# Patient Record
Sex: FEMALE | Race: BLACK OR AFRICAN AMERICAN | NOT HISPANIC OR LATINO
[De-identification: names, ages, dates, MRNs, and addresses within clinical notes are randomized per-mention and may not be internally consistent; named-entity substitution may affect disease eponyms.]

---

## 2017-09-25 ENCOUNTER — OTHER (OUTPATIENT)
Age: 72
End: 2017-09-25

## 2017-10-06 ENCOUNTER — APPOINTMENT (OUTPATIENT)
Dept: ORTHOPEDIC SURGERY | Facility: CLINIC | Age: 72
End: 2017-10-06
Payer: MEDICARE

## 2017-10-06 VITALS — HEIGHT: 62 IN

## 2017-10-06 DIAGNOSIS — Z87.39 PERSONAL HISTORY OF OTHER DISEASES OF THE MUSCULOSKELETAL SYSTEM AND CONNECTIVE TISSUE: ICD-10-CM

## 2017-10-06 DIAGNOSIS — Z78.9 OTHER SPECIFIED HEALTH STATUS: ICD-10-CM

## 2017-10-06 DIAGNOSIS — M76.891 OTHER SPECIFIED ENTHESOPATHIES OF RIGHT LOWER LIMB, EXCLUDING FOOT: ICD-10-CM

## 2017-10-06 DIAGNOSIS — Z86.39 PERSONAL HISTORY OF OTHER ENDOCRINE, NUTRITIONAL AND METABOLIC DISEASE: ICD-10-CM

## 2017-10-06 DIAGNOSIS — Z82.61 FAMILY HISTORY OF ARTHRITIS: ICD-10-CM

## 2017-10-06 DIAGNOSIS — Z80.3 FAMILY HISTORY OF MALIGNANT NEOPLASM OF BREAST: ICD-10-CM

## 2017-10-06 PROCEDURE — 99213 OFFICE O/P EST LOW 20 MIN: CPT

## 2017-10-06 PROCEDURE — 73560 X-RAY EXAM OF KNEE 1 OR 2: CPT | Mod: LT

## 2017-10-06 PROCEDURE — 73562 X-RAY EXAM OF KNEE 3: CPT | Mod: RT

## 2017-10-09 ENCOUNTER — TRANSCRIPTION ENCOUNTER (OUTPATIENT)
Age: 72
End: 2017-10-09

## 2018-01-12 ENCOUNTER — APPOINTMENT (OUTPATIENT)
Dept: ORTHOPEDIC SURGERY | Facility: CLINIC | Age: 73
End: 2018-01-12
Payer: MEDICARE

## 2018-01-12 PROCEDURE — 99213 OFFICE O/P EST LOW 20 MIN: CPT

## 2018-01-12 PROCEDURE — 73562 X-RAY EXAM OF KNEE 3: CPT | Mod: 50

## 2018-01-31 ENCOUNTER — RESULT REVIEW (OUTPATIENT)
Age: 73
End: 2018-01-31

## 2018-07-27 ENCOUNTER — RECORD ABSTRACTING (OUTPATIENT)
Age: 73
End: 2018-07-27

## 2018-08-16 ENCOUNTER — APPOINTMENT (OUTPATIENT)
Dept: PULMONOLOGY | Facility: CLINIC | Age: 73
End: 2018-08-16
Payer: MEDICARE

## 2018-08-16 VITALS
BODY MASS INDEX: 50.61 KG/M2 | DIASTOLIC BLOOD PRESSURE: 73 MMHG | HEIGHT: 62 IN | SYSTOLIC BLOOD PRESSURE: 111 MMHG | RESPIRATION RATE: 16 BRPM | HEART RATE: 106 BPM | OXYGEN SATURATION: 92 % | WEIGHT: 275 LBS

## 2018-08-16 PROCEDURE — 99213 OFFICE O/P EST LOW 20 MIN: CPT

## 2018-08-16 RX ORDER — ATORVASTATIN CALCIUM 20 MG/1
20 TABLET, FILM COATED ORAL
Qty: 90 | Refills: 0 | Status: DISCONTINUED | COMMUNITY
Start: 2017-07-12 | End: 2018-08-16

## 2018-08-16 RX ORDER — NAPROXEN 500 MG/1
500 TABLET ORAL
Qty: 60 | Refills: 1 | Status: DISCONTINUED | COMMUNITY
Start: 2017-10-06 | End: 2018-08-16

## 2018-08-16 RX ORDER — METHYLPREDNISOLONE 4 MG/1
4 TABLET ORAL
Qty: 21 | Refills: 0 | Status: DISCONTINUED | COMMUNITY
Start: 2017-10-27 | End: 2018-08-16

## 2018-08-16 RX ORDER — PRAVASTATIN SODIUM 10 MG/1
10 TABLET ORAL
Qty: 30 | Refills: 0 | Status: DISCONTINUED | COMMUNITY
Start: 2017-12-19 | End: 2018-08-16

## 2018-08-16 RX ORDER — AZITHROMYCIN 250 MG/1
250 TABLET, FILM COATED ORAL
Qty: 6 | Refills: 0 | Status: DISCONTINUED | COMMUNITY
Start: 2017-10-27 | End: 2018-08-16

## 2018-08-16 RX ORDER — LEVOFLOXACIN 500 MG/1
500 TABLET, FILM COATED ORAL
Qty: 10 | Refills: 0 | Status: DISCONTINUED | COMMUNITY
Start: 2017-08-25 | End: 2018-08-16

## 2018-08-16 RX ORDER — DILTIAZEM HYDROCHLORIDE 180 MG/1
180 TABLET, EXTENDED RELEASE ORAL
Refills: 0 | Status: DISCONTINUED | COMMUNITY
End: 2018-08-16

## 2018-08-16 RX ORDER — ROSUVASTATIN CALCIUM 5 MG/1
5 TABLET, FILM COATED ORAL
Qty: 90 | Refills: 0 | Status: DISCONTINUED | COMMUNITY
Start: 2017-12-18 | End: 2018-08-16

## 2019-02-22 ENCOUNTER — APPOINTMENT (OUTPATIENT)
Dept: ORTHOPEDIC SURGERY | Facility: CLINIC | Age: 74
End: 2019-02-22
Payer: MEDICARE

## 2019-02-22 VITALS — WEIGHT: 270 LBS | BODY MASS INDEX: 50.98 KG/M2 | HEIGHT: 61 IN

## 2019-02-22 DIAGNOSIS — M21.062 VALGUS DEFORMITY, NOT ELSEWHERE CLASSIFIED, LEFT KNEE: ICD-10-CM

## 2019-02-22 PROCEDURE — 73562 X-RAY EXAM OF KNEE 3: CPT | Mod: RT

## 2019-02-22 PROCEDURE — 99214 OFFICE O/P EST MOD 30 MIN: CPT

## 2019-02-22 PROCEDURE — 73564 X-RAY EXAM KNEE 4 OR MORE: CPT | Mod: LT

## 2019-02-22 NOTE — DISCUSSION/SUMMARY
[de-identified] : Discussed at length the nature of the patients condition. Her right TKR is doing well at 5 years.  Their left knee symptoms appear secondary to severe degenerative arthritis with valgus deformity and loose body in suprapatellar pouch. We discussed at length the natural history of LEFT knee degenerative arthritis and reviewed non-operative and operative treatment. Due to the pain, failure of prior nonoperative treatment including injections, NSAIDs, and physiotherapy, and associated disability I recommend a LEFT total knee replacement. The risks, benefits, convalescence and alternatives were reviewed. Numerous questions were asked and answered. We did discuss correction of the valgus deformity and possible peroneal nerve palsy with numbness and foot and ankle weakness, which tends to be temporary, if develops may need ankle brace for walking. Models were used as an educational tool. Surgery will be scheduled at a convenient time. Preop medical and cardiac clearance. \par  \par

## 2019-02-22 NOTE — ADDENDUM
[FreeTextEntry1] : This note was written by Navya Jerry on 02/22/2019 acting as scribe for Dr. Joselo Ramírez M.D.\par \par I, Dr. Joselo Ramírez M.D., have read and attest that all the information, medical decision making and discharge instructions within are true and accurate.\par

## 2019-02-22 NOTE — PHYSICAL EXAM
[Antalgic] : antalgic [de-identified] : Left Knee\par Inspection: no effusion or erythema.\par Wounds: 15 degrees valgus alignment \par Alignment: normal.\par Palpation: medial and lateral tenderness on palpation.\par ROM: Active (in degrees) 0-100 with pain and crepitus \par Ligamentous laxity (neg): all ligaments appear stable, negative ant. drawer test, negative post. drawer test, stable to varus stress test, stable to valgus stress test, negative Lachman's test, negative pivot shift test,\par Meniscal Test: negative McMurrays, negative Reuben.\par Patellofemoral Alignment Test: Q angle-, normal.\par Muscle Test: good quad strength.\par Leg examination: calf is soft and non-tender.\par \par Right knee\par Inspection: no effusion or erythema.\par Wounds: none.\par Alignment: normal.\par Palpation: no specific tenderness on palpation.\par ROM: Active (in degrees) 0-130\par Ligamentous laxity (neg): all ligaments appear stable, negative ant. drawer test, negative post. drawer test, stable to varus stress test, stable to valgus stress test, negative Lachman's test, negative pivot shift test,\par Meniscal Test: negative McMurrays, negative Reuben.\par Patellofemoral Alignment Test: Q angle-, normal.\par Muscle Test: good quad strength.\par Leg examination: calf is soft and non-tender.\par   [de-identified] : Right  knee xrays, taken at the office today show:,  AP, lateral, merchant view demonstrates a total knee replacement in satisfactory position and alignment. No evidence of loosening. The patella sits in a central position \par \par Left  knee xrays, standing AP/Lateral, Merchant films, and 45 degree PA standing view taken at the office today show:, diffuse tricompartmental degenerative arthritis, diffuse joint space narrowing, especially lateral compartment, marginal osteophytes, sclerosis, patellofemoral joint space narrowing, peripheral osteophytes, bone on bone, Kellgren and Tay grade 4, valgus deformity, large loose body in suprapatellar pouch \par

## 2019-02-22 NOTE — HISTORY OF PRESENT ILLNESS
[de-identified] : 72 y/o female presents for follow up evaluation 7 years s/p right TKR and for her left arthritic knee. She states her right knee is doing well, but she finds her left knee to interfere with her quality of life and bother her on a daily basis. She has not received any injections in her left knee, and takes Excedrin and glucosamine chondroitin with some improvement. She tries to stay active, but is held back by her left knee pain. Today, she would like to discuss her treatment options with Dr. Ramírez.

## 2019-02-27 ENCOUNTER — RESULT REVIEW (OUTPATIENT)
Age: 74
End: 2019-02-27

## 2019-04-03 ENCOUNTER — APPOINTMENT (OUTPATIENT)
Dept: PULMONOLOGY | Facility: CLINIC | Age: 74
End: 2019-04-03
Payer: MEDICARE

## 2019-04-03 VITALS
TEMPERATURE: 98.2 F | HEART RATE: 98 BPM | DIASTOLIC BLOOD PRESSURE: 75 MMHG | SYSTOLIC BLOOD PRESSURE: 123 MMHG | RESPIRATION RATE: 16 BRPM | OXYGEN SATURATION: 98 %

## 2019-04-03 PROCEDURE — 99213 OFFICE O/P EST LOW 20 MIN: CPT

## 2019-04-03 NOTE — HISTORY OF PRESENT ILLNESS
[FreeTextEntry1] : pt with mor compliant on cpap here for pulm clearance prior to L TKR 4/18/19\par no respiratory complaints\par using cpap without issue\par ESS 5\par AHI 3.5 per compliance report today.

## 2019-04-03 NOTE — REASON FOR VISIT
[Follow-Up] : a follow-up visit [FreeTextEntry2] : pulmonary clearance prior to knee surgery L TKR 4/18/19

## 2019-04-03 NOTE — PHYSICAL EXAM
[Well Groomed] : well groomed [General Appearance - In No Acute Distress] : no acute distress [] : no respiratory distress [Auscultation Breath Sounds / Voice Sounds] : lungs were clear to auscultation bilaterally

## 2019-04-03 NOTE — ASSESSMENT
[FreeTextEntry1] : cont to use CPAP thearpy, compliant and benefitting\par no pulmonary contraindication to proposed L TKR\par Standard mor precautions should be taken, pt instructed to bring her CPAP to the hospital for perioperative use.

## 2019-04-05 ENCOUNTER — OUTPATIENT (OUTPATIENT)
Dept: OUTPATIENT SERVICES | Facility: HOSPITAL | Age: 74
LOS: 1 days | Discharge: HOME | End: 2019-04-05

## 2019-04-05 VITALS
HEART RATE: 91 BPM | DIASTOLIC BLOOD PRESSURE: 85 MMHG | SYSTOLIC BLOOD PRESSURE: 137 MMHG | HEIGHT: 63 IN | OXYGEN SATURATION: 99 % | WEIGHT: 266.32 LBS | RESPIRATION RATE: 16 BRPM | TEMPERATURE: 98 F

## 2019-04-05 DIAGNOSIS — Z90.721 ACQUIRED ABSENCE OF OVARIES, UNILATERAL: Chronic | ICD-10-CM

## 2019-04-05 DIAGNOSIS — M17.12 UNILATERAL PRIMARY OSTEOARTHRITIS, LEFT KNEE: ICD-10-CM

## 2019-04-05 DIAGNOSIS — Z98.891 HISTORY OF UTERINE SCAR FROM PREVIOUS SURGERY: Chronic | ICD-10-CM

## 2019-04-05 DIAGNOSIS — E78.5 HYPERLIPIDEMIA, UNSPECIFIED: ICD-10-CM

## 2019-04-05 DIAGNOSIS — Z98.890 OTHER SPECIFIED POSTPROCEDURAL STATES: Chronic | ICD-10-CM

## 2019-04-05 DIAGNOSIS — M19.90 UNSPECIFIED OSTEOARTHRITIS, UNSPECIFIED SITE: ICD-10-CM

## 2019-04-05 DIAGNOSIS — M21.062 VALGUS DEFORMITY, NOT ELSEWHERE CLASSIFIED, LEFT KNEE: ICD-10-CM

## 2019-04-05 DIAGNOSIS — E11.9 TYPE 2 DIABETES MELLITUS WITHOUT COMPLICATIONS: ICD-10-CM

## 2019-04-05 DIAGNOSIS — Z01.818 ENCOUNTER FOR OTHER PREPROCEDURAL EXAMINATION: ICD-10-CM

## 2019-04-05 DIAGNOSIS — Z96.659 PRESENCE OF UNSPECIFIED ARTIFICIAL KNEE JOINT: Chronic | ICD-10-CM

## 2019-04-05 LAB
APTT BLD: 33.2 SEC — SIGNIFICANT CHANGE UP (ref 27.5–36.3)
BASOPHILS # BLD AUTO: 0.05 K/UL — SIGNIFICANT CHANGE UP (ref 0–0.2)
BASOPHILS NFR BLD AUTO: 0.8 % — SIGNIFICANT CHANGE UP (ref 0–2)
EOSINOPHIL # BLD AUTO: 0.17 K/UL — SIGNIFICANT CHANGE UP (ref 0–0.5)
EOSINOPHIL NFR BLD AUTO: 2.6 % — SIGNIFICANT CHANGE UP (ref 0–6)
HCT VFR BLD CALC: 39.8 % — SIGNIFICANT CHANGE UP (ref 34.5–45)
HGB BLD-MCNC: 11.8 G/DL — SIGNIFICANT CHANGE UP (ref 11.5–15.5)
IMM GRANULOCYTES NFR BLD AUTO: 1.1 % — SIGNIFICANT CHANGE UP (ref 0–1.5)
INR BLD: 1.02 RATIO — SIGNIFICANT CHANGE UP (ref 0.88–1.16)
LYMPHOCYTES # BLD AUTO: 1.68 K/UL — SIGNIFICANT CHANGE UP (ref 1–3.3)
LYMPHOCYTES # BLD AUTO: 25.5 % — SIGNIFICANT CHANGE UP (ref 13–44)
MCHC RBC-ENTMCNC: 24.2 PG — LOW (ref 27–34)
MCHC RBC-ENTMCNC: 29.6 GM/DL — LOW (ref 32–36)
MCV RBC AUTO: 81.7 FL — SIGNIFICANT CHANGE UP (ref 80–100)
MONOCYTES # BLD AUTO: 0.53 K/UL — SIGNIFICANT CHANGE UP (ref 0–0.9)
MONOCYTES NFR BLD AUTO: 8 % — SIGNIFICANT CHANGE UP (ref 2–14)
NEUTROPHILS # BLD AUTO: 4.1 K/UL — SIGNIFICANT CHANGE UP (ref 1.8–7.4)
NEUTROPHILS NFR BLD AUTO: 62 % — SIGNIFICANT CHANGE UP (ref 43–77)
NRBC # BLD: 0 /100 WBCS — SIGNIFICANT CHANGE UP (ref 0–0)
PLATELET # BLD AUTO: 305 K/UL — SIGNIFICANT CHANGE UP (ref 150–400)
PROTHROM AB SERPL-ACNC: 11.4 SEC — SIGNIFICANT CHANGE UP (ref 10–12.9)
RBC # BLD: 4.87 M/UL — SIGNIFICANT CHANGE UP (ref 3.8–5.2)
RBC # FLD: 18.6 % — HIGH (ref 10.3–14.5)
WBC # BLD: 6.6 K/UL — SIGNIFICANT CHANGE UP (ref 3.8–10.5)
WBC # FLD AUTO: 6.6 K/UL — SIGNIFICANT CHANGE UP (ref 3.8–10.5)

## 2019-04-05 NOTE — H&P PST ADULT - HISTORY OF PRESENT ILLNESS
74 yo female c/o left knee pain secondary to osteoarthritis - scheduled for left knee arthroplasty 72 yo female c/o left knee pain secondary to osteoarthritis - scheduled for left knee arthroplasty. PMH- htn, dm, hld,gerd

## 2019-04-05 NOTE — PHYSICAL THERAPY INITIAL EVALUATION ADULT - RANGE OF MOTION EXAMINATION, REHAB EVAL
except L knee limited due to pain./bilateral lower extremity was ROM was WNL (within normal limits)/deficits as listed below/bilateral upper extremity ROM was WNL (within normal limits)

## 2019-04-05 NOTE — OCCUPATIONAL THERAPY INITIAL EVALUATION ADULT - RANGE OF MOTION EXAMINATION, LOWER EXTREMITY
bilateral LE Active ROM was WFL  (within functional limits)/bilateral LE Passive ROM was WFL  (within functional limits)/ROM in flexion/extension is limited in both knees

## 2019-04-05 NOTE — H&P PST ADULT - NSICDXPASTSURGICALHX_GEN_ALL_CORE_FT
PAST SURGICAL HISTORY:  History of endometrial ablation 2002    History of unilateral oophorectomy     S/P  section     S/P knee replacement Right (  )

## 2019-04-05 NOTE — PHYSICAL THERAPY INITIAL EVALUATION ADULT - ACTIVE RANGE OF MOTION EXAMINATION, REHAB EVAL
except L knee limited due to pain./allie. upper extremity Active ROM was WNL (within normal limits)/bilateral lower extremity Active ROM was WNL (within normal limits)/deficits as listed below

## 2019-04-05 NOTE — OCCUPATIONAL THERAPY INITIAL EVALUATION ADULT - COORDINATION ASSESSED, REHAB EVAL
intact in BUE; diminished in BLE/finger to nose/heel to shin intact in BUE; diminished in BLE/heel to shin/finger to nose

## 2019-04-05 NOTE — PHYSICAL THERAPY INITIAL EVALUATION ADULT - PERTINENT HX OF CURRENT PROBLEM, REHAB EVAL
Patient attends pre-op testing today following consult c Dr. Ramírez due to chronic pain to L knee. Elective L TKA is now scheduled in this facility for 4/18/2019.

## 2019-04-05 NOTE — PHYSICAL THERAPY INITIAL EVALUATION ADULT - GAIT DEVIATIONS NOTED, PT EVAL
increased time in double stance/decreased weight-shifting ability/decreased chery/decreased step length/decreased stride length/increased stride width

## 2019-04-05 NOTE — OCCUPATIONAL THERAPY INITIAL EVALUATION ADULT - LIVES WITH, PROFILE
spouse/in a private house with 4 entry , equipped with wide hand rails. Once inside, pt has to negotiate a flight of stairs to access the bedroom and bathroom. The bathroom has a stall shower, grab bars , fixed shower head and standard toilet grab ars

## 2019-04-05 NOTE — OCCUPATIONAL THERAPY INITIAL EVALUATION ADULT - ADDITIONAL COMMENTS
Pt is functioning in her roles, self sufficient, driving & ambulating independently in the community without  a single axis cane. Pt owns no other DME.  Pt is right hand dominant and wears glasses for reading. Pt c/o  daily  pain in her left knee with intensity 9/10 . This impacts ADL management, standing tolerance and ambulation ;pt takes Tylenol and Excedrin PRN for pain relief. Pt scores 80% of patient specific scale.

## 2019-04-05 NOTE — OCCUPATIONAL THERAPY INITIAL EVALUATION ADULT - RANGE OF MOTION EXAMINATION, UPPER EXTREMITY
Right UE Active ROM was WFL (within functional limits)/ROM in left shoulder 0-85 degrees/Right UE Passive ROM was WFL  (within functional limits)

## 2019-04-05 NOTE — PHYSICAL THERAPY INITIAL EVALUATION ADULT - ADDITIONAL COMMENTS
Pt lives with her  (family can come over and provide assist upon D/C home) in a private home, 4 entry steps (B/L rails, far apart to reach both at the same time), 1 flight of stairs c B/L rails (reachable). Pt states she is currently independent with all functional mobility including community ambulation with straight cane. Pt states she is independent with ADL's as well. Pt is right hand dominant, wears eye glasses, drives, & is retired. Pt has a  walk-in shower stall with a fixed shower head, comfort toilet seat height c removable frame rails, & no grab bar. Pt reports daily 8/10 pain & states it is worse with any activity. Pt endorses taking narcotics for pain management. Goal of therapy: manage pain & improve functional mobility.

## 2019-04-05 NOTE — H&P PST ADULT - NSICDXPASTMEDICALHX_GEN_ALL_CORE_FT
PAST MEDICAL HISTORY:  Chronic GERD     Diabetes mellitus     HLD (hyperlipidemia)     HTN (hypertension)     Sleep apnea

## 2019-04-05 NOTE — H&P PST ADULT - NSICDXPROBLEM_GEN_ALL_CORE_FT
PROBLEM DIAGNOSES  Problem: Chronic osteoarthritis  Assessment and Plan: scheduled for left knee arthroplasty    Problem: Diabetes mellitus  Assessment and Plan: continue meds      R/O PROBLEM DIAGNOSES  Problem: HTN (hypertension)  Assessment and Plan: continue meds

## 2019-04-05 NOTE — OCCUPATIONAL THERAPY INITIAL EVALUATION ADULT - PERTINENT HX OF CURRENT PROBLEM, REHAB EVAL
Pt is slated for elective surgery for left TKR at a later date with MD Ramírez due to OA, chronic pain and DJD. P t reported no fall in the past 6 months

## 2019-04-05 NOTE — H&P PST ADULT - ASSESSMENT
osteoarthritis left knee  CAPRINI SCORE    AGE RELATED RISK FACTORS                                                       MOBILITY RELATED FACTORS  [ ] Age 41-60 years                                            (1 Point)                  [ ] Bed rest                                                        (1 Point)  [ ] Age: 61-74 years                                           (2 Points)                [ ] Plaster cast                                                   (2 Points)  [ x] Age= 75 years                                              (3 Points)                 [ ] Bed bound for more than 72 hours                   (2 Points)    DISEASE RELATED RISK FACTORS                                               GENDER SPECIFIC FACTORS  [x ] Edema in the lower extremities                       (1 Point)                  [ ] Pregnancy                                                     (1 Point)  [ ] Varicose veins                                               (1 Point)                  [ ] Post-partum < 6 weeks                                   (1 Point)             [ x] BMI > 25 Kg/m2                                            (1 Point)                  [ ] Hormonal therapy  or oral contraception            (1 Point)                 [ ] Sepsis (in the previous month)                        (1 Point)                  [ ] History of pregnancy complications  [ ] Pneumonia or serious lung disease                                               [ ] Unexplained or recurrent                       (1 Point)           (in the previous month)                               (1 Point)  [ ] Abnormal pulmonary function test                     (1 Point)                 SURGERY RELATED RISK FACTORS  [ ] Acute myocardial infarction                              (1 Point)                 [ ]  Section                                            (1 Point)  [ ] Congestive heart failure (in the previous month)  (1 Point)                 [ ] Minor surgery                                                 (1 Point)   [ ] Inflammatory bowel disease                             (1 Point)                 [ ] Arthroscopic surgery                                        (2 Points)  [ ] Central venous access                                    (2 Points)                [ ] General surgery lasting more than 45 minutes   (2 Points)       [ ] Stroke (in the previous month)                          (5 Points)               [x ] Elective arthroplasty                                        (5 Points)                                                                                                                                               HEMATOLOGY RELATED FACTORS                                                 TRAUMA RELATED RISK FACTORS  [ ] Prior episodes of VTE                                     (3 Points)                 [ ] Fracture of the hip, pelvis, or leg                       (5 Points)  [ ] Positive family history for VTE                         (3 Points)                 [ ] Acute spinal cord injury (in the previous month)  (5 Points)  [ ] Prothrombin 41964 A                                      (3 Points)                 [ ] Paralysis  (less than 1 month)                          (5 Points)  [ ] Factor V Leiden                                             (3 Points)                 [ ] Multiple Trauma within 1 month                         (5 Points)  [ ] Lupus anticoagulants                                     (3 Points)                                                           [ ] Anticardiolipin antibodies                                (3 Points)                                                       [ ] High homocysteine in the blood                      (3 Points)                                             [ ] Other congenital or acquired thrombophilia       (3 Points)                                                [ ] Heparin induced thrombocytopenia                  (3 Points)                                          Total Score [      10    ]

## 2019-04-06 LAB
MRSA PCR RESULT.: SIGNIFICANT CHANGE UP
S AUREUS DNA NOSE QL NAA+PROBE: SIGNIFICANT CHANGE UP

## 2019-04-18 ENCOUNTER — INPATIENT (INPATIENT)
Facility: HOSPITAL | Age: 74
LOS: 1 days | Discharge: HOME | End: 2019-04-20
Attending: ORTHOPAEDIC SURGERY | Admitting: ORTHOPAEDIC SURGERY
Payer: MEDICARE

## 2019-04-18 ENCOUNTER — RESULT REVIEW (OUTPATIENT)
Age: 74
End: 2019-04-18

## 2019-04-18 ENCOUNTER — APPOINTMENT (OUTPATIENT)
Dept: ORTHOPEDIC SURGERY | Facility: HOSPITAL | Age: 74
End: 2019-04-18

## 2019-04-18 ENCOUNTER — CHART COPY (OUTPATIENT)
Age: 74
End: 2019-04-18

## 2019-04-18 ENCOUNTER — TRANSCRIPTION ENCOUNTER (OUTPATIENT)
Age: 74
End: 2019-04-18

## 2019-04-18 VITALS
TEMPERATURE: 97 F | DIASTOLIC BLOOD PRESSURE: 78 MMHG | SYSTOLIC BLOOD PRESSURE: 142 MMHG | HEIGHT: 60.98 IN | HEART RATE: 76 BPM | OXYGEN SATURATION: 98 % | RESPIRATION RATE: 16 BRPM

## 2019-04-18 DIAGNOSIS — M17.12 UNILATERAL PRIMARY OSTEOARTHRITIS, LEFT KNEE: ICD-10-CM

## 2019-04-18 DIAGNOSIS — Z98.891 HISTORY OF UTERINE SCAR FROM PREVIOUS SURGERY: Chronic | ICD-10-CM

## 2019-04-18 DIAGNOSIS — Z96.659 PRESENCE OF UNSPECIFIED ARTIFICIAL KNEE JOINT: Chronic | ICD-10-CM

## 2019-04-18 DIAGNOSIS — Z90.721 ACQUIRED ABSENCE OF OVARIES, UNILATERAL: Chronic | ICD-10-CM

## 2019-04-18 DIAGNOSIS — Z98.890 OTHER SPECIFIED POSTPROCEDURAL STATES: Chronic | ICD-10-CM

## 2019-04-18 LAB
ANION GAP SERPL CALC-SCNC: 8 MMOL/L — SIGNIFICANT CHANGE UP (ref 5–17)
BUN SERPL-MCNC: 37 MG/DL — HIGH (ref 7–23)
CALCIUM SERPL-MCNC: 9.5 MG/DL — SIGNIFICANT CHANGE UP (ref 8.5–10.1)
CHLORIDE SERPL-SCNC: 110 MMOL/L — HIGH (ref 96–108)
CO2 SERPL-SCNC: 21 MMOL/L — LOW (ref 22–31)
CREAT SERPL-MCNC: 1.56 MG/DL — HIGH (ref 0.5–1.3)
GLUCOSE BLDC GLUCOMTR-MCNC: 108 MG/DL — HIGH (ref 70–99)
GLUCOSE BLDC GLUCOMTR-MCNC: 129 MG/DL — HIGH (ref 70–99)
GLUCOSE BLDC GLUCOMTR-MCNC: 130 MG/DL — HIGH (ref 70–99)
GLUCOSE SERPL-MCNC: 143 MG/DL — HIGH (ref 70–99)
HCT VFR BLD CALC: 38.8 % — SIGNIFICANT CHANGE UP (ref 34.5–45)
HGB BLD-MCNC: 11.5 G/DL — SIGNIFICANT CHANGE UP (ref 11.5–15.5)
INR BLD: 1.11 RATIO — SIGNIFICANT CHANGE UP (ref 0.88–1.16)
MCHC RBC-ENTMCNC: 25.4 PG — LOW (ref 27–34)
MCHC RBC-ENTMCNC: 29.6 GM/DL — LOW (ref 32–36)
MCV RBC AUTO: 85.8 FL — SIGNIFICANT CHANGE UP (ref 80–100)
NRBC # BLD: 0 /100 WBCS — SIGNIFICANT CHANGE UP (ref 0–0)
PLATELET # BLD AUTO: 303 K/UL — SIGNIFICANT CHANGE UP (ref 150–400)
POTASSIUM SERPL-MCNC: 4.1 MMOL/L — SIGNIFICANT CHANGE UP (ref 3.5–5.3)
POTASSIUM SERPL-SCNC: 4.1 MMOL/L — SIGNIFICANT CHANGE UP (ref 3.5–5.3)
PROTHROM AB SERPL-ACNC: 12.5 SEC — SIGNIFICANT CHANGE UP (ref 10–12.9)
RBC # BLD: 4.52 M/UL — SIGNIFICANT CHANGE UP (ref 3.8–5.2)
RBC # FLD: 21.1 % — HIGH (ref 10.3–14.5)
SODIUM SERPL-SCNC: 139 MMOL/L — SIGNIFICANT CHANGE UP (ref 135–145)
WBC # BLD: 6.4 K/UL — SIGNIFICANT CHANGE UP (ref 3.8–10.5)
WBC # FLD AUTO: 6.4 K/UL — SIGNIFICANT CHANGE UP (ref 3.8–10.5)

## 2019-04-18 PROCEDURE — 88311 DECALCIFY TISSUE: CPT | Mod: 26

## 2019-04-18 PROCEDURE — 88305 TISSUE EXAM BY PATHOLOGIST: CPT | Mod: 26

## 2019-04-18 PROCEDURE — 27447 TOTAL KNEE ARTHROPLASTY: CPT | Mod: LT

## 2019-04-18 RX ORDER — SODIUM CHLORIDE 9 MG/ML
1000 INJECTION, SOLUTION INTRAVENOUS
Qty: 0 | Refills: 0 | Status: DISCONTINUED | OUTPATIENT
Start: 2019-04-18 | End: 2019-04-18

## 2019-04-18 RX ORDER — METOCLOPRAMIDE HCL 10 MG
10 TABLET ORAL ONCE
Qty: 0 | Refills: 0 | Status: DISCONTINUED | OUTPATIENT
Start: 2019-04-18 | End: 2019-04-18

## 2019-04-18 RX ORDER — ONDANSETRON 8 MG/1
4 TABLET, FILM COATED ORAL EVERY 6 HOURS
Qty: 0 | Refills: 0 | Status: DISCONTINUED | OUTPATIENT
Start: 2019-04-18 | End: 2019-04-20

## 2019-04-18 RX ORDER — MAGNESIUM HYDROXIDE 400 MG/1
30 TABLET, CHEWABLE ORAL DAILY
Qty: 0 | Refills: 0 | Status: DISCONTINUED | OUTPATIENT
Start: 2019-04-18 | End: 2019-04-20

## 2019-04-18 RX ORDER — CELECOXIB 200 MG/1
200 CAPSULE ORAL ONCE
Qty: 0 | Refills: 0 | Status: DISCONTINUED | OUTPATIENT
Start: 2019-04-18 | End: 2019-04-18

## 2019-04-18 RX ORDER — VANCOMYCIN HCL 1 G
1500 VIAL (EA) INTRAVENOUS EVERY 12 HOURS
Qty: 0 | Refills: 0 | Status: COMPLETED | OUTPATIENT
Start: 2019-04-18 | End: 2019-04-18

## 2019-04-18 RX ORDER — ACETAMINOPHEN 500 MG
975 TABLET ORAL EVERY 8 HOURS
Qty: 0 | Refills: 0 | Status: COMPLETED | OUTPATIENT
Start: 2019-04-18 | End: 2019-04-20

## 2019-04-18 RX ORDER — DEXTROSE 50 % IN WATER 50 %
25 SYRINGE (ML) INTRAVENOUS ONCE
Qty: 0 | Refills: 0 | Status: DISCONTINUED | OUTPATIENT
Start: 2019-04-18 | End: 2019-04-20

## 2019-04-18 RX ORDER — HYDROMORPHONE HYDROCHLORIDE 2 MG/ML
1 INJECTION INTRAMUSCULAR; INTRAVENOUS; SUBCUTANEOUS
Qty: 0 | Refills: 0 | Status: DISCONTINUED | OUTPATIENT
Start: 2019-04-18 | End: 2019-04-18

## 2019-04-18 RX ORDER — DOCUSATE SODIUM 100 MG
100 CAPSULE ORAL THREE TIMES A DAY
Qty: 0 | Refills: 0 | Status: DISCONTINUED | OUTPATIENT
Start: 2019-04-18 | End: 2019-04-20

## 2019-04-18 RX ORDER — SODIUM CHLORIDE 9 MG/ML
1000 INJECTION, SOLUTION INTRAVENOUS
Qty: 0 | Refills: 0 | Status: DISCONTINUED | OUTPATIENT
Start: 2019-04-18 | End: 2019-04-20

## 2019-04-18 RX ORDER — LOSARTAN POTASSIUM 100 MG/1
100 TABLET, FILM COATED ORAL DAILY
Qty: 0 | Refills: 0 | Status: DISCONTINUED | OUTPATIENT
Start: 2019-04-18 | End: 2019-04-20

## 2019-04-18 RX ORDER — ENOXAPARIN SODIUM 100 MG/ML
40 INJECTION SUBCUTANEOUS EVERY 24 HOURS
Qty: 0 | Refills: 0 | Status: DISCONTINUED | OUTPATIENT
Start: 2019-04-19 | End: 2019-04-20

## 2019-04-18 RX ORDER — DEXTROSE 50 % IN WATER 50 %
12.5 SYRINGE (ML) INTRAVENOUS ONCE
Qty: 0 | Refills: 0 | Status: DISCONTINUED | OUTPATIENT
Start: 2019-04-18 | End: 2019-04-20

## 2019-04-18 RX ORDER — KETOROLAC TROMETHAMINE 30 MG/ML
30 SYRINGE (ML) INJECTION ONCE
Qty: 0 | Refills: 0 | Status: DISCONTINUED | OUTPATIENT
Start: 2019-04-19 | End: 2019-04-19

## 2019-04-18 RX ORDER — BENZOCAINE AND MENTHOL 5; 1 G/100ML; G/100ML
1 LIQUID ORAL EVERY 6 HOURS
Qty: 0 | Refills: 0 | Status: DISCONTINUED | OUTPATIENT
Start: 2019-04-18 | End: 2019-04-20

## 2019-04-18 RX ORDER — ACETAMINOPHEN 500 MG
650 TABLET ORAL ONCE
Qty: 0 | Refills: 0 | Status: DISCONTINUED | OUTPATIENT
Start: 2019-04-18 | End: 2019-04-18

## 2019-04-18 RX ORDER — HYDROMORPHONE HYDROCHLORIDE 2 MG/ML
0.5 INJECTION INTRAMUSCULAR; INTRAVENOUS; SUBCUTANEOUS
Qty: 0 | Refills: 0 | Status: DISCONTINUED | OUTPATIENT
Start: 2019-04-18 | End: 2019-04-18

## 2019-04-18 RX ORDER — TRAMADOL HYDROCHLORIDE 50 MG/1
100 TABLET ORAL EVERY 4 HOURS
Qty: 0 | Refills: 0 | Status: DISCONTINUED | OUTPATIENT
Start: 2019-04-18 | End: 2019-04-20

## 2019-04-18 RX ORDER — ENOXAPARIN SODIUM 100 MG/ML
40 INJECTION SUBCUTANEOUS
Qty: 5.2 | Refills: 0
Start: 2019-04-18 | End: 2019-04-30

## 2019-04-18 RX ORDER — DILTIAZEM HCL 120 MG
180 CAPSULE, EXT RELEASE 24 HR ORAL DAILY
Qty: 0 | Refills: 0 | Status: DISCONTINUED | OUTPATIENT
Start: 2019-04-18 | End: 2019-04-20

## 2019-04-18 RX ORDER — ACETAMINOPHEN 500 MG
650 TABLET ORAL EVERY 6 HOURS
Qty: 0 | Refills: 0 | Status: DISCONTINUED | OUTPATIENT
Start: 2019-04-18 | End: 2019-04-20

## 2019-04-18 RX ORDER — INSULIN LISPRO 100/ML
VIAL (ML) SUBCUTANEOUS
Qty: 0 | Refills: 0 | Status: DISCONTINUED | OUTPATIENT
Start: 2019-04-18 | End: 2019-04-20

## 2019-04-18 RX ORDER — TRAMADOL HYDROCHLORIDE 50 MG/1
50 TABLET ORAL EVERY 4 HOURS
Qty: 0 | Refills: 0 | Status: DISCONTINUED | OUTPATIENT
Start: 2019-04-18 | End: 2019-04-20

## 2019-04-18 RX ORDER — COLCHICINE 0.6 MG
1 TABLET ORAL
Qty: 0 | Refills: 0 | COMMUNITY

## 2019-04-18 RX ORDER — DEXTROSE 50 % IN WATER 50 %
15 SYRINGE (ML) INTRAVENOUS ONCE
Qty: 0 | Refills: 0 | Status: DISCONTINUED | OUTPATIENT
Start: 2019-04-18 | End: 2019-04-20

## 2019-04-18 RX ORDER — POTASSIUM CHLORIDE 20 MEQ
20 PACKET (EA) ORAL DAILY
Qty: 0 | Refills: 0 | Status: DISCONTINUED | OUTPATIENT
Start: 2019-04-18 | End: 2019-04-20

## 2019-04-18 RX ORDER — SPIRONOLACTONE 25 MG/1
25 TABLET, FILM COATED ORAL DAILY
Qty: 0 | Refills: 0 | Status: DISCONTINUED | OUTPATIENT
Start: 2019-04-18 | End: 2019-04-20

## 2019-04-18 RX ORDER — DIPHENHYDRAMINE HCL 50 MG
25 CAPSULE ORAL EVERY 4 HOURS
Qty: 0 | Refills: 0 | Status: DISCONTINUED | OUTPATIENT
Start: 2019-04-18 | End: 2019-04-20

## 2019-04-18 RX ORDER — OXYCODONE HYDROCHLORIDE 5 MG/1
1 TABLET ORAL
Qty: 28 | Refills: 0 | OUTPATIENT
Start: 2019-04-18 | End: 2019-04-24

## 2019-04-18 RX ORDER — POLYETHYLENE GLYCOL 3350 17 G/17G
17 POWDER, FOR SOLUTION ORAL DAILY
Qty: 0 | Refills: 0 | Status: DISCONTINUED | OUTPATIENT
Start: 2019-04-18 | End: 2019-04-20

## 2019-04-18 RX ORDER — ACETAMINOPHEN 500 MG
1000 TABLET ORAL ONCE
Qty: 0 | Refills: 0 | Status: COMPLETED | OUTPATIENT
Start: 2019-04-18 | End: 2019-04-18

## 2019-04-18 RX ORDER — GLUCAGON INJECTION, SOLUTION 0.5 MG/.1ML
1 INJECTION, SOLUTION SUBCUTANEOUS ONCE
Qty: 0 | Refills: 0 | Status: DISCONTINUED | OUTPATIENT
Start: 2019-04-18 | End: 2019-04-20

## 2019-04-18 RX ORDER — ASPIRIN/CALCIUM CARB/MAGNESIUM 324 MG
1 TABLET ORAL
Qty: 60 | Refills: 0
Start: 2019-04-18 | End: 2019-05-17

## 2019-04-18 RX ORDER — SENNA PLUS 8.6 MG/1
2 TABLET ORAL AT BEDTIME
Qty: 0 | Refills: 0 | Status: DISCONTINUED | OUTPATIENT
Start: 2019-04-18 | End: 2019-04-20

## 2019-04-18 RX ORDER — PANTOPRAZOLE SODIUM 20 MG/1
40 TABLET, DELAYED RELEASE ORAL
Qty: 0 | Refills: 0 | Status: DISCONTINUED | OUTPATIENT
Start: 2019-04-18 | End: 2019-04-20

## 2019-04-18 RX ORDER — METOCLOPRAMIDE HCL 10 MG
10 TABLET ORAL EVERY 6 HOURS
Qty: 0 | Refills: 0 | Status: DISCONTINUED | OUTPATIENT
Start: 2019-04-18 | End: 2019-04-20

## 2019-04-18 RX ORDER — FUROSEMIDE 40 MG
40 TABLET ORAL DAILY
Qty: 0 | Refills: 0 | Status: DISCONTINUED | OUTPATIENT
Start: 2019-04-18 | End: 2019-04-20

## 2019-04-18 RX ORDER — ACETAMINOPHEN 500 MG
1000 TABLET ORAL ONCE
Qty: 0 | Refills: 0 | Status: DISCONTINUED | OUTPATIENT
Start: 2019-04-18 | End: 2019-04-18

## 2019-04-18 RX ADMIN — HYDROMORPHONE HYDROCHLORIDE 1 MILLIGRAM(S): 2 INJECTION INTRAMUSCULAR; INTRAVENOUS; SUBCUTANEOUS at 20:53

## 2019-04-18 RX ADMIN — Medication 25 MILLIGRAM(S): at 22:12

## 2019-04-18 RX ADMIN — Medication 300 MILLIGRAM(S): at 23:39

## 2019-04-18 RX ADMIN — Medication 400 MILLIGRAM(S): at 23:39

## 2019-04-18 RX ADMIN — TRAMADOL HYDROCHLORIDE 100 MILLIGRAM(S): 50 TABLET ORAL at 17:05

## 2019-04-18 RX ADMIN — TRAMADOL HYDROCHLORIDE 100 MILLIGRAM(S): 50 TABLET ORAL at 17:40

## 2019-04-18 RX ADMIN — SODIUM CHLORIDE 75 MILLILITER(S): 9 INJECTION, SOLUTION INTRAVENOUS at 14:46

## 2019-04-18 RX ADMIN — SODIUM CHLORIDE 125 MILLILITER(S): 9 INJECTION, SOLUTION INTRAVENOUS at 17:04

## 2019-04-18 RX ADMIN — ONDANSETRON 4 MILLIGRAM(S): 8 TABLET, FILM COATED ORAL at 23:18

## 2019-04-18 RX ADMIN — Medication 100 MILLIGRAM(S): at 22:13

## 2019-04-18 RX ADMIN — Medication 1000 MILLIGRAM(S): at 23:55

## 2019-04-18 RX ADMIN — HYDROMORPHONE HYDROCHLORIDE 1 MILLIGRAM(S): 2 INJECTION INTRAMUSCULAR; INTRAVENOUS; SUBCUTANEOUS at 20:38

## 2019-04-18 NOTE — PROGRESS NOTE ADULT - SUBJECTIVE AND OBJECTIVE BOX
Patient is seen and examined at bedside. Denies CP/SOB/Dizziness/N/V/D/HA. Pain is controlled.     Vital Signs Last 24 Hrs  T(C): 36.4 (18 Apr 2019 18:34), Max: 36.4 (18 Apr 2019 18:34)  T(F): 97.5 (18 Apr 2019 18:34), Max: 97.5 (18 Apr 2019 18:34)  HR: 75 (18 Apr 2019 18:34) (65 - 81)  BP: 128/89 (18 Apr 2019 18:34) (92/49 - 142/78)  BP(mean): --  RR: 17 (18 Apr 2019 18:34) (14 - 20)  SpO2: 97% (18 Apr 2019 18:34) (96% - 99%)    GEN: NAD  LLE: Dressing C/D/I.   Motor intact + EHL/FHL/TA/GS in the BL LE. Sensation is grossly intact distal . Extremity warm. Compartments are soft. DP 1+    Labs:                          11.5   6.40  )-----------( 303      ( 18 Apr 2019 14:36 )             38.8       04-18    139  |  110<H>  |  37<H>  ----------------------------<  143<H>  4.1   |  21<L>  |  1.56<H>    Ca    9.5      18 Apr 2019 14:36        A/P: Patient is a 73y y/o Female s/p L TKA, POD # 0    -Pain control/analgesia  -Inc spirometry  -Venodynes/foot pumps  -F/U AM Labs  -PT/OT/WBAT  -Antibiotic  -Anticoagulation

## 2019-04-18 NOTE — DISCHARGE NOTE PROVIDER - CARE PROVIDER_API CALL
Joselo Ramírez)  Orthopaedic Surgery  [address], [address]  [place] [address]  Phone: [phone]  Fax: [phone]  Follow Up Time:

## 2019-04-18 NOTE — DISCHARGE NOTE PROVIDER - NSDCHOSPICE_GEN_A_CORE
Ortho Telephone Triage Follow Up Call [phone]  Notified daughter, Lu, that  NATALY Doe RN/Devan Fontenot reports that pt is playing Bingo and assessed pt's RUE and some swelling is noted to fingers. Advised  that staff will promote pt elevating RUE on pillows to reduce swelling. Devan Fontenot staff will monitor pt  and will send pt to [facility] ED for any concerns over weekend. Daughter states understanding and has no further questions/concerns at this time.     
Ortho Telephone Triage Follow Up Call [phone]  Spoke with Ayah Doe RN/Devan Fontenot who reports that pt RUE is, presently, in sling and that pt has c/o cast to RUE being heavy. States pt is playing Bingo and is in NAD at this time.  Noted fingers are swollen. Pt is able to move fingers with no discoloration of nailbeds.  NATALY Doe RN will promote pt elevating/supporting RUE on pillows for reduction of swelling. Instructed per TONA Monk to monitor for continued/worsening  pain/swelling and/or question of compartmental syndrome and that pt will need to be sent to ED for cast change. States understanding. [facility] ED contact number provided. TONA Monk notified.     
No

## 2019-04-18 NOTE — DISCHARGE NOTE PROVIDER - HOSPITAL COURSE
The patient is a 73 year old F status post elective total knee Arthroplasty to the L knee after failing outpatient nonoperative conservative management. Patient presented to [facility] after being medically cleared for an elective surgical procedure. The patient was taken to the operating room on date mentioned above. Prophylactic antibiotics were started before the procedure and continued for 24 hours. There were no complications during the procedure and patient tolerated the procedure well. The patient was transferred to the recovery room in stable condition and subsequently to the surgical floor. The patient was placed on Lovenox for anticoagulation while in house to continue until POD 14, then starting POD 15 patient instructed to start ASA 325mg PO BID for a total of 30 days. All home medications were continued. The patient received physical therapy daily and daily labs were followed. The hemovac drain was DC'd on POD #1. The dressing was kept clean, dry, intact. The rest of the hospital stay was unremarkable.

## 2019-04-18 NOTE — PHYSICAL THERAPY INITIAL EVALUATION ADULT - DIAGNOSIS, PT EVAL
Decreased strength LLE, standing and ambulation balance, decreased general endurance, active Left knee flexion and extension.

## 2019-04-18 NOTE — PHYSICAL THERAPY INITIAL EVALUATION ADULT - LIVES WITH, PROFILE
lives with , pvt house, 3 steps to enter with 1 rail and 14 steps inside the house with rails on both sides., with

## 2019-04-18 NOTE — PHYSICAL THERAPY INITIAL EVALUATION ADULT - IMPAIRMENTS FOUND, PT EVAL
ergonomics and body mechanics/gait, locomotion, and balance/muscle strength/aerobic capacity/endurance/ROM/sensory integrity

## 2019-04-18 NOTE — DISCHARGE NOTE PROVIDER - NSDCCPCAREPLAN_GEN_ALL_CORE_FT
PRINCIPAL DISCHARGE DIAGNOSIS  Diagnosis: Status post total left knee replacement  Assessment and Plan of Treatment: 1.	Pain Control  2.	Walking with full weight bearing as tolerated, with assistive devices (walker/Cane as Needed)  3.	DVT Prophylaxis- lovenox x 2 weeks followed by aspirin x 4 weeks  4.	Physical Therapy  5.	Follow up with Dr. Ramírez as Outpatient in 10-14 Days after Discharge from the Hospital or Rehab. Call Office For Appointment.   6.	Physician will remove Staples Post-Op Day 21, and Remove Dressing Post-Op Day 10. Dry dressing changes daily if wound has persistent drainage.  7.	Ice affected area as Needed  8.	Keep Dressing Clean and dry.

## 2019-04-19 ENCOUNTER — TRANSCRIPTION ENCOUNTER (OUTPATIENT)
Age: 74
End: 2019-04-19

## 2019-04-19 ENCOUNTER — CHART COPY (OUTPATIENT)
Age: 74
End: 2019-04-19

## 2019-04-19 LAB
ANION GAP SERPL CALC-SCNC: 8 MMOL/L — SIGNIFICANT CHANGE UP (ref 5–17)
BUN SERPL-MCNC: 30 MG/DL — HIGH (ref 7–23)
CALCIUM SERPL-MCNC: 10.2 MG/DL — HIGH (ref 8.5–10.1)
CHLORIDE SERPL-SCNC: 107 MMOL/L — SIGNIFICANT CHANGE UP (ref 96–108)
CO2 SERPL-SCNC: 22 MMOL/L — SIGNIFICANT CHANGE UP (ref 22–31)
CREAT SERPL-MCNC: 1.29 MG/DL — SIGNIFICANT CHANGE UP (ref 0.5–1.3)
GLUCOSE BLDC GLUCOMTR-MCNC: 127 MG/DL — HIGH (ref 70–99)
GLUCOSE BLDC GLUCOMTR-MCNC: 136 MG/DL — HIGH (ref 70–99)
GLUCOSE BLDC GLUCOMTR-MCNC: 139 MG/DL — HIGH (ref 70–99)
GLUCOSE BLDC GLUCOMTR-MCNC: 152 MG/DL — HIGH (ref 70–99)
GLUCOSE SERPL-MCNC: 112 MG/DL — HIGH (ref 70–99)
HBA1C BLD-MCNC: 5.6 % — SIGNIFICANT CHANGE UP (ref 4–5.6)
HCT VFR BLD CALC: 38.8 % — SIGNIFICANT CHANGE UP (ref 34.5–45)
HGB BLD-MCNC: 11.4 G/DL — LOW (ref 11.5–15.5)
MCHC RBC-ENTMCNC: 25.1 PG — LOW (ref 27–34)
MCHC RBC-ENTMCNC: 29.4 GM/DL — LOW (ref 32–36)
MCV RBC AUTO: 85.3 FL — SIGNIFICANT CHANGE UP (ref 80–100)
NRBC # BLD: 0 /100 WBCS — SIGNIFICANT CHANGE UP (ref 0–0)
PLATELET # BLD AUTO: 281 K/UL — SIGNIFICANT CHANGE UP (ref 150–400)
POTASSIUM SERPL-MCNC: 4.6 MMOL/L — SIGNIFICANT CHANGE UP (ref 3.5–5.3)
POTASSIUM SERPL-SCNC: 4.6 MMOL/L — SIGNIFICANT CHANGE UP (ref 3.5–5.3)
RBC # BLD: 4.55 M/UL — SIGNIFICANT CHANGE UP (ref 3.8–5.2)
RBC # FLD: 20.5 % — HIGH (ref 10.3–14.5)
SODIUM SERPL-SCNC: 137 MMOL/L — SIGNIFICANT CHANGE UP (ref 135–145)
WBC # BLD: 13.11 K/UL — HIGH (ref 3.8–10.5)
WBC # FLD AUTO: 13.11 K/UL — HIGH (ref 3.8–10.5)

## 2019-04-19 PROCEDURE — 73560 X-RAY EXAM OF KNEE 1 OR 2: CPT | Mod: 26,LT

## 2019-04-19 RX ORDER — ACETAMINOPHEN 500 MG
1000 TABLET ORAL ONCE
Qty: 0 | Refills: 0 | Status: COMPLETED | OUTPATIENT
Start: 2019-04-19 | End: 2019-04-19

## 2019-04-19 RX ORDER — HYDROMORPHONE HYDROCHLORIDE 2 MG/ML
0.5 INJECTION INTRAMUSCULAR; INTRAVENOUS; SUBCUTANEOUS EVERY 4 HOURS
Qty: 0 | Refills: 0 | Status: DISCONTINUED | OUTPATIENT
Start: 2019-04-19 | End: 2019-04-20

## 2019-04-19 RX ADMIN — Medication 1 TABLET(S): at 11:43

## 2019-04-19 RX ADMIN — Medication 975 MILLIGRAM(S): at 22:45

## 2019-04-19 RX ADMIN — Medication 1000 MILLIGRAM(S): at 16:00

## 2019-04-19 RX ADMIN — HYDROMORPHONE HYDROCHLORIDE 0.5 MILLIGRAM(S): 2 INJECTION INTRAMUSCULAR; INTRAVENOUS; SUBCUTANEOUS at 20:50

## 2019-04-19 RX ADMIN — Medication 1000 MILLIGRAM(S): at 07:15

## 2019-04-19 RX ADMIN — Medication 100 MILLIGRAM(S): at 15:24

## 2019-04-19 RX ADMIN — Medication 100 MILLIGRAM(S): at 21:46

## 2019-04-19 RX ADMIN — Medication 400 MILLIGRAM(S): at 15:23

## 2019-04-19 RX ADMIN — TRAMADOL HYDROCHLORIDE 100 MILLIGRAM(S): 50 TABLET ORAL at 12:44

## 2019-04-19 RX ADMIN — SPIRONOLACTONE 25 MILLIGRAM(S): 25 TABLET, FILM COATED ORAL at 05:50

## 2019-04-19 RX ADMIN — Medication 25 MILLIGRAM(S): at 20:51

## 2019-04-19 RX ADMIN — ENOXAPARIN SODIUM 40 MILLIGRAM(S): 100 INJECTION SUBCUTANEOUS at 08:01

## 2019-04-19 RX ADMIN — Medication 1 TABLET(S): at 21:46

## 2019-04-19 RX ADMIN — HYDROMORPHONE HYDROCHLORIDE 0.5 MILLIGRAM(S): 2 INJECTION INTRAMUSCULAR; INTRAVENOUS; SUBCUTANEOUS at 21:05

## 2019-04-19 RX ADMIN — Medication 20 MILLIEQUIVALENT(S): at 11:43

## 2019-04-19 RX ADMIN — Medication 40 MILLIGRAM(S): at 05:50

## 2019-04-19 RX ADMIN — Medication 30 MILLIGRAM(S): at 05:50

## 2019-04-19 RX ADMIN — Medication 975 MILLIGRAM(S): at 21:45

## 2019-04-19 RX ADMIN — Medication 30 MILLIGRAM(S): at 06:05

## 2019-04-19 RX ADMIN — Medication 100 MILLIGRAM(S): at 05:50

## 2019-04-19 RX ADMIN — Medication 1 TABLET(S): at 05:50

## 2019-04-19 RX ADMIN — PANTOPRAZOLE SODIUM 40 MILLIGRAM(S): 20 TABLET, DELAYED RELEASE ORAL at 08:02

## 2019-04-19 RX ADMIN — POLYETHYLENE GLYCOL 3350 17 GRAM(S): 17 POWDER, FOR SOLUTION ORAL at 11:43

## 2019-04-19 RX ADMIN — Medication 400 MILLIGRAM(S): at 07:00

## 2019-04-19 RX ADMIN — Medication 1 TABLET(S): at 15:23

## 2019-04-19 RX ADMIN — TRAMADOL HYDROCHLORIDE 100 MILLIGRAM(S): 50 TABLET ORAL at 11:44

## 2019-04-19 NOTE — DISCHARGE NOTE NURSING/CASE MANAGEMENT/SOCIAL WORK - NSDCDPATPORTLINK_GEN_ALL_CORE
You can access the [facility] Portal, offered by [facility], by registering with the following website: [email or web address]

## 2019-04-19 NOTE — OCCUPATIONAL THERAPY INITIAL EVALUATION ADULT - TRANSFER SAFETY CONCERNS NOTED: BED/CHAIR, REHAB EVAL
decreased step length/stand pivot/squat pivot/stepping too close to front of assistive device/decreased weight-shifting ability

## 2019-04-19 NOTE — DISCHARGE NOTE NURSING/CASE MANAGEMENT/SOCIAL WORK - CASE MANAGER'S NAME
Skilled Nursing, Physical Therapy, and Occupational Therapy evaluations will be provided. Rochelle Colon [facility]--#[phone]

## 2019-04-19 NOTE — OCCUPATIONAL THERAPY INITIAL EVALUATION ADULT - ADDITIONAL COMMENTS
Prior to admission, pt was functioning in her roles, self sufficient & ambulating independently w single axis cane. Presently, pt needs assistance with functional mobility and to complete self-care  tasks due to pain,  weakness stiffness and decreased ROM from left TKR. The scale below depicts a picture of the pt's current level of functioning. Barthel Index: Feeding Score__10___, Bathing Score__0___, Grooming Score__5___, Dressing Score___5__, Bowel Score__0___, Bladder Score__0___, Toilet Score___0__, Transfer Score___5___, Mobility Score__10___, Stairs Score__5___, Total Score__35/100___. Prior to admission, pt was functioning in her roles, self sufficient & ambulating independently with a single axis cane. Presently, pt needs assistance with functional mobility and to complete self-care  tasks due to pain,  weakness stiffness and decreased ROM from left TKR. The scale below depicts a picture of the pt's current level of functioning. Barthel Index: Feeding Score__10___, Bathing Score__0___, Grooming Score__5___, Dressing Score___5__, Bowel Score__0___, Bladder Score__0___, Toilet Score___0__, Transfer Score___5___, Mobility Score__10___, Stairs Score__5___, Total Score__35/100___.

## 2019-04-19 NOTE — OCCUPATIONAL THERAPY INITIAL EVALUATION ADULT - GENERAL OBSERVATIONS, REHAB EVAL
Pt was seen for initial OT consult, encountered in bed on cardiac monitoring in NAD ; family is at bedside. Pt was AA&Ox4, cooperative & followed commands. Pt c/o left knee pain due to s/p TKR; this limits pt's activity tolerance ,balance, ADL management and functional mobility.

## 2019-04-19 NOTE — OCCUPATIONAL THERAPY INITIAL EVALUATION ADULT - LIVES WITH, PROFILE
a private house with 4 entry , equipped with wide hand rails. Once inside, pt has to negotiate a flight of stairs to access the bedroom and bathroom. The bathroom has a stall shower, grab bars , fixed shower head and standard toilet grab bars

## 2019-04-19 NOTE — OCCUPATIONAL THERAPY INITIAL EVALUATION ADULT - RANGE OF MOTION EXAMINATION, LOWER EXTREMITY
Right LE Active ROM was WFL   (within functional limits)/Right LE Passive ROM was WFL  (within functional limits)/AAROM in left knee is 0-30 degrees with pain.

## 2019-04-19 NOTE — PROGRESS NOTE ADULT - SUBJECTIVE AND OBJECTIVE BOX
Patient is seen and examined at bedside. Denies CP/SOB/Dizziness/N/V/D/HA. Pain is controlled.     Vital Signs Last 24 Hrs  T(C): 36.4 (18 Apr 2019 18:34), Max: 36.4 (18 Apr 2019 18:34)  T(F): 97.5 (18 Apr 2019 18:34), Max: 97.5 (18 Apr 2019 18:34)  HR: 75 (18 Apr 2019 18:34) (65 - 81)  BP: 128/89 (18 Apr 2019 18:34) (92/49 - 142/78)  BP(mean): --  RR: 17 (18 Apr 2019 18:34) (14 - 20)  SpO2: 97% (18 Apr 2019 18:34) (96% - 99%)    GEN: NAD  LLE: Dressing C/D/I.   Motor intact + EHL/FHL/TA/GS in the BL LE. Sensation is grossly intact distal . Extremity warm. Compartments are soft. DP 1+    Labs:                          11.5   6.40  )-----------( 303      ( 18 Apr 2019 14:36 )             38.8       04-18    139  |  110<H>  |  37<H>  ----------------------------<  143<H>  4.1   |  21<L>  |  1.56<H>    Ca    9.5      18 Apr 2019 14:36        A/P: Patient is a 73y y/o Female s/p L TKA, POD # 1    -FU HMV outputs likely pull   -Pain control/analgesia  -Inc spirometry  -Venodynes/foot pumps  -F/U AM Labs  -PT/OT/WBAT  -Antibiotic  -Anticoagulation  -Dispo planning, wants home.

## 2019-04-19 NOTE — OCCUPATIONAL THERAPY INITIAL EVALUATION ADULT - COORDINATION ASSESSED, REHAB EVAL
intact in BUE; diminished in RLE/finger to nose/heel to shin heel to shin/intact in BUE; diminished in RLE/finger to nose

## 2019-04-20 VITALS — HEART RATE: 92 BPM | TEMPERATURE: 98 F

## 2019-04-20 LAB
ANION GAP SERPL CALC-SCNC: 6 MMOL/L — SIGNIFICANT CHANGE UP (ref 5–17)
BUN SERPL-MCNC: 26 MG/DL — HIGH (ref 7–23)
CALCIUM SERPL-MCNC: 10.3 MG/DL — HIGH (ref 8.5–10.1)
CHLORIDE SERPL-SCNC: 106 MMOL/L — SIGNIFICANT CHANGE UP (ref 96–108)
CO2 SERPL-SCNC: 25 MMOL/L — SIGNIFICANT CHANGE UP (ref 22–31)
CREAT SERPL-MCNC: 1.02 MG/DL — SIGNIFICANT CHANGE UP (ref 0.5–1.3)
GLUCOSE BLDC GLUCOMTR-MCNC: 127 MG/DL — HIGH (ref 70–99)
GLUCOSE BLDC GLUCOMTR-MCNC: 128 MG/DL — HIGH (ref 70–99)
GLUCOSE SERPL-MCNC: 119 MG/DL — HIGH (ref 70–99)
HCT VFR BLD CALC: 37.8 % — SIGNIFICANT CHANGE UP (ref 34.5–45)
HGB BLD-MCNC: 11.3 G/DL — LOW (ref 11.5–15.5)
MCHC RBC-ENTMCNC: 25.4 PG — LOW (ref 27–34)
MCHC RBC-ENTMCNC: 29.9 GM/DL — LOW (ref 32–36)
MCV RBC AUTO: 84.9 FL — SIGNIFICANT CHANGE UP (ref 80–100)
NRBC # BLD: 0 /100 WBCS — SIGNIFICANT CHANGE UP (ref 0–0)
PLATELET # BLD AUTO: 286 K/UL — SIGNIFICANT CHANGE UP (ref 150–400)
POTASSIUM SERPL-MCNC: 4.5 MMOL/L — SIGNIFICANT CHANGE UP (ref 3.5–5.3)
POTASSIUM SERPL-SCNC: 4.5 MMOL/L — SIGNIFICANT CHANGE UP (ref 3.5–5.3)
RBC # BLD: 4.45 M/UL — SIGNIFICANT CHANGE UP (ref 3.8–5.2)
RBC # FLD: 20.7 % — HIGH (ref 10.3–14.5)
SODIUM SERPL-SCNC: 137 MMOL/L — SIGNIFICANT CHANGE UP (ref 135–145)
WBC # BLD: 9.73 K/UL — SIGNIFICANT CHANGE UP (ref 3.8–10.5)
WBC # FLD AUTO: 9.73 K/UL — SIGNIFICANT CHANGE UP (ref 3.8–10.5)

## 2019-04-20 RX ORDER — TRAMADOL HYDROCHLORIDE 50 MG/1
1 TABLET ORAL
Qty: 0 | Refills: 0 | DISCHARGE
Start: 2019-04-20

## 2019-04-20 RX ORDER — TRAMADOL HYDROCHLORIDE 50 MG/1
2 TABLET ORAL
Qty: 0 | Refills: 0 | DISCHARGE
Start: 2019-04-20

## 2019-04-20 RX ADMIN — Medication 1 TABLET(S): at 12:52

## 2019-04-20 RX ADMIN — TRAMADOL HYDROCHLORIDE 100 MILLIGRAM(S): 50 TABLET ORAL at 15:00

## 2019-04-20 RX ADMIN — Medication 40 MILLIGRAM(S): at 06:07

## 2019-04-20 RX ADMIN — TRAMADOL HYDROCHLORIDE 100 MILLIGRAM(S): 50 TABLET ORAL at 14:00

## 2019-04-20 RX ADMIN — Medication 975 MILLIGRAM(S): at 15:00

## 2019-04-20 RX ADMIN — Medication 1 TABLET(S): at 13:57

## 2019-04-20 RX ADMIN — Medication 975 MILLIGRAM(S): at 14:00

## 2019-04-20 RX ADMIN — Medication 100 MILLIGRAM(S): at 13:57

## 2019-04-20 RX ADMIN — Medication 100 MILLIGRAM(S): at 06:07

## 2019-04-20 RX ADMIN — Medication 25 MILLIGRAM(S): at 06:07

## 2019-04-20 RX ADMIN — Medication 975 MILLIGRAM(S): at 07:10

## 2019-04-20 RX ADMIN — Medication 180 MILLIGRAM(S): at 06:07

## 2019-04-20 RX ADMIN — TRAMADOL HYDROCHLORIDE 100 MILLIGRAM(S): 50 TABLET ORAL at 09:53

## 2019-04-20 RX ADMIN — Medication 20 MILLIEQUIVALENT(S): at 12:52

## 2019-04-20 RX ADMIN — TRAMADOL HYDROCHLORIDE 100 MILLIGRAM(S): 50 TABLET ORAL at 10:53

## 2019-04-20 RX ADMIN — HYDROMORPHONE HYDROCHLORIDE 0.5 MILLIGRAM(S): 2 INJECTION INTRAMUSCULAR; INTRAVENOUS; SUBCUTANEOUS at 01:58

## 2019-04-20 RX ADMIN — LOSARTAN POTASSIUM 100 MILLIGRAM(S): 100 TABLET, FILM COATED ORAL at 06:07

## 2019-04-20 RX ADMIN — Medication 975 MILLIGRAM(S): at 06:10

## 2019-04-20 RX ADMIN — Medication 25 MILLIGRAM(S): at 01:46

## 2019-04-20 RX ADMIN — HYDROMORPHONE HYDROCHLORIDE 0.5 MILLIGRAM(S): 2 INJECTION INTRAMUSCULAR; INTRAVENOUS; SUBCUTANEOUS at 06:07

## 2019-04-20 RX ADMIN — SPIRONOLACTONE 25 MILLIGRAM(S): 25 TABLET, FILM COATED ORAL at 06:07

## 2019-04-20 RX ADMIN — Medication 1 TABLET(S): at 06:07

## 2019-04-20 RX ADMIN — HYDROMORPHONE HYDROCHLORIDE 0.5 MILLIGRAM(S): 2 INJECTION INTRAMUSCULAR; INTRAVENOUS; SUBCUTANEOUS at 01:43

## 2019-04-20 RX ADMIN — ENOXAPARIN SODIUM 40 MILLIGRAM(S): 100 INJECTION SUBCUTANEOUS at 09:10

## 2019-04-20 RX ADMIN — PANTOPRAZOLE SODIUM 40 MILLIGRAM(S): 20 TABLET, DELAYED RELEASE ORAL at 06:07

## 2019-04-20 RX ADMIN — HYDROMORPHONE HYDROCHLORIDE 0.5 MILLIGRAM(S): 2 INJECTION INTRAMUSCULAR; INTRAVENOUS; SUBCUTANEOUS at 06:25

## 2019-04-20 NOTE — PROGRESS NOTE ADULT - SUBJECTIVE AND OBJECTIVE BOX
Patient is seen and examined at bedside. Denies CP/SOB/Dizziness/N/V/D/HA. Pain meds adjusted yesterday, better controlled since.  Pt likely going to [facility] today.     Vital Signs Last 24 Hrs  T(C): 37.3 (20 Apr 2019 05:01), Max: 37.4 (19 Apr 2019 09:16)  T(F): 99.1 (20 Apr 2019 05:01), Max: 99.4 (19 Apr 2019 09:16)  HR: 96 (20 Apr 2019 05:01) (96 - 109)  BP: 150/77 (20 Apr 2019 05:01) (102/- - 150/77)  RR: 17 (20 Apr 2019 05:01) (17 - 18)  SpO2: 96% (20 Apr 2019 05:01) (95% - 98%)    GEN: NAD  LLE: Dressing C/D/I.   Motor intact + EHL/FHL/TA/GS in the BL LE. Sensation is grossly intact distal . Extremity warm. Compartments are soft. DP 2+ with brisk cap refill, no calf TTP.        A/P: Patient is a 73y y/o Female s/p L TKA, POD # 2     -Pain control/analgesia  -Inc spirometry  -Venodynes/foot pumps  - AM Labs stable  -PT/OT/WBAT  - Lovenox 40mg SQ daily   -Dispo planning- [facility] today vs tomorrow pending bed

## 2019-04-22 LAB — SURGICAL PATHOLOGY STUDY: SIGNIFICANT CHANGE UP

## 2019-04-23 ENCOUNTER — INBOUND DOCUMENT (OUTPATIENT)
Age: 74
End: 2019-04-23

## 2019-04-26 DIAGNOSIS — Z98.890 OTHER SPECIFIED POSTPROCEDURAL STATES: ICD-10-CM

## 2019-04-26 DIAGNOSIS — Z96.651 PRESENCE OF RIGHT ARTIFICIAL KNEE JOINT: ICD-10-CM

## 2019-04-26 DIAGNOSIS — Z88.0 ALLERGY STATUS TO PENICILLIN: ICD-10-CM

## 2019-04-26 DIAGNOSIS — G47.33 OBSTRUCTIVE SLEEP APNEA (ADULT) (PEDIATRIC): ICD-10-CM

## 2019-04-26 DIAGNOSIS — E11.9 TYPE 2 DIABETES MELLITUS WITHOUT COMPLICATIONS: ICD-10-CM

## 2019-04-26 DIAGNOSIS — K21.9 GASTRO-ESOPHAGEAL REFLUX DISEASE WITHOUT ESOPHAGITIS: ICD-10-CM

## 2019-04-26 DIAGNOSIS — M17.12 UNILATERAL PRIMARY OSTEOARTHRITIS, LEFT KNEE: ICD-10-CM

## 2019-04-26 DIAGNOSIS — Z88.5 ALLERGY STATUS TO NARCOTIC AGENT: ICD-10-CM

## 2019-04-26 DIAGNOSIS — I10 ESSENTIAL (PRIMARY) HYPERTENSION: ICD-10-CM

## 2019-04-26 DIAGNOSIS — M21.062 VALGUS DEFORMITY, NOT ELSEWHERE CLASSIFIED, LEFT KNEE: ICD-10-CM

## 2019-04-26 DIAGNOSIS — E78.5 HYPERLIPIDEMIA, UNSPECIFIED: ICD-10-CM

## 2019-05-10 ENCOUNTER — APPOINTMENT (OUTPATIENT)
Dept: ORTHOPEDIC SURGERY | Facility: CLINIC | Age: 74
End: 2019-05-10
Payer: MEDICARE

## 2019-05-10 PROCEDURE — 99024 POSTOP FOLLOW-UP VISIT: CPT

## 2019-05-10 NOTE — HISTORY OF PRESENT ILLNESS
[de-identified] : Post-op visit [de-identified] : Patient presents today for the F/U S/P Left TKR done 3 weeks ago. Patient is doing well and undergoing P.T> 3 times a week. Aspirin  mg PO BID for DVT prophylaxis. I went over post-op care and answered all her questions. I also gave her the surgical report for her records. [de-identified] : ROM 0-90 degrees [de-identified] : Doing well S/P TKR [de-identified] : Continue P.T., pain management and DVT prophylaxis. F/U in 1 month with the x-rays

## 2019-05-10 NOTE — PROCEDURE
[de-identified] : Observation on incision dry, clean, intact, well healed. Method staple removing kit. Suture site Cleaned with iodine swab after sutures are completely removed. Instructions Keep incision dry and clean, allowed to shower and pat site dry, do not rub dry, contact office is site becomes red, swollen, infected, or you develop a fever. \par \par

## 2019-05-12 PROBLEM — G47.30 SLEEP APNEA, UNSPECIFIED: Chronic | Status: ACTIVE | Noted: 2019-04-05

## 2019-05-12 PROBLEM — E11.9 TYPE 2 DIABETES MELLITUS WITHOUT COMPLICATIONS: Chronic | Status: ACTIVE | Noted: 2019-04-05

## 2019-05-12 PROBLEM — K21.9 GASTRO-ESOPHAGEAL REFLUX DISEASE WITHOUT ESOPHAGITIS: Chronic | Status: ACTIVE | Noted: 2019-04-05

## 2019-05-12 PROBLEM — E78.5 HYPERLIPIDEMIA, UNSPECIFIED: Chronic | Status: ACTIVE | Noted: 2019-04-05

## 2019-05-16 ENCOUNTER — RX RENEWAL (OUTPATIENT)
Age: 74
End: 2019-05-16

## 2019-06-07 ENCOUNTER — APPOINTMENT (OUTPATIENT)
Age: 74
End: 2019-06-07
Payer: MEDICARE

## 2019-06-07 VITALS
SYSTOLIC BLOOD PRESSURE: 124 MMHG | RESPIRATION RATE: 16 BRPM | TEMPERATURE: 98.1 F | DIASTOLIC BLOOD PRESSURE: 76 MMHG | HEART RATE: 86 BPM | OXYGEN SATURATION: 96 %

## 2019-06-07 DIAGNOSIS — M25.561 PAIN IN RIGHT KNEE: ICD-10-CM

## 2019-06-07 PROCEDURE — 99024 POSTOP FOLLOW-UP VISIT: CPT

## 2019-06-07 NOTE — HISTORY OF PRESENT ILLNESS
[de-identified] : Janina Gayle -  73y y/o Female s/p L TKA done by Dr. Ramírez 4/18/2019. Patient pain controlled with Hydrocodone/Acet as needed. Patient with 2 medication left and require refill. Patient continue PT in an outpatient setting  (Perfection therapy). Patient c/o left ankle swelling and discoloration in ankle area (eczema like rash) as per patient. Left knee incision healed and area clean - left ankle area slightly swollen, with some darken area, no bruise or rash noted. Denies fever/chills, chest pain, SOB, calf pain/tenderness. Patient continue  mg twice daily with only a few left to complete therapy. She denies abdominal pain, constipation or diarrhea. Had f/u appt with Dr. ramírez and next appt 6/12/2019. Patient ambulating with cane.  [FreeTextEntry1] : swelling to left ankle, discoloration to LLE and refil for pain medication

## 2019-06-07 NOTE — ASSESSMENT
[FreeTextEntry1] : S/P LTKA\par Continue current pain medication regimen\par Continue outpatient PT\par Continue current bowel regimen\par Continue DVT prophylaxis with ASA until complete\par elevated LLE as instructed\par F/u with Dr. Ramírez as scheduled\par

## 2019-06-07 NOTE — PHYSICAL EXAM
[No Acute Distress] : no acute distress [Well Nourished] : well nourished [Well-Appearing] : well-appearing [Well Developed] : well developed [PERRL] : pupils equal round and reactive to light [Normal Sclera/Conjunctiva] : normal sclera/conjunctiva [EOMI] : extraocular movements intact [Normal Outer Ear/Nose] : the outer ears and nose were normal in appearance [Normal Oropharynx] : the oropharynx was normal [Supple] : supple [No JVD] : no jugular venous distention [No Respiratory Distress] : no respiratory distress  [No Lymphadenopathy] : no lymphadenopathy [Thyroid Normal, No Nodules] : the thyroid was normal and there were no nodules present [Clear to Auscultation] : lungs were clear to auscultation bilaterally [No Accessory Muscle Use] : no accessory muscle use [Regular Rhythm] : with a regular rhythm [Normal Rate] : normal rate  [Normal S1, S2] : normal S1 and S2 [No Carotid Bruits] : no carotid bruits [No Murmur] : no murmur heard [No Abdominal Bruit] : a ~M bruit was not heard ~T in the abdomen [Pedal Pulses Present] : the pedal pulses are present [No Varicosities] : no varicosities [No Edema] : there was no peripheral edema [No Extremity Clubbing/Cyanosis] : no extremity clubbing/cyanosis [No Palpable Aorta] : no palpable aorta [Soft] : abdomen soft [Non-distended] : non-distended [Non Tender] : non-tender [No Masses] : no abdominal mass palpated [No HSM] : no HSM [Normal Bowel Sounds] : normal bowel sounds [Normal Posterior Cervical Nodes] : no posterior cervical lymphadenopathy [No CVA Tenderness] : no CVA  tenderness [Normal Anterior Cervical Nodes] : no anterior cervical lymphadenopathy [Grossly Normal Strength/Tone] : grossly normal strength/tone [No Joint Swelling] : no joint swelling [No Spinal Tenderness] : no spinal tenderness [No Rash] : no rash [Coordination Grossly Intact] : coordination grossly intact [No Focal Deficits] : no focal deficits [Normal Gait] : normal gait [Deep Tendon Reflexes (DTR)] : deep tendon reflexes were 2+ and symmetric [Normal Affect] : the affect was normal [Normal Insight/Judgement] : insight and judgment were intact

## 2019-06-07 NOTE — PLAN
[FreeTextEntry1] : Patient to call CN with any question/concerns. Yellow card with CN contact information provided.Will continue to follow-up

## 2019-06-12 ENCOUNTER — APPOINTMENT (OUTPATIENT)
Dept: ORTHOPEDIC SURGERY | Facility: CLINIC | Age: 74
End: 2019-06-12
Payer: MEDICARE

## 2019-06-12 DIAGNOSIS — Z47.1 AFTERCARE FOLLOWING JOINT REPLACEMENT SURGERY: ICD-10-CM

## 2019-06-12 DIAGNOSIS — Z96.652 AFTERCARE FOLLOWING JOINT REPLACEMENT SURGERY: ICD-10-CM

## 2019-06-12 PROCEDURE — 73562 X-RAY EXAM OF KNEE 3: CPT | Mod: LT

## 2019-06-12 PROCEDURE — 73560 X-RAY EXAM OF KNEE 1 OR 2: CPT | Mod: RT

## 2019-06-12 PROCEDURE — 99024 POSTOP FOLLOW-UP VISIT: CPT

## 2019-06-12 NOTE — HISTORY OF PRESENT ILLNESS
[de-identified] : 74 year old female presents for follow up evaluation s/p left TKR at 6 weeks. Patient is doing well and making great progress with PT. She is undergoing PT and gradually tapering her use of pain medication. Overall she is very happy with her outcome.

## 2019-06-12 NOTE — ADDENDUM
[FreeTextEntry1] : This note was written by Navya Jerry on 06/12/2019 acting as scribe for Dr. Joselo Ramírez M.D.\par \par I, Dr. Joselo Ramírez M.D., have read and attest that all the information, medical decision making and discharge instructions within are true and accurate.\par

## 2019-06-12 NOTE — DISCUSSION/SUMMARY
[de-identified] : Patient is doing well following their left TKR at 6 weeks. They will continue PT and activities as tolerated. Patient will follow up with x-rays in 6-8 weeks. This was explained in the presence of their daughter.

## 2019-06-12 NOTE — PHYSICAL EXAM
[de-identified] : Left Knee\par Inspection: no effusion\par Wounds: healed midline incision\par Alignment: normal.\par Palpation: no specific tenderness on palpation.\par ROM: Active (in degrees) 0-110\par Ligamentous laxity (neg): negative ant. drawer test, negative post. drawer test, stable to varus stress test, stable to valgus stress test,\par Patellofemoral Alignment Test: Q angle-, normal.\par Muscle Test: good quad strength.\par Leg examination: calf is soft and non-tender. [de-identified] : Left knee xrays, AP, lateral, merchant view taken at the office today demonstrates a total knee replacement in satisfactory position and alignment. No evidence of loosening. The patella sits in a central position\par \par Right knee xray merchant view demonstrates a total knee replacement in satisfactory position and alignment with the patella in a central position

## 2019-07-17 ENCOUNTER — FORM ENCOUNTER (OUTPATIENT)
Age: 74
End: 2019-07-17

## 2019-07-17 ENCOUNTER — APPOINTMENT (OUTPATIENT)
Dept: PULMONOLOGY | Facility: CLINIC | Age: 74
End: 2019-07-17

## 2019-09-20 ENCOUNTER — APPOINTMENT (OUTPATIENT)
Dept: ORTHOPEDIC SURGERY | Facility: CLINIC | Age: 74
End: 2019-09-20
Payer: MEDICARE

## 2019-09-20 PROCEDURE — 73562 X-RAY EXAM OF KNEE 3: CPT | Mod: LT

## 2019-09-20 PROCEDURE — 73560 X-RAY EXAM OF KNEE 1 OR 2: CPT | Mod: RT

## 2019-09-20 PROCEDURE — 99213 OFFICE O/P EST LOW 20 MIN: CPT

## 2019-09-20 NOTE — DISCUSSION/SUMMARY
[de-identified] : Patient is doing well following their left TKR at 6 months. They can continue activities as tolerated. Patient may follow up with x-rays in 6 months.

## 2019-09-20 NOTE — HISTORY OF PRESENT ILLNESS
[de-identified] : 74 year old female presents for follow up evaluation s/p left TKR at 6 months. Patient is doing well. She reports that her knee is functioning well aside from some residual ankle swelling. Patient states she has no pain and is happy with her ROM. Patient denies taking any medications.

## 2019-09-20 NOTE — ADDENDUM
[FreeTextEntry1] : This note was written by Navya Jerry on 09/20/2019 acting as scribe for Dr. Joselo Ramírez M.D.\par \par I, Dr. Joselo Ramírez M.D., have read and attest that all the information, medical decision making and discharge instructions within are true and accurate.\par

## 2019-09-20 NOTE — PHYSICAL EXAM
[de-identified] : Left Knee\par Inspection: no effusion\par Wounds: healed midline incision\par Alignment: normal.\par Palpation: no specific tenderness on palpation.\par ROM: Active (in degrees) 0-125\par Ligamentous laxity (neg): negative ant. drawer test, negative post. drawer test, stable to varus stress test, stable to valgus stress test,\par Patellofemoral Alignment Test: Q angle-, normal.\par Muscle Test: good quad strength.\par Leg examination: calf is soft and non-tender. [de-identified] : Left knee xrays, AP, lateral, merchant view taken at the office today demonstrates a total knee replacement in satisfactory position and alignment. No evidence of loosening. The patella sits in a central position\par \par Right knee xray merchant view demonstrates a total knee replacement in satisfactory position and alignment with the patella in a central position

## 2020-03-20 ENCOUNTER — APPOINTMENT (OUTPATIENT)
Dept: ORTHOPEDIC SURGERY | Facility: CLINIC | Age: 75
End: 2020-03-20

## 2020-06-24 ENCOUNTER — RESULT REVIEW (OUTPATIENT)
Age: 75
End: 2020-06-24

## 2020-06-26 ENCOUNTER — APPOINTMENT (OUTPATIENT)
Dept: ORTHOPEDIC SURGERY | Facility: CLINIC | Age: 75
End: 2020-06-26
Payer: MEDICARE

## 2020-06-26 DIAGNOSIS — S90.32XA CONTUSION OF LEFT FOOT, INITIAL ENCOUNTER: ICD-10-CM

## 2020-06-26 DIAGNOSIS — Z96.651 PRESENCE OF RIGHT ARTIFICIAL KNEE JOINT: ICD-10-CM

## 2020-06-26 DIAGNOSIS — Z96.652 PRESENCE OF LEFT ARTIFICIAL KNEE JOINT: ICD-10-CM

## 2020-06-26 PROCEDURE — 73562 X-RAY EXAM OF KNEE 3: CPT | Mod: LT

## 2020-06-26 PROCEDURE — 73560 X-RAY EXAM OF KNEE 1 OR 2: CPT | Mod: RT

## 2020-06-26 PROCEDURE — 99213 OFFICE O/P EST LOW 20 MIN: CPT

## 2020-06-26 NOTE — DISCUSSION/SUMMARY
[de-identified] : Patient is overall doing well following their left TKR at 1 year. I reviewed x-ray films with them. She has a resolving contusion at this time. I am more concerned at this time about her bilateral lower leg edema, as she is on anti hypertensive medications as well as a diuretic. She will follow up with her PCP. Her right TKR is also doing well at this time.  They can continue activities as tolerated. Patient may follow up with x-rays of both knees in 1 year.

## 2020-06-26 NOTE — HISTORY OF PRESENT ILLNESS
[de-identified] : 75 year old female presents for follow up evaluation s/p left TKR at 1 year and 2 months. Patient was doing well with regard to her knee, but reports she has taken two falls onto her left knee in the last 3 months, one in March 2020 and more recently in June. She states she was able to ambulate but did have increased achiness about her left knee. She used tylenol, and iced and elevated her knee. She is here for an annual check.

## 2020-06-26 NOTE — PHYSICAL EXAM
[de-identified] : Left Knee\par Inspection: no effusion\par Wounds: healed midline incision\par Alignment: normal.\par Palpation: peripatellar tenderness on palpation both medially and laterally\par ROM: Active (in degrees) 0-115\par Ligamentous laxity (neg): negative ant. drawer test, negative post. drawer test, stable to varus stress test, stable to valgus stress test,\par Patellofemoral Alignment Test: Q angle-, normal.\par Muscle Test: good quad strength.\par Leg examination: calf is soft and non-tender.\par \par Right Knee\par Inspection: no effusion\par Wounds: healed midline incision\par Alignment: normal.\par Palpation: no specific tenderness on palpation.\par ROM: Active (in degrees) 0-120\par Ligamentous laxity (neg): negative ant. drawer test, negative post. drawer test, stable to varus stress test, stable to valgus stress test,\par Patellofemoral Alignment Test: Q angle-, normal.\par Muscle Test: good quad strength.\par Leg examination: calf is soft and non-tender.  [de-identified] : Left knee xrays, AP, lateral, merchant view taken at the office today demonstrates a total knee replacement in satisfactory position and alignment. No evidence of loosening. The patella sits in a central position\par \par Right knee xray merchant view demonstrates a total knee replacement in satisfactory position and alignment with the patella in a central position

## 2020-06-26 NOTE — ADDENDUM
[FreeTextEntry1] : This note was written by Navya Jerry on 06/26/2020 acting as scribe for Dr. Joselo Ramírez M.D.\par \par I, Dr. Joselo Ramírez M.D., have read and attest that all the information, medical decision making and discharge instructions within are true and accurate.

## 2020-08-03 ENCOUNTER — APPOINTMENT (OUTPATIENT)
Dept: PULMONOLOGY | Facility: CLINIC | Age: 75
End: 2020-08-03
Payer: MEDICARE

## 2020-08-03 VITALS
DIASTOLIC BLOOD PRESSURE: 79 MMHG | TEMPERATURE: 97.5 F | OXYGEN SATURATION: 94 % | SYSTOLIC BLOOD PRESSURE: 149 MMHG | HEART RATE: 90 BPM

## 2020-08-03 DIAGNOSIS — I28.8 OTHER DISEASES OF PULMONARY VESSELS: ICD-10-CM

## 2020-08-03 DIAGNOSIS — R60.0 LOCALIZED EDEMA: ICD-10-CM

## 2020-08-03 PROCEDURE — 71046 X-RAY EXAM CHEST 2 VIEWS: CPT

## 2020-08-03 PROCEDURE — 36415 COLL VENOUS BLD VENIPUNCTURE: CPT

## 2020-08-03 PROCEDURE — 99215 OFFICE O/P EST HI 40 MIN: CPT | Mod: 25

## 2020-08-03 RX ORDER — HYDROCODONE BITARTRATE AND ACETAMINOPHEN 10; 300 MG/1; MG/1
10-300 TABLET ORAL
Qty: 60 | Refills: 0 | Status: DISCONTINUED | COMMUNITY
Start: 2019-04-19 | End: 2020-08-03

## 2020-08-03 RX ORDER — EMPAGLIFLOZIN 25 MG/1
25 TABLET, FILM COATED ORAL
Refills: 0 | Status: DISCONTINUED | COMMUNITY
End: 2020-08-03

## 2020-08-03 RX ORDER — HYDROCODONE BITARTRATE AND ACETAMINOPHEN 10; 300 MG/1; MG/1
10-300 TABLET ORAL
Qty: 40 | Refills: 0 | Status: DISCONTINUED | COMMUNITY
Start: 2019-05-16 | End: 2020-08-03

## 2020-08-03 RX ORDER — CELECOXIB 200 MG/1
200 CAPSULE ORAL
Qty: 180 | Refills: 0 | Status: DISCONTINUED | COMMUNITY
Start: 2017-09-09 | End: 2020-08-03

## 2020-08-04 PROBLEM — R60.0 EDEMA, LEG: Status: ACTIVE | Noted: 2020-08-04

## 2020-08-04 LAB
ALBUMIN SERPL ELPH-MCNC: 4.8 G/DL
ALP BLD-CCNC: 65 U/L
ALT SERPL-CCNC: 16 U/L
ANION GAP SERPL CALC-SCNC: 12 MMOL/L
AST SERPL-CCNC: 15 U/L
BASOPHILS # BLD AUTO: 0.05 K/UL
BASOPHILS NFR BLD AUTO: 0.9 %
BILIRUB SERPL-MCNC: 0.3 MG/DL
BUN SERPL-MCNC: 24 MG/DL
CALCIUM SERPL-MCNC: 10.5 MG/DL
CHLORIDE SERPL-SCNC: 108 MMOL/L
CO2 SERPL-SCNC: 20 MMOL/L
CREAT SERPL-MCNC: 1.16 MG/DL
DEPRECATED D DIMER PPP IA-ACNC: 464 NG/ML DDU
EOSINOPHIL # BLD AUTO: 0.17 K/UL
EOSINOPHIL NFR BLD AUTO: 3.1 %
GLUCOSE SERPL-MCNC: 112 MG/DL
HCT VFR BLD CALC: 41.4 %
HGB BLD-MCNC: 12.6 G/DL
IMM GRANULOCYTES NFR BLD AUTO: 0.4 %
LYMPHOCYTES # BLD AUTO: 1.39 K/UL
LYMPHOCYTES NFR BLD AUTO: 25.5 %
MAN DIFF?: NORMAL
MCHC RBC-ENTMCNC: 27.5 PG
MCHC RBC-ENTMCNC: 30.4 GM/DL
MCV RBC AUTO: 90.2 FL
MONOCYTES # BLD AUTO: 0.71 K/UL
MONOCYTES NFR BLD AUTO: 13 %
NEUTROPHILS # BLD AUTO: 3.11 K/UL
NEUTROPHILS NFR BLD AUTO: 57.1 %
NT-PROBNP SERPL-MCNC: 51 PG/ML
PLATELET # BLD AUTO: 246 K/UL
POTASSIUM SERPL-SCNC: 4.6 MMOL/L
PROT SERPL-MCNC: 7.5 G/DL
RBC # BLD: 4.59 M/UL
RBC # FLD: 15.9 %
SODIUM SERPL-SCNC: 140 MMOL/L
WBC # FLD AUTO: 5.45 K/UL

## 2020-08-04 NOTE — PHYSICAL EXAM
[Normal Oropharynx] : normal oropharynx [No Acute Distress] : no acute distress [Normal Appearance] : normal appearance [Normal Rate/Rhythm] : normal rate/rhythm [No Neck Mass] : no neck mass [No Murmurs] : no murmurs [No Resp Distress] : no resp distress [Normal S1, S2] : normal s1, s2 [No Abnormalities] : no abnormalities [Clear to Auscultation Bilaterally] : clear to auscultation bilaterally [Benign] : benign [Normal Gait] : normal gait [No Cyanosis] : no cyanosis [Normal Color/ Pigmentation] : normal color/ pigmentation [FROM] : FROM [Normal Affect] : normal affect [No Focal Deficits] : no focal deficits [Oriented x3] : oriented x3 [TextBox_2] : Beast female [TextBox_105] : 3+ nonpitting edema left lower extremity

## 2020-08-04 NOTE — ASSESSMENT
[FreeTextEntry1] : Cough, enlarged right pulmonary artery on chest x-ray, edema left lower extremity, history of left knee replacement\par Sleep apnea\par Labs show moderate elevation of d-dimer\par Recommend CT of chest with contrast to rule out thromboembolism and will also need venous Doppler study of left lower extremity.\par In regards to DURAN -patient is currently on treatment with PAP. Data download confirms excellent compliance. Patient continues to use treatment and is benefiting from it.\par Etiology of persistent cough unclear may be related to reflux.  Will assess lungs on CT.\par

## 2020-08-04 NOTE — HISTORY OF PRESENT ILLNESS
[TextBox_4] : History of sleep apnea-currently on CPAP\par History of abnormal chest x-ray\par Here for persistent cough\par Cough described as nonproductive\par Also reports shortness of breath\par Also notes persistent edema of left lower extremity after knee replacement surgery last year\par

## 2020-08-08 ENCOUNTER — APPOINTMENT (OUTPATIENT)
Dept: ULTRASOUND IMAGING | Facility: IMAGING CENTER | Age: 75
End: 2020-08-08
Payer: MEDICARE

## 2020-08-08 ENCOUNTER — APPOINTMENT (OUTPATIENT)
Dept: CT IMAGING | Facility: IMAGING CENTER | Age: 75
End: 2020-08-08
Payer: MEDICARE

## 2020-08-08 ENCOUNTER — OUTPATIENT (OUTPATIENT)
Dept: OUTPATIENT SERVICES | Facility: HOSPITAL | Age: 75
LOS: 1 days | End: 2020-08-08
Payer: MEDICARE

## 2020-08-08 DIAGNOSIS — Z90.721 ACQUIRED ABSENCE OF OVARIES, UNILATERAL: Chronic | ICD-10-CM

## 2020-08-08 DIAGNOSIS — R79.89 OTHER SPECIFIED ABNORMAL FINDINGS OF BLOOD CHEMISTRY: ICD-10-CM

## 2020-08-08 DIAGNOSIS — Z96.659 PRESENCE OF UNSPECIFIED ARTIFICIAL KNEE JOINT: Chronic | ICD-10-CM

## 2020-08-08 DIAGNOSIS — R60.0 LOCALIZED EDEMA: ICD-10-CM

## 2020-08-08 DIAGNOSIS — I28.8 OTHER DISEASES OF PULMONARY VESSELS: ICD-10-CM

## 2020-08-08 DIAGNOSIS — Z98.891 HISTORY OF UTERINE SCAR FROM PREVIOUS SURGERY: Chronic | ICD-10-CM

## 2020-08-08 DIAGNOSIS — Z98.890 OTHER SPECIFIED POSTPROCEDURAL STATES: Chronic | ICD-10-CM

## 2020-08-08 PROCEDURE — 93971 EXTREMITY STUDY: CPT

## 2020-08-08 PROCEDURE — 71275 CT ANGIOGRAPHY CHEST: CPT

## 2020-08-08 PROCEDURE — 71275 CT ANGIOGRAPHY CHEST: CPT | Mod: 26

## 2020-08-08 PROCEDURE — 93971 EXTREMITY STUDY: CPT | Mod: 26,LT

## 2020-08-09 ENCOUNTER — TRANSCRIPTION ENCOUNTER (OUTPATIENT)
Age: 75
End: 2020-08-09

## 2020-08-21 ENCOUNTER — APPOINTMENT (OUTPATIENT)
Dept: PULMONOLOGY | Facility: CLINIC | Age: 75
End: 2020-08-21
Payer: MEDICARE

## 2020-08-21 VITALS
DIASTOLIC BLOOD PRESSURE: 79 MMHG | HEART RATE: 96 BPM | TEMPERATURE: 97.1 F | RESPIRATION RATE: 15 BRPM | SYSTOLIC BLOOD PRESSURE: 141 MMHG | OXYGEN SATURATION: 90 %

## 2020-08-21 PROCEDURE — 99213 OFFICE O/P EST LOW 20 MIN: CPT

## 2020-08-21 NOTE — ASSESSMENT
[FreeTextEntry1] : Persistent cough.  Chest CT negative for PE or parenchymal lung disease does show some evidence of a nonhomogeneous airways suggestive of possible asthma.  PFTs negative for asthma in the past.  Will give trial of Trelegy inhaler for 2 weeks.\par \par No evidence of congestive heart failure by CT scan and proBNP is negative.\par \par Patient having issues with CPAP machine feels like she receiving inadequate pressure I asked her to bring her into her device at her next appointment so I can adjust the pressure settings.

## 2020-08-21 NOTE — HISTORY OF PRESENT ILLNESS
[TextBox_4] : History of sleep apnea-currently on CPAP\par History of abnormal chest x-ray\par Here for persistent cough\par Cough described as nonproductive\par Also reports shortness of breath\par Also notes persistent edema of left lower extremity after knee replacement surgery last year\par Went for CT chest and venous Doppler.\par Reports history of 911 exposure was working in [facility] and walking through tunnel at that time. [Never] : never

## 2020-08-21 NOTE — PHYSICAL EXAM
[No Acute Distress] : no acute distress [Normal Appearance] : normal appearance [Normal Oropharynx] : normal oropharynx [No Neck Mass] : no neck mass [Normal Rate/Rhythm] : normal rate/rhythm [Normal S1, S2] : normal s1, s2 [No Resp Distress] : no resp distress [No Murmurs] : no murmurs [Clear to Auscultation Bilaterally] : clear to auscultation bilaterally [No Abnormalities] : no abnormalities [Normal Gait] : normal gait [Benign] : benign [FROM] : FROM [Normal Color/ Pigmentation] : normal color/ pigmentation [No Cyanosis] : no cyanosis [No Focal Deficits] : no focal deficits [TextBox_2] : Beast female [Normal Affect] : normal affect [Oriented x3] : oriented x3 [TextBox_105] : 3+ nonpitting edema left lower extremity

## 2020-09-04 ENCOUNTER — APPOINTMENT (OUTPATIENT)
Dept: PULMONOLOGY | Facility: CLINIC | Age: 75
End: 2020-09-04
Payer: MEDICARE

## 2020-09-04 VITALS
DIASTOLIC BLOOD PRESSURE: 76 MMHG | HEART RATE: 86 BPM | TEMPERATURE: 97.7 F | HEIGHT: 61 IN | SYSTOLIC BLOOD PRESSURE: 130 MMHG | BODY MASS INDEX: 54.75 KG/M2 | WEIGHT: 290 LBS | OXYGEN SATURATION: 93 %

## 2020-09-04 PROCEDURE — 90662 IIV NO PRSV INCREASED AG IM: CPT

## 2020-09-04 PROCEDURE — G0008: CPT

## 2020-09-04 PROCEDURE — 99213 OFFICE O/P EST LOW 20 MIN: CPT | Mod: 25

## 2020-09-04 NOTE — PHYSICAL EXAM
[No Acute Distress] : no acute distress [Normal Oropharynx] : normal oropharynx [Normal Appearance] : normal appearance [No Neck Mass] : no neck mass [Normal Rate/Rhythm] : normal rate/rhythm [Normal S1, S2] : normal s1, s2 [No Resp Distress] : no resp distress [No Murmurs] : no murmurs [No Abnormalities] : no abnormalities [Clear to Auscultation Bilaterally] : clear to auscultation bilaterally [Benign] : benign [Normal Gait] : normal gait [No Clubbing] : no clubbing [No Cyanosis] : no cyanosis [FROM] : FROM [No Edema] : no edema [Normal Color/ Pigmentation] : normal color/ pigmentation [No Focal Deficits] : no focal deficits [Oriented x3] : oriented x3 [Normal Affect] : normal affect

## 2020-09-05 NOTE — HISTORY OF PRESENT ILLNESS
[Never] : never [Obstructive Sleep Apnea] : obstructive sleep apnea [APAP:] : APAP [Nasal mask] : nasal mask [TextBox_4] : TORI 75 year female  came to the office today offered pressure issues with A Pap and has been self adjusting her pressure setting that has not helped. \par she is otherwise well and in her usual state of health. \par  \par   [TextBox_127] : 8/5/20 [TextBox_129] : 9/2/20 [TextBox_133] : 100 [TextBox_137] : 93.3 [TextBox_141] : 6 [TextBox_143] : 31 [TextBox_147] : 7.9

## 2020-09-24 NOTE — PHYSICAL THERAPY INITIAL EVALUATION ADULT - WEIGHT-BEARING RESTRICTIONS: SIT/STAND, REHAB EVAL
Additional Notes: Due to proximity to the eye, recommended ophthalmologist for removal. Patient states she will defer for now. Detail Level: Detailed Additional Notes: Single area treated with LN2 at N/C Additional Notes: Patient will use otc compound W as needed. Additional Notes: Gave patient Dr. Moreno’s information if she would like to inquire about laser removal. Additional Notes: At least 4/10 toenails impacted clinically.  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  She feels best about trying JUBLIA for now.  This was sent in.  May consider pulse TERBINAFINE in the future.  No h/o liver problems. weight-bearing as tolerated/LLE

## 2020-11-03 ENCOUNTER — APPOINTMENT (OUTPATIENT)
Dept: PULMONOLOGY | Facility: CLINIC | Age: 75
End: 2020-11-03
Payer: MEDICARE

## 2020-11-03 VITALS
HEART RATE: 108 BPM | RESPIRATION RATE: 17 BRPM | OXYGEN SATURATION: 91 % | SYSTOLIC BLOOD PRESSURE: 143 MMHG | DIASTOLIC BLOOD PRESSURE: 78 MMHG | TEMPERATURE: 97.3 F

## 2020-11-03 PROCEDURE — 99213 OFFICE O/P EST LOW 20 MIN: CPT

## 2020-11-04 PROCEDURE — 95718 EEG PHYS/QHP 2-12 HR W/VEEG: CPT

## 2020-11-04 PROCEDURE — 94762 N-INVAS EAR/PLS OXIMTRY CONT: CPT

## 2020-11-10 NOTE — ADDENDUM
[FreeTextEntry1] : Overnight oximetry demonstrates persistent oxygen desaturation.  Recommend further increase in pressures and clinical follow-up 1 month if unable to tolerate pressures or if hypoxemia persists consider titration study

## 2020-11-10 NOTE — PHYSICAL EXAM
[No Acute Distress] : no acute distress [Normal Oropharynx] : normal oropharynx [Normal Appearance] : normal appearance [No Neck Mass] : no neck mass [Normal Rate/Rhythm] : normal rate/rhythm [Normal S1, S2] : normal s1, s2 [No Murmurs] : no murmurs [No Resp Distress] : no resp distress [Clear to Auscultation Bilaterally] : clear to auscultation bilaterally [No Abnormalities] : no abnormalities [Benign] : benign [Normal Gait] : normal gait [No Cyanosis] : no cyanosis [FROM] : FROM [Normal Color/ Pigmentation] : normal color/ pigmentation [No Focal Deficits] : no focal deficits [Oriented x3] : oriented x3 [Normal Affect] : normal affect [TextBox_2] : Obese female [TextBox_105] : 3+ nonpitting edema left lower extremity

## 2020-11-10 NOTE — ASSESSMENT
[FreeTextEntry1] : DURAN appears better controlled with higher pressure settings.  She has residual hypersomnia with an ESS of 15.  Recommend overnight oximetry in light of severe oxygen desaturation noted on initial diagnostic testing

## 2020-12-15 ENCOUNTER — APPOINTMENT (OUTPATIENT)
Dept: PULMONOLOGY | Facility: CLINIC | Age: 75
End: 2020-12-15
Payer: MEDICARE

## 2020-12-15 VITALS
HEART RATE: 102 BPM | SYSTOLIC BLOOD PRESSURE: 150 MMHG | OXYGEN SATURATION: 94 % | DIASTOLIC BLOOD PRESSURE: 83 MMHG | TEMPERATURE: 98.3 F

## 2020-12-15 DIAGNOSIS — R93.89 ABNORMAL FINDINGS ON DIAGNOSTIC IMAGING OF OTHER SPECIFIED BODY STRUCTURES: ICD-10-CM

## 2020-12-15 PROCEDURE — 99213 OFFICE O/P EST LOW 20 MIN: CPT

## 2020-12-17 PROBLEM — R93.89 ABNORMAL CHEST XRAY: Status: RESOLVED | Noted: 2020-08-04 | Resolved: 2020-12-17

## 2020-12-17 NOTE — ASSESSMENT
[FreeTextEntry1] : Patient is currently on treatment with PAP. Data download confirms excellent compliance. Patient continues to use treatment and is benefiting from it.\par Check overnight oximetry\par Noted enlarged pulmonary artery on chest x-ray-no correlating finding on CT scan.  No follow-up required on this issue

## 2020-12-17 NOTE — HISTORY OF PRESENT ILLNESS
[TextBox_4] : Follow-up for sleep apnea and chronic airways disease.  Had recent episode of bronchitis was treated with antibiotics and steroids by PCP.  Breathing back to baseline.  At last office visit was noted to have oxygen desaturation on CPAP.  Settings adjusted.  She is here for data download and repeat overnight oximetry\par \par

## 2020-12-17 NOTE — PROCEDURE
[FreeTextEntry1] : Data download demonstrates significant interval improvement AHI 2.2 on current setting of AutoPap 11-15.

## 2021-02-05 NOTE — H&P PST ADULT - ALCOHOL USE HISTORY SINGLE SELECT
Please notify patient.   Take -atorvastatin 20 mg -hydrochlorothiazide 12.5 mg from cardiologist   CANCEL  [facility] Appointments  4/8/2021   1:30 PM    MD simona Selby never

## 2021-02-18 ENCOUNTER — APPOINTMENT (OUTPATIENT)
Dept: PULMONOLOGY | Facility: CLINIC | Age: 76
End: 2021-02-18

## 2021-05-05 DIAGNOSIS — Z20.822 CONTACT WITH AND (SUSPECTED) EXPOSURE TO COVID-19: ICD-10-CM

## 2021-06-29 ENCOUNTER — APPOINTMENT (OUTPATIENT)
Dept: PULMONOLOGY | Facility: CLINIC | Age: 76
End: 2021-06-29
Payer: MEDICARE

## 2021-06-29 VITALS
HEART RATE: 92 BPM | DIASTOLIC BLOOD PRESSURE: 72 MMHG | TEMPERATURE: 97.6 F | SYSTOLIC BLOOD PRESSURE: 113 MMHG | OXYGEN SATURATION: 93 %

## 2021-06-29 VITALS
HEART RATE: 80 BPM | SYSTOLIC BLOOD PRESSURE: 103 MMHG | OXYGEN SATURATION: 97 % | DIASTOLIC BLOOD PRESSURE: 67 MMHG | BODY MASS INDEX: 55.32 KG/M2 | HEIGHT: 61 IN | RESPIRATION RATE: 18 BRPM | TEMPERATURE: 97.2 F | WEIGHT: 293 LBS

## 2021-06-29 LAB — POCT - HEMOGLOBIN (HGB), QUANTITATIVE, TRANSCUTANEOUS: 12

## 2021-06-29 PROCEDURE — 95012 NITRIC OXIDE EXP GAS DETER: CPT

## 2021-06-29 PROCEDURE — 94729 DIFFUSING CAPACITY: CPT

## 2021-06-29 PROCEDURE — 94010 BREATHING CAPACITY TEST: CPT

## 2021-06-29 PROCEDURE — 88738 HGB QUANT TRANSCUTANEOUS: CPT

## 2021-06-29 PROCEDURE — 94727 GAS DIL/WSHOT DETER LNG VOL: CPT

## 2021-06-29 PROCEDURE — ZZZZZ: CPT

## 2021-06-29 PROCEDURE — 99214 OFFICE O/P EST MOD 30 MIN: CPT | Mod: 25

## 2021-06-30 ENCOUNTER — APPOINTMENT (OUTPATIENT)
Dept: ORTHOPEDIC SURGERY | Facility: CLINIC | Age: 76
End: 2021-06-30
Payer: MEDICARE

## 2021-06-30 VITALS — HEIGHT: 61 IN | BODY MASS INDEX: 55.32 KG/M2 | WEIGHT: 293 LBS

## 2021-06-30 DIAGNOSIS — M17.12 UNILATERAL PRIMARY OSTEOARTHRITIS, LEFT KNEE: ICD-10-CM

## 2021-06-30 DIAGNOSIS — M17.11 UNILATERAL PRIMARY OSTEOARTHRITIS, RIGHT KNEE: ICD-10-CM

## 2021-06-30 PROCEDURE — 73562 X-RAY EXAM OF KNEE 3: CPT | Mod: 50

## 2021-06-30 PROCEDURE — 99213 OFFICE O/P EST LOW 20 MIN: CPT

## 2021-06-30 RX ORDER — BRIMONIDINE TARTRATE 2 MG/MG
0.2 SOLUTION/ DROPS OPHTHALMIC
Qty: 10 | Refills: 0 | Status: ACTIVE | COMMUNITY
Start: 2021-05-04

## 2021-06-30 RX ORDER — BLOOD PRESSURE TEST KIT-MEDIUM
KIT MISCELLANEOUS
Qty: 1 | Refills: 0 | Status: ACTIVE | COMMUNITY
Start: 2021-03-12

## 2021-06-30 RX ORDER — BIMATOPROST 0.1 MG/ML
0.01 SOLUTION/ DROPS OPHTHALMIC
Qty: 8 | Refills: 0 | Status: ACTIVE | COMMUNITY
Start: 2021-06-13

## 2021-06-30 NOTE — DISCUSSION/SUMMARY
[de-identified] : Discussed at length the nature of the patient’s condition. Their bilateral knee TKR is doing well overall.\par \par I recommended she continue with her home exercise program, and weight management program. An evaluation with her PCP was recommended for her lower leg edema. FU in 1 year

## 2021-06-30 NOTE — PHYSICAL EXAM
[de-identified] : Left Knee\par Inspection: no effusion\par Wounds: healed midline incision\par Alignment: normal.\par Palpation: peripatellar tenderness on palpation both medially and laterally\par ROM: Active (in degrees) 0-120\par Ligamentous laxity (neg): negative ant. drawer test, negative post. drawer test, stable to varus stress test, stable to valgus stress test,\par Patellofemoral Alignment Test: Q angle-, normal.\par Muscle Test: good quad strength.\par Leg examination: calf is soft and non-tender.\par \par Right Knee\par Inspection: no effusion\par Wounds: healed midline incision\par Alignment: normal.\par Palpation: no specific tenderness on palpation.\par ROM: Active (in degrees) 0-120\par Ligamentous laxity (neg): negative ant. drawer test, negative post. drawer test, stable to varus stress test, stable to valgus stress test,\par Patellofemoral Alignment Test: Q angle-, normal.\par Muscle Test: good quad strength.\par Leg examination: calf is soft and non-tender.  [de-identified] : left knee xray, AP, lateral, merchant view taken at the office today demonstrates a total knee replacement in satisfactory position and alignment. No evidence of loosening. The patella sits in a central position and appropriate height\par \par right knee xray, AP, lateral, merchant view taken at the office today demonstrates a total knee replacement in satisfactory position and alignment. No evidence of loosening. The patella sits in a central position and appropriate height.

## 2021-06-30 NOTE — HISTORY OF PRESENT ILLNESS
[de-identified] : Ms. TORI WOOTEN is a 76 year female who presents today for a follow up on her bilateral TKR (right 2011, left 4/2019). She notes doing well, but has not been active in the past year. She notes occasional left discomfort in the left knee around her tibia. She denies taking any medications and would like to continue activities on her own.\par

## 2021-06-30 NOTE — ADDENDUM
[FreeTextEntry1] : This note was written by Miguel Alcocer on 06/30/2021 acting as scribe for Dr. Joselo Ramírez M.D.\par \par I, Dr. Joselo Ramírez, have read and attest that all the information, medical decision making and discharge instructions within are true and accurate.

## 2021-07-01 NOTE — PROCEDURE
[FreeTextEntry1] : CPAP compliance excellent AHI 2.3 on CPAP\par PFT reviewed obesity related loss of ERV otherwise normal PFT

## 2021-07-01 NOTE — ASSESSMENT
[FreeTextEntry1] : Patient is currently on treatment with PAP. Data download confirms excellent compliance. Patient continues to use treatment and is benefiting from it.\par Patient was informed about the recall of [employer] RespirLeatt CPAP machines.  Risks and benefits of continuing treatment versus interruption of treatment discussed.  Patient encouraged to go to the  website and to reach out to DME regarding device upgrade and/or replacement.\par \par Continue inhalers as prescribed

## 2021-07-01 NOTE — HISTORY OF PRESENT ILLNESS
[TextBox_4] : Follow-up for sleep apnea chronic dyspnea.  Reports generalized fatigue apparently has pituitary issue work-up is ongoing reports ongoing use with CPAP without difficulty.\par Here for PFTs

## 2021-07-20 ENCOUNTER — RESULT REVIEW (OUTPATIENT)
Age: 76
End: 2021-07-20

## 2021-07-26 NOTE — OCCUPATIONAL THERAPY INITIAL EVALUATION ADULT - PAIN SENSATION, RLE, REHAB EVAL
Cherise, BP's were 138/98 and 160/90.  Please advise if any changes or continue to monitor.   within normal limits

## 2021-07-29 NOTE — OCCUPATIONAL THERAPY INITIAL EVALUATION ADULT - PAIN SENSATION, RUE, REHAB EVAL
[facility]  [address]  [facility] [address]    July 30, 2021    Sophie Acharya  [address]   [facility] [address]      Dear Sophie,    I have been attempting to reach you since our last contact. I would like to continue to work with you and provide any additional support you may need on achieving your health care related goals. I would appreciate if you would give me a call at [phone] to let me know if you would like to continue working together. I know that there are many things that can affect our ability to communicate and I hope we can continue to work together.    All of us at the [facility] are invested in your health and are here to assist you in meeting your goals.     Sincerely,    Rachael Whitlock  Community Health Worker  [facility]  [phone]     within normal limits

## 2021-10-05 ENCOUNTER — APPOINTMENT (OUTPATIENT)
Dept: PULMONOLOGY | Facility: CLINIC | Age: 76
End: 2021-10-05
Payer: MEDICARE

## 2021-10-05 VITALS
OXYGEN SATURATION: 93 % | TEMPERATURE: 97.2 F | SYSTOLIC BLOOD PRESSURE: 113 MMHG | HEART RATE: 91 BPM | DIASTOLIC BLOOD PRESSURE: 76 MMHG

## 2021-10-05 PROCEDURE — 99213 OFFICE O/P EST LOW 20 MIN: CPT | Mod: 25

## 2021-10-05 PROCEDURE — 90662 IIV NO PRSV INCREASED AG IM: CPT

## 2021-10-05 PROCEDURE — G0008: CPT

## 2021-10-05 RX ORDER — FLUTICASONE FUROATE, UMECLIDINIUM BROMIDE AND VILANTEROL TRIFENATATE 100; 62.5; 25 UG/1; UG/1; UG/1
100-62.5-25 POWDER RESPIRATORY (INHALATION) DAILY
Qty: 1 | Refills: 5 | Status: DISCONTINUED | COMMUNITY
Start: 2020-08-21 | End: 2021-10-05

## 2021-10-07 NOTE — HISTORY OF PRESENT ILLNESS
[TextBox_4] : Follow-up for sleep apnea.  Using auto CPAP on a nightly basis with good compliance.  Is aware of recall on device and is awaiting device replacement.\par Reports issues with nasal congestion and having difficulty with humidifier.\par Having difficulty finding right balance of over and under humidification and also has nasal congestion.  Recommend addition of nasal steroids.

## 2021-10-07 NOTE — PROCEDURE
[FreeTextEntry1] : Compliance Summary\par 9/7/2021 - 10/6/2021 (30 days)\par Days with Device Usage 30 days\par Days without Device Usage 0 days\par Percent Days with Device Usage 100.0%\par Cumulative Usage 9 days 11 hrs. 22 mins. 39 secs.\par Maximum Usage (1 Day) 10 hrs. 29 mins. 51 secs.\par Average Usage (All Days) 7 hrs. 34 mins. 45 secs.\par Average Usage (Days Used) 7 hrs. 34 mins. 45 secs.\par Minimum Usage (1 Day) 4 hrs. 28 mins. 35 secs.\par Percent of Days with Usage >= 4 Hours 100.0%\par Percent of Days with Usage < 4 Hours 0.0%\par Date Range\par Total Blower Time 9 days 17 hrs. 57 mins. 17 secs.\par Average AHI 1.1\par Auto-CPAP Summary\par Auto-CPAP Mean Pressure 11.4 cmH2O\par Auto-CPAP Peak Average Pressure 12.4 cmH2O\par Device Pressure <= 90% of Time 12.3 cmH2O\par Average Time in Large Leak Per Day 11 mins. 48 secs

## 2021-10-20 ENCOUNTER — RX RENEWAL (OUTPATIENT)
Age: 76
End: 2021-10-20

## 2022-01-04 ENCOUNTER — APPOINTMENT (OUTPATIENT)
Dept: PULMONOLOGY | Facility: CLINIC | Age: 77
End: 2022-01-04
Payer: MEDICARE

## 2022-01-04 VITALS
HEART RATE: 106 BPM | DIASTOLIC BLOOD PRESSURE: 74 MMHG | SYSTOLIC BLOOD PRESSURE: 118 MMHG | TEMPERATURE: 97.6 F | OXYGEN SATURATION: 92 %

## 2022-01-04 PROCEDURE — 99213 OFFICE O/P EST LOW 20 MIN: CPT

## 2022-01-06 NOTE — HISTORY OF PRESENT ILLNESS
[TextBox_4] : Follow-up for sleep apnea.  Using auto CPAP on a nightly basis with good compliance.  Is aware of recall on device and is awaiting device replacement.\par Using CPAP regularly without difficulty.  Still did not receive replacement device.  Some exertional dyspnea.

## 2022-01-06 NOTE — ASSESSMENT
[FreeTextEntry1] : Patient is currently on treatment with PAP. Data download confirms excellent compliance. Patient continues to use treatment and is benefiting from it.\par \par Dyspnea likely secondary to combination of obesity and deconditioning.  Advised to increase ambulation as tolerated\par

## 2022-01-06 NOTE — PROCEDURE
[FreeTextEntry1] : Compliance data reviewed AHI 1.6 average usage 6 hours\par \par Patient ambulated in hallway no significant oxygen desaturation

## 2022-06-28 ENCOUNTER — APPOINTMENT (OUTPATIENT)
Dept: PULMONOLOGY | Facility: CLINIC | Age: 77
End: 2022-06-28

## 2022-06-28 VITALS
SYSTOLIC BLOOD PRESSURE: 143 MMHG | BODY MASS INDEX: 51.92 KG/M2 | DIASTOLIC BLOOD PRESSURE: 83 MMHG | TEMPERATURE: 98 F | WEIGHT: 275 LBS | HEIGHT: 61 IN | OXYGEN SATURATION: 95 % | HEART RATE: 99 BPM

## 2022-06-28 PROCEDURE — 94010 BREATHING CAPACITY TEST: CPT

## 2022-06-28 PROCEDURE — 94729 DIFFUSING CAPACITY: CPT

## 2022-06-28 PROCEDURE — ZZZZZ: CPT

## 2022-06-28 PROCEDURE — 94727 GAS DIL/WSHOT DETER LNG VOL: CPT

## 2022-06-28 PROCEDURE — 99214 OFFICE O/P EST MOD 30 MIN: CPT | Mod: 25

## 2022-06-30 NOTE — ASSESSMENT
[FreeTextEntry1] : Patient is currently on treatment with PAP. Data download confirms excellent compliance. Patient continues to use treatment and is benefiting from it.\par Eligible for device replacement as current device more than 5 years old will order\par \par Follow-up for compliance assessment.\par \par Stable from pulmonary perspective otherwise

## 2022-06-30 NOTE — PROCEDURE
[FreeTextEntry1] : PFT with stable spirometry.  DLCO has declined but matches for volume.\par \par Compliance Summary\par 5/30/2022 - 6/28/2022 (30 days)\par Days with Device Usage 28 days\par Days without Device Usage 2 days\par Percent Days with Device Usage 93.3%\par Cumulative Usage 7 days 4 hrs. 4 mins. 8 secs.\par Maximum Usage (1 Day) 10 hrs. 12 mins. 20 secs.\par Average Usage (All Days) 5 hrs. 44 mins. 8 secs.\par Average Usage (Days Used) 6 hrs. 8 mins. 43 secs.\par Minimum Usage (1 Day) 3 hrs. 7 mins. 54 secs.\par Percent of Days with Usage >= 4 Hours 83.3%\par Percent of Days with Usage < 4 Hours 16.7%\par Date Range\par Total Blower Time 7 days 7 hrs. 6 mins. 24 secs.\par Average AHI 1.4\par Auto-CPAP Summary\par Auto-CPAP Mean Pressure 11.4 cmH2O\par Auto-CPAP Peak Average Pressure 12.1 cmH2O\par Device Pressure <= 90% of Time 12.0 cmH2O\par Average Time in Large Leak Per Day 15 mins. 19 secs.

## 2022-06-30 NOTE — HISTORY OF PRESENT ILLNESS
[TextBox_4] : Follow-up for sleep apnea, chronic dyspnea.  Continues on Trelegy.\par Still using recalled CPAP device.  Using on nightly basis without difficulty.

## 2022-07-18 ENCOUNTER — APPOINTMENT (OUTPATIENT)
Dept: ORTHOPEDIC SURGERY | Facility: CLINIC | Age: 77
End: 2022-07-18

## 2022-07-18 VITALS — HEIGHT: 60 IN | BODY MASS INDEX: 54.38 KG/M2 | WEIGHT: 277 LBS

## 2022-07-18 PROCEDURE — 73562 X-RAY EXAM OF KNEE 3: CPT | Mod: 50

## 2022-07-18 PROCEDURE — 99213 OFFICE O/P EST LOW 20 MIN: CPT

## 2022-07-18 NOTE — PHYSICAL EXAM
[de-identified] : Left knee\par         Inspection: no effusion or erythema.  pendulous thigh \par         Wounds: healed midline surgical scar  \par         Alignment: normal.  \par         Palpation: no specific tenderness on palpation.  \par         ROM active (in degrees):0-120\par         Ligamentous laxity: all ligaments appear stable, , negative ant. drawer test, negative post. drawer test, stable to varus stress test, stable to valgus stress test.  \par          Patellofemoral Alignment Test: Q angle-, normal.  \par         Muscle Test: good quad strength.  \par \par      Right knee\par         Inspection: no effusion or erythema.  pendulous thigh \par         Wounds: healed midline surgical scar  \par         Alignment: normal.  \par         Palpation:. No tenderness to palpation  \par         ROM active (in degrees):, 0-120\par         Ligamentous laxity : all ligaments appear stable, negative ant. drawer test, negative post. drawer test, stable to varus stress test, stable to valgus stress test.  \par          Patellofermoral Alignment Test: Q-angle-, normal.  \par         Muscle Test: good quad strength.  \par \par  [de-identified] : Right knee Three radiographic views taken in the office today, AP, Lateral and Merchant views  show a total knee replacement, components in good position and alignment, no evidence of loosening, patella at appropriate height and central position \par \par Left knee Three radiographic views taken in the office today, AP, Lateral and Merchant views  show a total knee replacement, components in good position and alignment, no evidence of loosening, patella at appropriate height and central position

## 2022-07-18 NOTE — DISCUSSION/SUMMARY
[de-identified] : Her bilateral TKA are doing well, her issue is her weight which is impacting her ambulation, I have recommended weight loss and physiotherapy to improve her gait, fall prevention and strengthening\par Activities as tolerated and cane for ambulation \par Followup in 1 year with xrays

## 2022-08-03 ENCOUNTER — APPOINTMENT (OUTPATIENT)
Dept: OBGYN | Facility: CLINIC | Age: 77
End: 2022-08-03

## 2022-08-03 PROCEDURE — 76830 TRANSVAGINAL US NON-OB: CPT

## 2022-08-03 PROCEDURE — 81002 URINALYSIS NONAUTO W/O SCOPE: CPT

## 2022-08-03 PROCEDURE — 99213 OFFICE O/P EST LOW 20 MIN: CPT | Mod: 25

## 2022-08-03 PROCEDURE — 82270 OCCULT BLOOD FECES: CPT

## 2022-11-01 ENCOUNTER — APPOINTMENT (OUTPATIENT)
Dept: PULMONOLOGY | Facility: CLINIC | Age: 77
End: 2022-11-01

## 2022-11-01 VITALS — HEART RATE: 112 BPM | SYSTOLIC BLOOD PRESSURE: 116 MMHG | DIASTOLIC BLOOD PRESSURE: 71 MMHG | OXYGEN SATURATION: 92 %

## 2022-11-01 PROCEDURE — 99214 OFFICE O/P EST MOD 30 MIN: CPT

## 2022-11-03 NOTE — HISTORY OF PRESENT ILLNESS
[TextBox_4] : Here for DURAN followup. Reports no current respiratory symptoms or sleep symptoms. Using PAP on a nightly basis without difficulty. Reports no symptoms of daytime sleepiness. Getting supplies regularly. \par \par Device: AirSense 11\par \par Vendor: [facility] surgical \par \par Settin-20\par \par Mask: full face (F20)\par \par Issues: having leakage \par \par Expressed that at night, due to sweating on the face, the mask would make "gaps" and starts leaking \par \par Asthma meets goals. Denies significant nocturnal symptoms. Rare beta agonist use. Denies significant cough, wheezing, SOB. \par \par Had flu vaccine 22

## 2022-11-03 NOTE — PROCEDURE
[FreeTextEntry1] : Compliance Report\par Compliance\par Payor Standard\par Usage 08/03/2022 - 10/31/2022\par Usage days 89/90 days (99%)\par >= 4 hours 86 days (96%)\par < 4 hours 3 days (3%)\par Usage hours 550 hours 24 minutes\par Average usage (total days) 6 hours 7 minutes\par Average usage (days used) 6 hours 11 minutes\par Median usage (days used) 6 hours 8 minutes\par Total used hours (value since last reset - 10/31/2022) 631 hours\par AirSense 11 AutoSet\par Serial number [id number]\par Mode AutoSet\par Min Pressure 11 cmH2O\par Max Pressure 18 cmH2O\par EPR Ramp Only\par EPR level 1\par Response Standard\par Therapy\par Pressure - cmH2O Median: 12.5 95th percentile: 15.1 Maximum: 16.3\par Leaks - L/min Median: 5.1 95th percentile: 26.1 Maximum: 101.8\par Events per hour AI: 0.3 HI: 2.2 AHI: 2.5\par Apnea Index Central: 0.1 Obstructive: 0.1 Unknown: 0.1\par RERA Index 0.3\par Cheyne-Marte respiration (average duration per night) 0 minutes (0%)

## 2022-11-03 NOTE — ASSESSMENT
[FreeTextEntry1] : Patient is currently on treatment with PAP. Data download confirms excellent compliance. Patient continues to use treatment and is benefiting from it. \par \par Trelegy renewed\par \par F/u in 3 months

## 2022-11-11 ENCOUNTER — RX RENEWAL (OUTPATIENT)
Age: 77
End: 2022-11-11

## 2022-11-30 ENCOUNTER — RX RENEWAL (OUTPATIENT)
Age: 77
End: 2022-11-30

## 2022-12-06 NOTE — ASU PREOP CHECKLIST - ALLERGY BAND ON
Vascular Access Services Notes:    Ultrasound-guided lab draw done without complication. Attempted x1 in the rightupper forearm using a 20 gauge x 1.75 inch BB PIV needle. Lab RN was present to assist.    Time spent with pt - 30 minutes      SKINNY Jung, RN [facility]     done

## 2023-01-17 NOTE — PHYSICAL THERAPY INITIAL EVALUATION ADULT - SHORT TERM MEMORY, REHAB EVAL
intact Consent (Marginal Mandibular)/Introductory Paragraph: The rationale for Mohs was explained to the patient and consent was obtained. The risks, benefits and alternatives to therapy were discussed in detail. Specifically, the risks of damage to the marginal mandibular branch of the facial nerve, infection, scarring, bleeding, prolonged wound healing, incomplete removal, allergy to anesthesia, and recurrence were addressed. Prior to the procedure, the treatment site was clearly identified and confirmed by the patient. All components of Universal Protocol/PAUSE Rule completed.

## 2023-02-03 ENCOUNTER — RX RENEWAL (OUTPATIENT)
Age: 78
End: 2023-02-03

## 2023-02-15 ENCOUNTER — APPOINTMENT (OUTPATIENT)
Dept: PULMONOLOGY | Facility: CLINIC | Age: 78
End: 2023-02-15
Payer: MEDICARE

## 2023-02-15 VITALS — SYSTOLIC BLOOD PRESSURE: 107 MMHG | DIASTOLIC BLOOD PRESSURE: 75 MMHG

## 2023-02-15 DIAGNOSIS — R05.9 COUGH, UNSPECIFIED: ICD-10-CM

## 2023-02-15 PROCEDURE — 99213 OFFICE O/P EST LOW 20 MIN: CPT | Mod: 95

## 2023-02-15 RX ORDER — CELECOXIB 200 MG/1
200 CAPSULE ORAL
Qty: 2 | Refills: 0 | Status: DISCONTINUED | COMMUNITY
Start: 2019-04-10 | End: 2023-02-15

## 2023-02-15 NOTE — HISTORY OF PRESENT ILLNESS
[Never] : never [TextBox_4] : Telehealth follow-up history of chronic airways disease and sleep apnea.  Notes some increase in cough lately and some congestion.  Weight is down does note swelling of left lower extremity.  Also having issues with CPAP machine currently using ResMed machine but having difficulty getting requested mask which is a ResMed F20 mask she is being sent Jesika Respironics masks which she does not want.\par \par

## 2023-02-15 NOTE — PROCEDURE
[FreeTextEntry1] : Usage days 29/30 days (97%)\par >= 4 hours 28 days (93%)\par < 4 hours 1 days (3%)\par Usage hours 206 hours 29 minutes\par Average usage (total days) 6 hours 53 minutes\par Average usage (days used) 7 hours 7 minutes\par Median usage (days used) 7 hours 8 minutes\par Total used hours (value since last reset - 02/14/2023) 1,330 hours\par AirSense 11 AutoSet\par Serial number [id number]\par Mode AutoSet\par Min Pressure 11 cmH2O\par Max Pressure 18 cmH2O\par EPR Ramp Only\par EPR level 1\par Response Standard\par Therapy\par Pressure - cmH2O Median: 11.5 95th percentile: 12.6 Maximum: 13.4\par Leaks - L/min Median: 19.4 95th percentile: 47.2 Maximum: 103.1\par Events per hour AI: 0.1 HI: 1.2 AHI: 1.3\par Apnea Index Central: 0.1 Obstructive: 0.0 Unknown: 0.0\par RERA Index 0.1\par Cheyne-Marte respiration (average duration per night) 0 minutes (0%)

## 2023-02-15 NOTE — REASON FOR VISIT
[Home] : at home, [unfilled] , at the time of the visit. [Other Location: e.g. Home (Enter Location, City,State)___] : at [unfilled] [Family Member] : family member [Patient] : the patient

## 2023-02-15 NOTE — ASSESSMENT
[FreeTextEntry1] : Patient is currently on treatment with PAP. Data download confirms excellent compliance. Patient continues to use treatment and is benefiting from it.\par \par Some leakage noted on CPAP data download-likely needs mask replacement will order\par \par Will give short course of steroids for cough-if no improvement will need office visit

## 2023-02-27 ENCOUNTER — APPOINTMENT (OUTPATIENT)
Dept: PULMONOLOGY | Facility: CLINIC | Age: 78
End: 2023-02-27
Payer: MEDICARE

## 2023-02-27 ENCOUNTER — NON-APPOINTMENT (OUTPATIENT)
Age: 78
End: 2023-02-27

## 2023-02-27 VITALS — HEART RATE: 103 BPM | DIASTOLIC BLOOD PRESSURE: 79 MMHG | OXYGEN SATURATION: 95 % | SYSTOLIC BLOOD PRESSURE: 130 MMHG

## 2023-02-27 LAB
BASOPHILS # BLD AUTO: 0.03 K/UL
BASOPHILS NFR BLD AUTO: 0.4 %
EOSINOPHIL # BLD AUTO: 0.15 K/UL
EOSINOPHIL NFR BLD AUTO: 2.2 %
HCT VFR BLD CALC: 40.1 %
HGB BLD-MCNC: 12 G/DL
IMM GRANULOCYTES NFR BLD AUTO: 0.6 %
LYMPHOCYTES # BLD AUTO: 1.38 K/UL
LYMPHOCYTES NFR BLD AUTO: 20.1 %
MAN DIFF?: NORMAL
MCHC RBC-ENTMCNC: 27.6 PG
MCHC RBC-ENTMCNC: 29.9 GM/DL
MCV RBC AUTO: 92.2 FL
MONOCYTES # BLD AUTO: 0.69 K/UL
MONOCYTES NFR BLD AUTO: 10.1 %
NEUTROPHILS # BLD AUTO: 4.56 K/UL
NEUTROPHILS NFR BLD AUTO: 66.6 %
PLATELET # BLD AUTO: 234 K/UL
RBC # BLD: 4.35 M/UL
RBC # FLD: 16.3 %
WBC # FLD AUTO: 6.85 K/UL

## 2023-02-27 PROCEDURE — 36415 COLL VENOUS BLD VENIPUNCTURE: CPT

## 2023-02-27 PROCEDURE — 99213 OFFICE O/P EST LOW 20 MIN: CPT | Mod: 25

## 2023-02-27 PROCEDURE — 71046 X-RAY EXAM CHEST 2 VIEWS: CPT

## 2023-02-27 NOTE — ASSESSMENT
[FreeTextEntry1] : Ms. TORI WOOTEN is an 77 year old female, history of Obstructive sleep apnea and chronic airway disease. No sign of fluid overload from chest x-ray results today. Will start her a stronger course of steroids to treat her cough.  Check labs to evaluate for congestive heart failure although none evident by chest x-ray

## 2023-02-27 NOTE — HISTORY OF PRESENT ILLNESS
[Never] : never [TextBox_4] : Prior: Telehealth follow-up history of chronic airways disease and sleep apnea.  Notes some increase in cough lately and some congestion.  Weight is down does note swelling of left lower extremity.  Also having issues with CPAP machine currently using ResMed machine but having difficulty getting requested mask which is a ResMed F20 mask she is being sent [employer] Resp[employer]ics masks which she does not want.\par \par Current: TORI WOOTEN is a 77 year old female, with history of chronic airway disease and sleep apnea, who presents to the office for follow up evaluation. Patient reports that her cough has not improved since her last visit and she currently has symptoms of dyspnea. She states of finishing her steroid taper with no symptomatic relief.  Associate worsening shortness of breath with starting meloxicam

## 2023-02-27 NOTE — PROCEDURE
[FreeTextEntry1] : Chest x-ray showed clear lungs, no signs of infiltrate or pleural effusions\par \par Venipuncture for labs.

## 2023-02-27 NOTE — ADDENDUM
[FreeTextEntry1] : I, Alfredo Vanessaallison, acted solely as a scribe for Dr. Ra Valenzuela M.D. on this date 02/27/2023. \par \par All medical record entries made by the Scribe were at my, Dr. Ra Valenzuela M.D., direction and personally dictated by me on 02/27/2023. I have reviewed the chart and agree that the record accurately reflects my personal performance of the history, physical exam, assessment and plan. I have also personally directed, reviewed, and agreed with the chart.

## 2023-02-28 ENCOUNTER — TRANSCRIPTION ENCOUNTER (OUTPATIENT)
Age: 78
End: 2023-02-28

## 2023-02-28 LAB — NT-PROBNP SERPL-MCNC: 91 PG/ML

## 2023-03-02 LAB
ALBUMIN SERPL ELPH-MCNC: 4.3 G/DL
ALP BLD-CCNC: 61 U/L
ALT SERPL-CCNC: 17 U/L
ANION GAP SERPL CALC-SCNC: 12 MMOL/L
AST SERPL-CCNC: 12 U/L
BILIRUB SERPL-MCNC: 0.2 MG/DL
BUN SERPL-MCNC: 34 MG/DL
CALCIUM SERPL-MCNC: 11 MG/DL
CHLORIDE SERPL-SCNC: 108 MMOL/L
CO2 SERPL-SCNC: 22 MMOL/L
CREAT SERPL-MCNC: 1.49 MG/DL
EGFR: 36 ML/MIN/1.73M2
GLUCOSE SERPL-MCNC: 130 MG/DL
POTASSIUM SERPL-SCNC: 5 MMOL/L
PROT SERPL-MCNC: 7 G/DL
SODIUM SERPL-SCNC: 141 MMOL/L

## 2023-03-13 ENCOUNTER — APPOINTMENT (OUTPATIENT)
Dept: PULMONOLOGY | Facility: CLINIC | Age: 78
End: 2023-03-13
Payer: MEDICARE

## 2023-03-13 VITALS — SYSTOLIC BLOOD PRESSURE: 112 MMHG | HEART RATE: 100 BPM | DIASTOLIC BLOOD PRESSURE: 71 MMHG | OXYGEN SATURATION: 94 %

## 2023-03-13 PROCEDURE — 99214 OFFICE O/P EST MOD 30 MIN: CPT | Mod: 25

## 2023-03-13 PROCEDURE — 94010 BREATHING CAPACITY TEST: CPT

## 2023-03-14 NOTE — HISTORY OF PRESENT ILLNESS
[Never] : never [TextBox_4] : Patient here regarding follow-up for shortness of breath chronic airways disease and sleep apnea.  She brought in the [employer]ika DreamStation machine that she received as a replacement for her prior one.  She has been using her old ResMed machine in the interim.  She asked me to set up the DreamStation machine for her which I did with the current setting of 11-18 and full facemask.  Demonstrated use of device.\par At last visit was noted to have significant shortness of breath and exertional dyspnea.  Felt a little better after course of steroids but still very short of breath knowing oxygen desaturation with exertion has upcoming cardiology appointment\par \par

## 2023-03-14 NOTE — ADDENDUM
[FreeTextEntry1] : I, Alfredo Vanessaallison, acted solely as a scribe for Dr. Ra Valenzuela M.D. on this date 03/13/2023. \par \par All medical record entries made by the Scribe were at my, Dr. Ra Valenzuela M.D., direction and personally dictated by me on 03/13/2023. I have reviewed the chart and agree that the record accurately reflects my personal performance of the history, physical exam, assessment and plan. I have also personally directed, reviewed, and agreed with the chart.

## 2023-03-14 NOTE — ASSESSMENT
[FreeTextEntry1] : Ms. TORI WOOTEN is an 77 year old female, history of Obstructive sleep apnea and chronic airway disease. \par \par From pulmonary perspective, advised her to follow up with her Cardiologist for further evaluation of her dyspnea as it is currently unclear at this time. Might need a chest CT scan depending on cardiac findings, \par \par From a sleep perspective, patient is currently on treatment with PAP. Data download confirms excellent compliance. Patient continues to use treatment and is benefiting from it. Configured her new Jesika Respironics machine to match the settings of her previous Resmed machine. \par \par Recorded time for visit excludes procedures done in office and includes review of outside reports and imaging if appropriate and indicated above\par

## 2023-03-14 NOTE — PROCEDURE
[FreeTextEntry1] : Compliance Report\par Compliance\par Payor Standard\par Usage 02/11/2023 - 03/12/2023\par Usage days 30/30 days (100%)\par >= 4 hours 28 days (93%)\par < 4 hours 2 days (7%)\par Usage hours 215 hours 45 minutes\par Average usage (total days) 7 hours 12 minutes\par Average usage (days used) 7 hours 12 minutes\par Median usage (days used) 6 hours 57 minutes\par Total used hours (value since last reset - 03/12/2023) 1,510 hours\par AirSense 11 AutoSet\par Serial number [id number]\par Mode AutoSet\par Min Pressure 11 cmH2O\par Max Pressure 18 cmH2O\par EPR Ramp Only\par EPR level 1\par Response Standard\par Therapy\par Pressure - cmH2O Median: 11.3 95th percentile: 12.2 Maximum: 13.0\par Leaks - L/min Median: 26.3 95th percentile: 53.2 Maximum: 112.6\par Events per hour AI: 0.1 HI: 1.8 AHI: 1.9\par Apnea Index Central: 0.0 Obstructive: 0.0 Unknown: 0.0\par RERA Index 0.1\par Cheyne-Marte respiration (average duration per night) 0 minutes (0%)\par \par Normal spirometry-interval decline noted, however most recent visit did not have spirometry

## 2023-03-17 ENCOUNTER — INPATIENT (INPATIENT)
Facility: HOSPITAL | Age: 78
LOS: 10 days | Discharge: HOME HEALTH | End: 2023-03-28
Attending: INTERNAL MEDICINE | Admitting: INTERNAL MEDICINE
Payer: MEDICARE

## 2023-03-17 ENCOUNTER — TRANSCRIPTION ENCOUNTER (OUTPATIENT)
Age: 78
End: 2023-03-17

## 2023-03-17 VITALS
TEMPERATURE: 98 F | OXYGEN SATURATION: 98 % | HEART RATE: 97 BPM | RESPIRATION RATE: 20 BRPM | DIASTOLIC BLOOD PRESSURE: 57 MMHG | SYSTOLIC BLOOD PRESSURE: 131 MMHG

## 2023-03-17 DIAGNOSIS — Z98.891 HISTORY OF UTERINE SCAR FROM PREVIOUS SURGERY: Chronic | ICD-10-CM

## 2023-03-17 DIAGNOSIS — Z98.890 OTHER SPECIFIED POSTPROCEDURAL STATES: Chronic | ICD-10-CM

## 2023-03-17 DIAGNOSIS — Z96.659 PRESENCE OF UNSPECIFIED ARTIFICIAL KNEE JOINT: Chronic | ICD-10-CM

## 2023-03-17 DIAGNOSIS — Z90.721 ACQUIRED ABSENCE OF OVARIES, UNILATERAL: Chronic | ICD-10-CM

## 2023-03-17 LAB
ALBUMIN SERPL ELPH-MCNC: 3.7 G/DL — SIGNIFICANT CHANGE UP (ref 3.3–5)
ALP SERPL-CCNC: 54 U/L — SIGNIFICANT CHANGE UP (ref 40–120)
ALT FLD-CCNC: 15 U/L — SIGNIFICANT CHANGE UP (ref 4–33)
ANION GAP SERPL CALC-SCNC: 15 MMOL/L — HIGH (ref 7–14)
AST SERPL-CCNC: 25 U/L — SIGNIFICANT CHANGE UP (ref 4–32)
BASE EXCESS BLDV CALC-SCNC: -6.9 MMOL/L — LOW (ref -2–3)
BASOPHILS # BLD AUTO: 0.05 K/UL — SIGNIFICANT CHANGE UP (ref 0–0.2)
BASOPHILS NFR BLD AUTO: 0.6 % — SIGNIFICANT CHANGE UP (ref 0–2)
BILIRUB SERPL-MCNC: 0.2 MG/DL — SIGNIFICANT CHANGE UP (ref 0.2–1.2)
BLOOD GAS VENOUS COMPREHENSIVE RESULT: SIGNIFICANT CHANGE UP
BUN SERPL-MCNC: 42 MG/DL — HIGH (ref 7–23)
CALCIUM SERPL-MCNC: 10.4 MG/DL — SIGNIFICANT CHANGE UP (ref 8.4–10.5)
CHLORIDE BLDV-SCNC: 111 MMOL/L — HIGH (ref 96–108)
CHLORIDE SERPL-SCNC: 109 MMOL/L — HIGH (ref 98–107)
CO2 BLDV-SCNC: 20.4 MMOL/L — LOW (ref 22–26)
CO2 SERPL-SCNC: 15 MMOL/L — LOW (ref 22–31)
CREAT SERPL-MCNC: 1.59 MG/DL — HIGH (ref 0.5–1.3)
D DIMER BLD IA.RAPID-MCNC: 2837 NG/ML DDU — HIGH
EGFR: 33 ML/MIN/1.73M2 — LOW
EOSINOPHIL # BLD AUTO: 0.17 K/UL — SIGNIFICANT CHANGE UP (ref 0–0.5)
EOSINOPHIL NFR BLD AUTO: 2 % — SIGNIFICANT CHANGE UP (ref 0–6)
GAS PNL BLDV: 135 MMOL/L — LOW (ref 136–145)
GAS PNL BLDV: SIGNIFICANT CHANGE UP
GLUCOSE BLDV-MCNC: 101 MG/DL — HIGH (ref 70–99)
GLUCOSE SERPL-MCNC: 106 MG/DL — HIGH (ref 70–99)
HCO3 BLDV-SCNC: 19 MMOL/L — LOW (ref 22–29)
HCT VFR BLD CALC: 36.9 % — SIGNIFICANT CHANGE UP (ref 34.5–45)
HCT VFR BLDA CALC: 34 % — LOW (ref 34.5–46.5)
HGB BLD CALC-MCNC: 11.2 G/DL — LOW (ref 11.7–16.1)
HGB BLD-MCNC: 10.7 G/DL — LOW (ref 11.5–15.5)
IANC: 5.57 K/UL — SIGNIFICANT CHANGE UP (ref 1.8–7.4)
IMM GRANULOCYTES NFR BLD AUTO: 2 % — HIGH (ref 0–0.9)
LACTATE BLDV-MCNC: 1.1 MMOL/L — SIGNIFICANT CHANGE UP (ref 0.5–2)
LYMPHOCYTES # BLD AUTO: 1.84 K/UL — SIGNIFICANT CHANGE UP (ref 1–3.3)
LYMPHOCYTES # BLD AUTO: 21.4 % — SIGNIFICANT CHANGE UP (ref 13–44)
MAGNESIUM SERPL-MCNC: 2.2 MG/DL — SIGNIFICANT CHANGE UP (ref 1.6–2.6)
MCHC RBC-ENTMCNC: 26.2 PG — LOW (ref 27–34)
MCHC RBC-ENTMCNC: 29 GM/DL — LOW (ref 32–36)
MCV RBC AUTO: 90.2 FL — SIGNIFICANT CHANGE UP (ref 80–100)
MONOCYTES # BLD AUTO: 0.79 K/UL — SIGNIFICANT CHANGE UP (ref 0–0.9)
MONOCYTES NFR BLD AUTO: 9.2 % — SIGNIFICANT CHANGE UP (ref 2–14)
NEUTROPHILS # BLD AUTO: 5.57 K/UL — SIGNIFICANT CHANGE UP (ref 1.8–7.4)
NEUTROPHILS NFR BLD AUTO: 64.8 % — SIGNIFICANT CHANGE UP (ref 43–77)
NRBC # BLD: 0 /100 WBCS — SIGNIFICANT CHANGE UP (ref 0–0)
NRBC # FLD: 0 K/UL — SIGNIFICANT CHANGE UP (ref 0–0)
NT-PROBNP SERPL-SCNC: 3049 PG/ML — HIGH
PCO2 BLDV: 40 MMHG — SIGNIFICANT CHANGE UP (ref 39–52)
PH BLDV: 7.29 — LOW (ref 7.32–7.43)
PLATELET # BLD AUTO: 245 K/UL — SIGNIFICANT CHANGE UP (ref 150–400)
PO2 BLDV: 51 MMHG — HIGH (ref 25–45)
POTASSIUM BLDV-SCNC: SIGNIFICANT CHANGE UP MMOL/L (ref 3.5–5.1)
POTASSIUM SERPL-MCNC: 5.4 MMOL/L — HIGH (ref 3.5–5.3)
POTASSIUM SERPL-SCNC: 5.4 MMOL/L — HIGH (ref 3.5–5.3)
PROT SERPL-MCNC: 6.9 G/DL — SIGNIFICANT CHANGE UP (ref 6–8.3)
RBC # BLD: 4.09 M/UL — SIGNIFICANT CHANGE UP (ref 3.8–5.2)
RBC # FLD: 17.1 % — HIGH (ref 10.3–14.5)
SAO2 % BLDV: 80.6 % — SIGNIFICANT CHANGE UP (ref 67–88)
SODIUM SERPL-SCNC: 139 MMOL/L — SIGNIFICANT CHANGE UP (ref 135–145)
TROPONIN T, HIGH SENSITIVITY RESULT: 35 NG/L — SIGNIFICANT CHANGE UP
TSH SERPL-MCNC: 0.16 UIU/ML — LOW (ref 0.27–4.2)
WBC # BLD: 8.59 K/UL — SIGNIFICANT CHANGE UP (ref 3.8–10.5)
WBC # FLD AUTO: 8.59 K/UL — SIGNIFICANT CHANGE UP (ref 3.8–10.5)

## 2023-03-17 PROCEDURE — 99291 CRITICAL CARE FIRST HOUR: CPT

## 2023-03-17 PROCEDURE — 71045 X-RAY EXAM CHEST 1 VIEW: CPT | Mod: 26

## 2023-03-17 NOTE — ED PROVIDER NOTE - PHYSICAL EXAMINATION
GENERAL: no acute distress  HEAD: normocephalic, atraumatic  HEENT: normal conjunctiva, oral mucosa moist, neck supple  CARDIAC: regular rate and rhythm, normal S1 and S2,  no appreciable murmurs  PULM: Tachypneic, speaking in short sentences  GI: abdomen nondistended, soft, nontender, no guarding or rebound tenderness  : no CVA tenderness, no suprapubic tenderness  NEURO: alert and oriented x 3, normal speech  MSK: no visible deformities, 3+ bilateral lower extremity pitting edema  SKIN: no visible rashes, dry, well-perfused

## 2023-03-17 NOTE — ED ADULT NURSE NOTE - NSFALLRSKINDICATORS_ED_ALL_ED
[FreeTextEntry1] : CPE OF 27 Y OLD FEM = LABS TODAY \par WT GAIN ,FREQUENT VAG CANDIDIASIS= HBA1C\par DIET AND EXERCISE RECOMM AND EXPLAINED\par RTO 1 Y OR PRN  no

## 2023-03-17 NOTE — ED ADULT NURSE NOTE - OBJECTIVE STATEMENT
Pt arrives to ED room 1, aaox4, breathing even and labored in bed. PMHx of DM, HTN, sleep apnea. Pt came to ED after seeing pulmonologist, pt sees MD for her sleep apnea and made appointment after experiencing SOB for a few weeks. Pt MD stated she could not figure out why the pt was SOB, pt was sent to cardiologist. Cardiologist stated the pt d dimer levels were elevated and to come to ED for r/o PE. Pt denies chest pain, dizziness, headache and weakness. Pt states she is unable to walk throughout her house, walking up stairs makes the pt extremely SOB. Pt not on home O2. #20G placed in left AC, labs drawn and sent, pt on tele showing NSR, bed in lowest position, call bell within reach, family at bedside.

## 2023-03-17 NOTE — ED ADULT NURSE NOTE - CHIEF COMPLAINT QUOTE
pt sent in by Cardiologist for elevated D dimer (8.54) and elevated creatinine (1.79). pt c/o cough and shortness of breath x 2 weeks, worsening. also reports swelling to b/l LE. no chest pain, palpitations. hx. HTN, HLD, DM

## 2023-03-17 NOTE — ED ADULT NURSE NOTE - ALCOHOL PRE SCREEN (AUDIT - C)
Detail Level: Detailed Quality 130: Documentation Of Current Medications In The Medical Record: Current Medications Documented Quality 110: Preventive Care And Screening: Influenza Immunization: Influenza immunization was not ordered or administered, reason not given Quality 431: Preventive Care And Screening: Unhealthy Alcohol Use - Screening: Unhealthy alcohol use screening not performed, reason not otherwise specified Quality 226: Preventive Care And Screening: Tobacco Use: Screening And Cessation Intervention: Patient screened for tobacco use and is an ex/non-smoker Statement Selected

## 2023-03-17 NOTE — ED PROVIDER NOTE - DIFFERENTIAL DIAGNOSIS
mor, reactive airway, pulmonary fibrosis, vol. overload, CHF, acute renal failure, pulmonary embolus, chronic thromboembolic pulmonary HTN, pna, viral illness, covid. Differential Diagnosis

## 2023-03-17 NOTE — ED ADULT NURSE NOTE - CAS DISCH BELONGINGS RETURNED
----- Message from Naty Hart sent at 2/12/2019 10:36 AM EST -----  Regarding: Dr Shruthi Palmer is calling regarding his Medications refills, couldn't get appt until April 2nd, 2019, need to get one more refill on his medications until his appt, please call pt at [phone]. Not applicable

## 2023-03-17 NOTE — ED PROVIDER NOTE - OBJECTIVE STATEMENT
77-year-old female with a past medical history of HTN, HLD, GERD, DM 2, DURAN, paroxysmal A-fib not on anticoagulation, status post bilateral knee replacements presents to the ED with worsening shortness of breath over the last 2 weeks.  Patient endorses worsening dyspnea on exertion.  Patient was noted to have an elevated D-dimer and sent to the ED for further evaluation.  She denies any chest pain.  She endorses orthopnea and lower extremity swelling.  No fevers, chills, nausea, vomiting, diarrhea no alleviating factors.  She attempted to take albuterol with minimal improvement.  She denies any focal neurologic deficits, numbness, or tingling.

## 2023-03-17 NOTE — ED PROVIDER NOTE - ATTENDING CONTRIBUTION TO CARE
Seen and examined, states worsening dyspnea x months. Pt. initially saw Pulm with no relief (takes inhaled steroids x yrs. but minimal/short lived relief adding albuterol) then had inc. leg edema and orthopnea, was sent for echo but told nl EF. This wk. notes desat to high 80s w exertion and takes longer to rest. Was ordered for stress test, duplex of legs and non-contrast CT chest but before they occurred was called over inc. Cr. and elev. dimer. No pleuritic pain, no fever/chills. States onset of sx was w/ cough in Dec. which never completely resolved. MMM, tachypneic, heart reg, abd soft, sl. distended, back no CVAT, no edema, NT calves, +2 edema, R sl. > L, good pulses. Seen and examined, states worsening dyspnea x months. Pt. initially saw Pulm with no relief (takes inhaled steroids x yrs. but minimal/short lived relief adding albuterol) then had inc. leg edema and orthopnea, was sent for echo but told nl EF. This wk. notes desat to high 80s w exertion and takes longer to rest. Was ordered for stress test, duplex of legs and non-contrast CT chest but before they occurred was called over inc. Cr. and elev. dimer. No pleuritic pain, no fever/chills. States onset of sx was w/ cough in Dec. which never completely resolved. MMM, tachypneic, heart reg, abd soft, sl. distended, back no CVAT, no edema, NT calves, +2 edema, R sl. > L, good pulses.    Gong: I have seen and examined the patient face to face, have reviewed and addended the HPI, PE and a/p as necessary.     Patient was critically ill with a high probability of imminent or life threatening deterioration.    I have performed direct patient care (not related to procedure), additional history taking, interpretation of diagnostic studies, documentation, consultation with other physicians,  consultation with the patient's family    Patient signed out to me from Dr. Nolan, pending CT angio.  Patient noted to have acute on chronic dyspnea on exertion with more recent lower extremity swelling.  Daughter reports echo this week with no emergent findings, and was noted to have pulmonologist prescribe 2 steroid doses within the past month.  Pt sent in for elevated creatinine and d-dimer as per daughter today.  On CT, patient was noted to have bilateral PE.  Pt has borderline low BP, 90s/50s.  Consulted CCU for possible intervention.  Started on IV heparin.      I have personally and independently provided the amount of critical care time documented below excluding time spent on separate procedures: 45 mins.

## 2023-03-17 NOTE — ED PROVIDER NOTE - CLINICAL SUMMARY MEDICAL DECISION MAKING FREE TEXT BOX
77-year-old female with a past medical history of HTN, HLD, GERD, DM 2, DURAN, paroxysmal A-fib not on anticoagulation, status post bilateral knee replacements presents to the ED with worsening shortness of breath over the last 2 weeks. Initial vitals notable for mildly increased respiratory rate, in the low 20s.  Rest of vitals otherwise nonactionable.  Physical exam as noted above.  Patient is speaking in short sentences and is visibly dyspneic.  There is 2+ bilateral lower extremity swelling.  Abdomen is obese, no tenderness to palpation.  No guarding or rebound tenderness.  Outpatient lab work notable for 1.7 creatinine with a GFR of 28, extensive discussion with family member.  Based on risk and benefits, patient and family would like to proceed with contrast study despite the risk of contrast-induced nephropathy given her worsening shortness of breath.  Differential diagnosis includes but not limited to stable angina versus ACS versus PE.  Will order labs, EKG, meds, imaging, and reassess.

## 2023-03-18 DIAGNOSIS — E78.5 HYPERLIPIDEMIA, UNSPECIFIED: ICD-10-CM

## 2023-03-18 DIAGNOSIS — N17.9 ACUTE KIDNEY FAILURE, UNSPECIFIED: ICD-10-CM

## 2023-03-18 DIAGNOSIS — E11.9 TYPE 2 DIABETES MELLITUS WITHOUT COMPLICATIONS: ICD-10-CM

## 2023-03-18 DIAGNOSIS — I48.20 CHRONIC ATRIAL FIBRILLATION, UNSPECIFIED: ICD-10-CM

## 2023-03-18 DIAGNOSIS — I48.0 PAROXYSMAL ATRIAL FIBRILLATION: ICD-10-CM

## 2023-03-18 DIAGNOSIS — I26.99 OTHER PULMONARY EMBOLISM WITHOUT ACUTE COR PULMONALE: ICD-10-CM

## 2023-03-18 DIAGNOSIS — R09.89 OTHER SPECIFIED SYMPTOMS AND SIGNS INVOLVING THE CIRCULATORY AND RESPIRATORY SYSTEMS: ICD-10-CM

## 2023-03-18 DIAGNOSIS — I10 ESSENTIAL (PRIMARY) HYPERTENSION: ICD-10-CM

## 2023-03-18 DIAGNOSIS — G47.30 SLEEP APNEA, UNSPECIFIED: ICD-10-CM

## 2023-03-18 LAB
A1C WITH ESTIMATED AVERAGE GLUCOSE RESULT: 6.1 % — HIGH (ref 4–5.6)
ANION GAP SERPL CALC-SCNC: 13 MMOL/L — SIGNIFICANT CHANGE UP (ref 7–14)
APTT BLD: 34.2 SEC — SIGNIFICANT CHANGE UP (ref 27–36.3)
APTT BLD: >200 SEC — CRITICAL HIGH (ref 27–36.3)
B PERT DNA SPEC QL NAA+PROBE: SIGNIFICANT CHANGE UP
B PERT+PARAPERT DNA PNL SPEC NAA+PROBE: SIGNIFICANT CHANGE UP
BORDETELLA PARAPERTUSSIS (RAPRVP): SIGNIFICANT CHANGE UP
BUN SERPL-MCNC: 36 MG/DL — HIGH (ref 7–23)
C PNEUM DNA SPEC QL NAA+PROBE: SIGNIFICANT CHANGE UP
CALCIUM SERPL-MCNC: 10.2 MG/DL — SIGNIFICANT CHANGE UP (ref 8.4–10.5)
CHLORIDE SERPL-SCNC: 111 MMOL/L — HIGH (ref 98–107)
CO2 SERPL-SCNC: 17 MMOL/L — LOW (ref 22–31)
CREAT SERPL-MCNC: 1.56 MG/DL — HIGH (ref 0.5–1.3)
EGFR: 34 ML/MIN/1.73M2 — LOW
ESTIMATED AVERAGE GLUCOSE: 128 — SIGNIFICANT CHANGE UP
FLUAV SUBTYP SPEC NAA+PROBE: SIGNIFICANT CHANGE UP
FLUBV RNA SPEC QL NAA+PROBE: SIGNIFICANT CHANGE UP
GLUCOSE BLDC GLUCOMTR-MCNC: 113 MG/DL — HIGH (ref 70–99)
GLUCOSE BLDC GLUCOMTR-MCNC: 115 MG/DL — HIGH (ref 70–99)
GLUCOSE BLDC GLUCOMTR-MCNC: 119 MG/DL — HIGH (ref 70–99)
GLUCOSE SERPL-MCNC: 123 MG/DL — HIGH (ref 70–99)
HADV DNA SPEC QL NAA+PROBE: SIGNIFICANT CHANGE UP
HCOV 229E RNA SPEC QL NAA+PROBE: SIGNIFICANT CHANGE UP
HCOV HKU1 RNA SPEC QL NAA+PROBE: SIGNIFICANT CHANGE UP
HCOV NL63 RNA SPEC QL NAA+PROBE: SIGNIFICANT CHANGE UP
HCOV OC43 RNA SPEC QL NAA+PROBE: SIGNIFICANT CHANGE UP
HCT VFR BLD CALC: 34.2 % — LOW (ref 34.5–45)
HGB BLD-MCNC: 10.3 G/DL — LOW (ref 11.5–15.5)
HMPV RNA SPEC QL NAA+PROBE: SIGNIFICANT CHANGE UP
HPIV1 RNA SPEC QL NAA+PROBE: SIGNIFICANT CHANGE UP
HPIV2 RNA SPEC QL NAA+PROBE: SIGNIFICANT CHANGE UP
HPIV3 RNA SPEC QL NAA+PROBE: SIGNIFICANT CHANGE UP
HPIV4 RNA SPEC QL NAA+PROBE: SIGNIFICANT CHANGE UP
M PNEUMO DNA SPEC QL NAA+PROBE: SIGNIFICANT CHANGE UP
MAGNESIUM SERPL-MCNC: 2.1 MG/DL — SIGNIFICANT CHANGE UP (ref 1.6–2.6)
MCHC RBC-ENTMCNC: 27.4 PG — SIGNIFICANT CHANGE UP (ref 27–34)
MCHC RBC-ENTMCNC: 30.1 GM/DL — LOW (ref 32–36)
MCV RBC AUTO: 91 FL — SIGNIFICANT CHANGE UP (ref 80–100)
NRBC # BLD: 0 /100 WBCS — SIGNIFICANT CHANGE UP (ref 0–0)
NRBC # FLD: 0.02 K/UL — HIGH (ref 0–0)
PHOSPHATE SERPL-MCNC: 3.6 MG/DL — SIGNIFICANT CHANGE UP (ref 2.5–4.5)
PLATELET # BLD AUTO: 212 K/UL — SIGNIFICANT CHANGE UP (ref 150–400)
POTASSIUM SERPL-MCNC: 4.8 MMOL/L — SIGNIFICANT CHANGE UP (ref 3.5–5.3)
POTASSIUM SERPL-SCNC: 4.8 MMOL/L — SIGNIFICANT CHANGE UP (ref 3.5–5.3)
RAPID RVP RESULT: SIGNIFICANT CHANGE UP
RBC # BLD: 3.76 M/UL — LOW (ref 3.8–5.2)
RBC # FLD: 17.1 % — HIGH (ref 10.3–14.5)
RSV RNA SPEC QL NAA+PROBE: SIGNIFICANT CHANGE UP
RV+EV RNA SPEC QL NAA+PROBE: SIGNIFICANT CHANGE UP
SARS-COV-2 RNA SPEC QL NAA+PROBE: SIGNIFICANT CHANGE UP
SODIUM SERPL-SCNC: 141 MMOL/L — SIGNIFICANT CHANGE UP (ref 135–145)
T3 SERPL-MCNC: 91 NG/DL — SIGNIFICANT CHANGE UP (ref 80–200)
T4 AB SER-ACNC: 4.92 UG/DL — LOW (ref 5.1–13)
TROPONIN T, HIGH SENSITIVITY RESULT: 35 NG/L — SIGNIFICANT CHANGE UP
WBC # BLD: 7.22 K/UL — SIGNIFICANT CHANGE UP (ref 3.8–10.5)
WBC # FLD AUTO: 7.22 K/UL — SIGNIFICANT CHANGE UP (ref 3.8–10.5)

## 2023-03-18 PROCEDURE — 99223 1ST HOSP IP/OBS HIGH 75: CPT

## 2023-03-18 PROCEDURE — 99223 1ST HOSP IP/OBS HIGH 75: CPT | Mod: FS

## 2023-03-18 PROCEDURE — 71275 CT ANGIOGRAPHY CHEST: CPT | Mod: 26,MA

## 2023-03-18 RX ORDER — FUROSEMIDE 40 MG
40 TABLET ORAL DAILY
Refills: 0 | Status: DISCONTINUED | OUTPATIENT
Start: 2023-03-18 | End: 2023-03-22

## 2023-03-18 RX ORDER — INSULIN LISPRO 100/ML
VIAL (ML) SUBCUTANEOUS AT BEDTIME
Refills: 0 | Status: DISCONTINUED | OUTPATIENT
Start: 2023-03-18 | End: 2023-03-28

## 2023-03-18 RX ORDER — COLESEVELAM HYDROCHLORIDE 625 MG/1
1 TABLET, FILM COATED ORAL
Qty: 0 | Refills: 0 | DISCHARGE

## 2023-03-18 RX ORDER — HEPARIN SODIUM 5000 [USP'U]/ML
10000 INJECTION INTRAVENOUS; SUBCUTANEOUS EVERY 6 HOURS
Refills: 0 | Status: DISCONTINUED | OUTPATIENT
Start: 2023-03-18 | End: 2023-03-21

## 2023-03-18 RX ORDER — FOLIC ACID 0.8 MG
1 TABLET ORAL DAILY
Refills: 0 | Status: DISCONTINUED | OUTPATIENT
Start: 2023-03-18 | End: 2023-03-28

## 2023-03-18 RX ORDER — DEXTROSE 50 % IN WATER 50 %
25 SYRINGE (ML) INTRAVENOUS ONCE
Refills: 0 | Status: DISCONTINUED | OUTPATIENT
Start: 2023-03-18 | End: 2023-03-28

## 2023-03-18 RX ORDER — DEXTROSE 50 % IN WATER 50 %
12.5 SYRINGE (ML) INTRAVENOUS ONCE
Refills: 0 | Status: DISCONTINUED | OUTPATIENT
Start: 2023-03-18 | End: 2023-03-28

## 2023-03-18 RX ORDER — GLUCAGON INJECTION, SOLUTION 0.5 MG/.1ML
1 INJECTION, SOLUTION SUBCUTANEOUS ONCE
Refills: 0 | Status: DISCONTINUED | OUTPATIENT
Start: 2023-03-18 | End: 2023-03-28

## 2023-03-18 RX ORDER — DILTIAZEM HCL 120 MG
180 CAPSULE, EXT RELEASE 24 HR ORAL DAILY
Refills: 0 | Status: DISCONTINUED | OUTPATIENT
Start: 2023-03-18 | End: 2023-03-18

## 2023-03-18 RX ORDER — SODIUM CHLORIDE 9 MG/ML
1000 INJECTION, SOLUTION INTRAVENOUS
Refills: 0 | Status: DISCONTINUED | OUTPATIENT
Start: 2023-03-18 | End: 2023-03-28

## 2023-03-18 RX ORDER — ACETAMINOPHEN 500 MG
1000 TABLET ORAL ONCE
Refills: 0 | Status: COMPLETED | OUTPATIENT
Start: 2023-03-18 | End: 2023-03-18

## 2023-03-18 RX ORDER — ACETAMINOPHEN 500 MG
650 TABLET ORAL EVERY 6 HOURS
Refills: 0 | Status: DISCONTINUED | OUTPATIENT
Start: 2023-03-18 | End: 2023-03-28

## 2023-03-18 RX ORDER — DEXTROSE 50 % IN WATER 50 %
15 SYRINGE (ML) INTRAVENOUS ONCE
Refills: 0 | Status: DISCONTINUED | OUTPATIENT
Start: 2023-03-18 | End: 2023-03-28

## 2023-03-18 RX ORDER — SPIRONOLACTONE 25 MG/1
25 TABLET, FILM COATED ORAL DAILY
Refills: 0 | Status: DISCONTINUED | OUTPATIENT
Start: 2023-03-18 | End: 2023-03-18

## 2023-03-18 RX ORDER — INSULIN LISPRO 100/ML
VIAL (ML) SUBCUTANEOUS
Refills: 0 | Status: DISCONTINUED | OUTPATIENT
Start: 2023-03-18 | End: 2023-03-28

## 2023-03-18 RX ORDER — HEPARIN SODIUM 5000 [USP'U]/ML
INJECTION INTRAVENOUS; SUBCUTANEOUS
Qty: 25000 | Refills: 0 | Status: DISCONTINUED | OUTPATIENT
Start: 2023-03-18 | End: 2023-03-21

## 2023-03-18 RX ORDER — HEPARIN SODIUM 5000 [USP'U]/ML
10000 INJECTION INTRAVENOUS; SUBCUTANEOUS ONCE
Refills: 0 | Status: COMPLETED | OUTPATIENT
Start: 2023-03-18 | End: 2023-03-18

## 2023-03-18 RX ORDER — HEPARIN SODIUM 5000 [USP'U]/ML
5000 INJECTION INTRAVENOUS; SUBCUTANEOUS EVERY 6 HOURS
Refills: 0 | Status: DISCONTINUED | OUTPATIENT
Start: 2023-03-18 | End: 2023-03-21

## 2023-03-18 RX ADMIN — Medication 650 MILLIGRAM(S): at 19:13

## 2023-03-18 RX ADMIN — HEPARIN SODIUM 2200 UNIT(S)/HR: 5000 INJECTION INTRAVENOUS; SUBCUTANEOUS at 03:44

## 2023-03-18 RX ADMIN — Medication 40 MILLIGRAM(S): at 17:38

## 2023-03-18 RX ADMIN — HEPARIN SODIUM 1800 UNIT(S)/HR: 5000 INJECTION INTRAVENOUS; SUBCUTANEOUS at 12:19

## 2023-03-18 RX ADMIN — HEPARIN SODIUM 2300 UNIT(S)/HR: 5000 INJECTION INTRAVENOUS; SUBCUTANEOUS at 19:40

## 2023-03-18 RX ADMIN — HEPARIN SODIUM 10000 UNIT(S): 5000 INJECTION INTRAVENOUS; SUBCUTANEOUS at 19:41

## 2023-03-18 RX ADMIN — HEPARIN SODIUM 0 UNIT(S)/HR: 5000 INJECTION INTRAVENOUS; SUBCUTANEOUS at 11:12

## 2023-03-18 RX ADMIN — Medication 1 MILLIGRAM(S): at 17:38

## 2023-03-18 RX ADMIN — HEPARIN SODIUM 10000 UNIT(S): 5000 INJECTION INTRAVENOUS; SUBCUTANEOUS at 03:43

## 2023-03-18 RX ADMIN — Medication 1000 MILLIGRAM(S): at 07:47

## 2023-03-18 NOTE — H&P ADULT - ASSESSMENT
77F PMH HTN, HLD, GERD, DM 2, DURAN, paroxysmal A-fib not on anticoagulation, remote history of bilateral knee replacements presents to the ED admitted with Bilateral Pulmonary Embolism with unknown provocation confirmed by CT Chest .

## 2023-03-18 NOTE — CONSULT NOTE ADULT - SUBJECTIVE AND OBJECTIVE BOX
HISTORY OF PRESENT ILLNESS:  This is a 77-year-old woman with a past medical history of HTN, HLD, GERD, DM 2, DURAN, paroxysmal A-fib not on anticoagulation, remote history of bilateral knee replacements presents to the ED with worsening shortness of breath over the last 2 weeks.  Patient endorses worsening dyspnea on exertion.  Patient was noted to have an elevated D-dimer and sent to the ED by Cardiologist Dr. Irizarry  for further evaluation.  CT Scan of Chest shows acute pulmonary emboli in the right interlobar artery and right upper, middle, and lower lobar branches; acute pulmonary emboli within the left upper and lower lobar branches and suggestion of right heart strain.  On interview, patient states she developed shortness of breath 2 weeks ago.  She saw her pulmonologist, Dr. Valenzuela, on 3/13/22 and was informed she did not have a pulmonary cause for her SOB.  She followed up with her cardiologist, Dr. Irizarry, today and D-Dimers came back high and was directed to [facility] ED.  She denies recent long distance travel, recent trauma or surgery, smoking history, use of hormone replacement medications or a personal history of cancer.  She does report breast cancer of sister and mother.  She reports having a mammogram this past August with normal findings.  She denies fever, chills, recent sick contact, chest pain, abdominal pain N/V/D.  She does state she had a stress test and echocardiogram with Dr. Irizarry 3 years ago that was normal.  On assessment, patient A+OX3 in no acute distress, SR 90s, /60, RR 18, SATing 100% on 4L NC, lung sounds diminished at bases, Temp 98.4F, no JVD.  Lab show elevated D-Dimer 2837, K+ 5.4 Creat 1.59, pBNP 3049.  CT Scan Chest shows acute pulmonary emboli in the right interlobar artery and right upper, middle, and lower lobar branches; cute pulmonary emboli within the left upper and lower lobar branches with suggestion of right heart strain; incidental finding of left lower lobe 4mm calcified granuloma.    Allergies    codeine (Other)  penicillin (Blisters)    Intolerances    	    MEDICATIONS:  heparin   Injectable 33059 Unit(s) IV Push every 6 hours PRN  heparin   Injectable 5000 Unit(s) IV Push every 6 hours PRN  heparin  Infusion.  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 No nausea, vomiting, or hematemesis; No diarrhea or constipation. No melena or hematochezia.  GENITOURINARY: No dysuria, frequency or hematuria  NEUROLOGICAL: No numbness or weakness  SKIN: No itching, burning, rashes, or lesions   All other review of systems is negative unless indicated above.  PHYSICAL EXAM:  T(C): 36.9 (23 @ 03:03), Max: 36.9 (23 @ 03:03)  HR: 92 (23 @ 03:03) (92 - 97)  BP: 98/59 (23 @ 03:03) (98/53 - 131/57)  RR: 18 (23 @ 03:03) (18 - 20)  SpO2: 100% (23 @ 03:03) (95% - 100%)  Wt(kg): --  I&O's Summary      Appearance: No Acute Distress	  HEENT:  Normal oral mucosa, PERRL, EOMI	  Cardiovascular: Normal S1 S2, No JVD, No murmurs/rubs/gallops  Respiratory: Lungs clear to auscultation bilaterally  Gastrointestinal:  Soft, Non-tender, + BS	  Skin: No rashes, No ecchymoses, No cyanosis	  Neurologic: Non-focal  Extremities: No clubbing, cyanosis or edema  Vascular: Peripheral pulses palpable 2+ bilaterally  Psychiatry: A & O x 3, Mood & affect appropriate    LABS:	 	    CBC Full  -  ( 17 Mar 2023 21:29 )  WBC Count : 8.59 K/uL  Hemoglobin : 10.7 g/dL  Hematocrit : 36.9 %  Platelet Count - Automated : 245 K/uL  Mean Cell Volume : 90.2 fL  Mean Cell Hemoglobin : 26.2 pg  Mean Cell Hemoglobin Concentration : 29.0 gm/dL  Auto Neutrophil # : 5.57 K/uL  Auto Lymphocyte # : 1.84 K/uL  Auto Monocyte # : 0.79 K/uL  Auto Eosinophil # : 0.17 K/uL  Auto Basophil # : 0.05 K/uL  Auto Neutrophil % : 64.8 %  Auto Lymphocyte % : 21.4 %  Auto Monocyte % : 9.2 %  Auto Eosinophil % : 2.0 %  Auto Basophil % : 0.6 %        139  |  109<H>  |  42<H>  ----------------------------<  106<H>  5.4<H>   |  15<L>  |  1.59<H>    Ca    10.4      17 Mar 2023 21:29  Mg     2.20         TPro  6.9  /  Alb  3.7  /  TBili  0.2  /  DBili  x   /  AST  25  /  ALT  15  /  AlkPhos  54        proBNP:   Lipid Profile:   HgA1c:   TSH: Thyroid Stimulating Hormone, Serum: 0.16 uIU/mL ( @ 21:29)        CARDIAC MARKERS:            TELEMETRY: 	  SR 90s  ECG:  	  RADIOLOGY:  < from: CT Angio Chest PE Protocol w/ IV Cont (23 @ 01:12) >  ACC: [id number] [facility]   ORDERED BY: YAYO MCLEAN     PROCEDURE DATE:  2023          INTERPRETATION:  CLINICAL INFORMATION: Chest pain. Evaluate for pulmonary   embolism.    COMPARISON: CTA chest 2020    CONTRAST/COMPLICATIONS:  IV Contrast: Omnipaque 350  70 cc administered   30 cc discarded  Oral Contrast: NONE  Complications: None reported at time of study completion    PROCEDURE:  CT Angiography of the Chest.  Sagittal and coronal reformats were performed as well as 3D (MIP)   reconstructions.    FINDINGS:    LUNGS AND AIRWAYS: Patent central airways.  Left lower lobe calcified   granuloma. A 4 mm nodule in the left lower lobe. Bibasilar subsegmental   atelectasis.  PLEURA: No pleural effusion.  MEDIASTINUM AND JAVIER: No lymphadenopathy and several calcified   mediastinal and hilar lymph nodes.  VESSELS: Acute pulmonary emboli in the right interlobar artery and right   upper, middle, and lower lobar branches. Acute pulmonary emboli within   the left upper and lower lobar branches.  HEART: Increased size of the right ventricle as compared to the left   ventricle, with leftward convexity of the interventricular septum. No   pericardial effusion.  CHEST WALL AND LOWER NECK: Redemonstrated nodular thyroid.  VISUALIZED UPPER ABDOMEN: Small hiatal hernia. Scattered splenic   granulomata. Left renal cyst.  BONES: Chronic fracture deformities of the left fourth through sixth   ribs. Degenerative changes.    IMPRESSION:  Acute bilateral lobar branch pulmonary emboli as described above, with   suggestion of right heart strain.    MD Martha was informed of these results by Roberto Pinon MD with   read back at about 2:18 AM on 3/18/2023    --- End of Report ---    < end of copied text >    OTHER: 	    PREVIOUS DIAGNOSTIC TESTING:    [ ] Echocardiogram:  [ ] Catheterization:  [ ] Stress Test:  	  	       HISTORY OF PRESENT ILLNESS:  This is a 77-year-old woman with a past medical history of HTN, HLD, GERD, DM 2, DURAN, paroxysmal A-fib(?) not on anticoagulation, remote history of bilateral knee replacements presents to the ED with worsening shortness of breath over the last 2 weeks.  Patient endorses worsening dyspnea on exertion.  Patient was noted to have an elevated D-dimer and sent to the ED by Cardiologist Dr. Irizarry  for further evaluation.  CT Scan of Chest shows acute pulmonary emboli in the right interlobar artery and right upper, middle, and lower lobar branches; acute pulmonary emboli within the left upper and lower lobar branches and suggestion of right heart strain.  On interview, patient states she developed shortness of breath 2 weeks ago.  She saw her pulmonologist, Dr. Vlaenzuela, on 3/13/22 and was informed she did not have a pulmonary cause for her SOB.  She followed up with her cardiologist, Dr. Irizarry, today and D-Dimers came back high and was directed to [facility] ED.  She denies recent long distance travel, recent trauma or surgery, smoking history, use of hormone replacement medications or a personal history of cancer.  She does report breast cancer of sister and mother.  She reports having a mammogram this past August with normal findings.  She denies fever, chills, recent sick contact, chest pain, abdominal pain N/V/D.  She does state she had a stress test and echocardiogram with Dr. Irizarry 3 years ago that was normal. She reports a remote history (10 yrs?) of Atrial Fibrillation but when stated on Cardizem she remained in SR was placed on Holter and her primary team did not continue anticoagulation. On assessment, patient A+OX3 in no acute distress, SR 90s, /60, RR 18, SATing 100% on 4L NC, lung sounds diminished at bases, Temp 98.4F, no JVD, bilateral pedal edema +1 with right leg calf pain.  Lab show elevated D-Dimer 2837, K+ 5.4 Creat 1.59, pBNP 3049.  CT Scan Chest shows acute pulmonary emboli in the right interlobar artery and right upper, middle, and lower lobar branches; cute pulmonary emboli within the left upper and lower lobar branches with suggestion of right heart strain; incidental finding of left lower lobe 4mm calcified granuloma.    Allergies    codeine (Other)  penicillin (Blisters)    Intolerances    HOME MEDICATIONS:  Diltiazem XL 180mg daily  Etodolac 400mg daily  Folic Acid 400mcg daily  Lasix 40mg daily  Metformin XL 500mg daily  Olmesartan 40mg daily  Potassium 20MEQ daily  Spironolactone 25mg daily	    MEDICATIONS:  heparin   Injectable 49957 Unit(s) IV Push every 6 hours PRN  heparin   Injectable 5000 Unit(s) IV Push every 6 hours PRN  heparin  Infusion.  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 No nausea, vomiting, or hematemesis; No diarrhea or constipation. No melena or hematochezia.  GENITOURINARY: No dysuria, frequency or hematuria  NEUROLOGICAL: No numbness or weakness  SKIN: No itching, burning, rashes, or lesions   All other review of systems is negative unless indicated above.  PHYSICAL EXAM:  T(C): 36.9 (23 @ 03:03), Max: 36.9 (23 @ 03:03)  HR: 92 (23 @ 03:03) (92 - 97)  BP: 98/59 (23 @ 03:03) (98/53 - 131/57)  RR: 18 (23 @ 03:03) (18 - 20)  SpO2: 100% (23 @ 03:03) (95% - 100%)  Wt(kg): --  I&O's Summary      Appearance: No Acute Distress	  HEENT:  Normal oral mucosa, PERRL, EOMI	  Cardiovascular: Normal S1 S2, No JVD, No murmurs/rubs/gallops  Respiratory: Lungs clear to auscultation bilaterally  Gastrointestinal:  Soft, Non-tender, + BS	  Skin: No rashes, No ecchymoses, No cyanosis	  Neurologic: Non-focal  Extremities: No clubbing, cyanosis or edema  Vascular: Peripheral pulses palpable 2+ bilaterally  Psychiatry: A & O x 3, Mood & affect appropriate    LABS:	 	    CBC Full  -  ( 17 Mar 2023 21:29 )  WBC Count : 8.59 K/uL  Hemoglobin : 10.7 g/dL  Hematocrit : 36.9 %  Platelet Count - Automated : 245 K/uL  Mean Cell Volume : 90.2 fL  Mean Cell Hemoglobin : 26.2 pg  Mean Cell Hemoglobin Concentration : 29.0 gm/dL  Auto Neutrophil # : 5.57 K/uL  Auto Lymphocyte # : 1.84 K/uL  Auto Monocyte # : 0.79 K/uL  Auto Eosinophil # : 0.17 K/uL  Auto Basophil # : 0.05 K/uL  Auto Neutrophil % : 64.8 %  Auto Lymphocyte % : 21.4 %  Auto Monocyte % : 9.2 %  Auto Eosinophil % : 2.0 %  Auto Basophil % : 0.6 %        139  |  109<H>  |  42<H>  ----------------------------<  106<H>  5.4<H>   |  15<L>  |  1.59<H>    Ca    10.4      17 Mar 2023 21:29  Mg     2.20         TPro  6.9  /  Alb  3.7  /  TBili  0.2  /  DBili  x   /  AST  25  /  ALT  15  /  AlkPhos  54        proBNP:   Lipid Profile:   HgA1c:   TSH: Thyroid Stimulating Hormone, Serum: 0.16 uIU/mL ( @ 21:29)        CARDIAC MARKERS:            TELEMETRY: 	  SR 90s  ECG:  	  RADIOLOGY:  < from: CT Angio Chest PE Protocol w/ IV Cont (23 @ 01:12) >  ACC: [id number] EXAM:  CT ANGIO CHEST PULM ART [facility]   ORDERED BY: YAYO MCLEAN     PROCEDURE DATE:  2023          INTERPRETATION:  CLINICAL INFORMATION: Chest pain. Evaluate for pulmonary   embolism.    COMPARISON: CTA chest 2020    CONTRAST/COMPLICATIONS:  IV Contrast: Omnipaque 350  70 cc administered   30 cc discarded  Oral Contrast: NONE  Complications: None reported at time of study completion    PROCEDURE:  CT Angiography of the Chest.  Sagittal and coronal reformats were performed as well as 3D (MIP)   reconstructions.    FINDINGS:    LUNGS AND AIRWAYS: Patent central airways.  Left lower lobe calcified   granuloma. A 4 mm nodule in the left lower lobe. Bibasilar subsegmental   atelectasis.  PLEURA: No pleural effusion.  MEDIASTINUM AND JAVIER: No lymphadenopathy and several calcified   mediastinal and hilar lymph nodes.  VESSELS: Acute pulmonary emboli in the right interlobar artery and right   upper, middle, and lower lobar branches. Acute pulmonary emboli within   the left upper and lower lobar branches.  HEART: Increased size of the right ventricle as compared to the left   ventricle, with leftward convexity of the interventricular septum. No   pericardial effusion.  CHEST WALL AND LOWER NECK: Redemonstrated nodular thyroid.  VISUALIZED UPPER ABDOMEN: Small hiatal hernia. Scattered splenic   granulomata. Left renal cyst.  BONES: Chronic fracture deformities of the left fourth through sixth   ribs. Degenerative changes.    IMPRESSION:  Acute bilateral lobar branch pulmonary emboli as described above, with   suggestion of right heart strain.    MD Martha was informed of these results by Roberto Pinon MD with   read back at about 2:18 AM on 3/18/2023    --- End of Report ---    < end of copied text >    OTHER: 	    PREVIOUS DIAGNOSTIC TESTING:    [ ] Echocardiogram:  [ ] Catheterization:  [ ] Stress Test:  	  	       HISTORY OF PRESENT ILLNESS:  This is a 77-year-old woman with a past medical history of HTN, HLD, GERD, DM 2, DURAN, paroxysmal A-fib(?) not on anticoagulation, remote history of bilateral knee replacements presents to the ED with worsening shortness of breath over the last 2 weeks.  Patient endorses worsening dyspnea on exertion.  Patient was noted to have an elevated D-dimer and sent to the ED by Cardiologist Dr. Irizarry  for further evaluation.  CT Scan of Chest shows acute pulmonary emboli in the right interlobar artery and right upper, middle, and lower lobar branches; acute pulmonary emboli within the left upper and lower lobar branches and suggestion of right heart strain.  On interview, patient states she developed shortness of breath 2 weeks ago.  She saw her pulmonologist, Dr. Valenzuela, on 3/13/22 and was informed she did not have a pulmonary cause for her SOB.  She followed up with her cardiologist, Dr. Irizarry, today and D-Dimers came back high and was directed to [facility] ED.  She denies recent long distance travel, recent trauma or surgery, smoking history, use of hormone replacement medications or a personal history of cancer.  She does report breast cancer of sister and mother.  She reports having a mammogram this past August with normal findings.  She denies fever, chills, recent sick contact, chest pain, abdominal pain N/V/D.  She does state she had a stress test and echocardiogram with Dr. Irizarry 3 years ago that was normal. She reports a remote history (10 yrs?) of Atrial Fibrillation but when stated on Cardizem she remained in SR was placed on Holter and her primary team did not continue anticoagulation. On assessment, patient A+OX3 in no acute distress, SR 90s, /60, RR 18, SATing 100% on 4L NC, lung sounds diminished at bases, Temp 98.4F, no JVD, bilateral pedal edema +1 with right leg calf pain.  Lab show elevated D-Dimer 2837, K+ 5.4 Creat 1.59, Troponins 35 ->35, pBNP 3049, Lactate 1.1.  CT Scan Chest shows acute pulmonary emboli in the right interlobar artery and right upper, middle, and lower lobar branches; cute pulmonary emboli within the left upper and lower lobar branches with suggestion of right heart strain; incidental finding of left lower lobe 4mm calcified granuloma.    Allergies    codeine (Other)  penicillin (Blisters)    Intolerances    HOME MEDICATIONS:  Diltiazem XL 180mg daily  Etodolac 400mg daily  Folic Acid 400mcg daily  Lasix 40mg daily  Metformin XL 500mg daily  Olmesartan 40mg daily  Potassium 20MEQ daily  Spironolactone 25mg daily	    MEDICATIONS:  heparin   Injectable 88463 Unit(s) IV Push every 6 hours PRN  heparin   Injectable 5000 Unit(s) IV Push every 6 hours PRN  heparin  Infusion.  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 No nausea, vomiting, or hematemesis; No diarrhea or constipation. No melena or hematochezia.  GENITOURINARY: No dysuria, frequency or hematuria  NEUROLOGICAL: No numbness or weakness  SKIN: No itching, burning, rashes, or lesions   All other review of systems is negative unless indicated above.  PHYSICAL EXAM:  T(C): 36.9 (23 @ 03:03), Max: 36.9 (23 @ 03:03)  HR: 92 (23 @ 03:03) (92 - 97)  BP: 98/59 (23 @ 03:03) (98/53 - 131/57)  RR: 18 (23 @ 03:03) (18 - 20)  SpO2: 100% (23 @ 03:03) (95% - 100%)  Wt(kg): --  I&O's Summary      Appearance: No Acute Distress	  HEENT:  Normal oral mucosa, PERRL, EOMI	  Cardiovascular: Normal S1 S2, No JVD, No murmurs/rubs/gallops  Respiratory: Lungs clear to auscultation bilaterally  Gastrointestinal:  Soft, Non-tender, + BS	  Skin: No rashes, No ecchymoses, No cyanosis	  Neurologic: Non-focal  Extremities: No clubbing, cyanosis or edema  Vascular: Peripheral pulses palpable 2+ bilaterally  Psychiatry: A & O x 3, Mood & affect appropriate    LABS:	 	    CBC Full  -  ( 17 Mar 2023 21:29 )  WBC Count : 8.59 K/uL  Hemoglobin : 10.7 g/dL  Hematocrit : 36.9 %  Platelet Count - Automated : 245 K/uL  Mean Cell Volume : 90.2 fL  Mean Cell Hemoglobin : 26.2 pg  Mean Cell Hemoglobin Concentration : 29.0 gm/dL  Auto Neutrophil # : 5.57 K/uL  Auto Lymphocyte # : 1.84 K/uL  Auto Monocyte # : 0.79 K/uL  Auto Eosinophil # : 0.17 K/uL  Auto Basophil # : 0.05 K/uL  Auto Neutrophil % : 64.8 %  Auto Lymphocyte % : 21.4 %  Auto Monocyte % : 9.2 %  Auto Eosinophil % : 2.0 %  Auto Basophil % : 0.6 %        139  |  109<H>  |  42<H>  ----------------------------<  106<H>  5.4<H>   |  15<L>  |  1.59<H>    Ca    10.4      17 Mar 2023 21:29  Mg     2.20         TPro  6.9  /  Alb  3.7  /  TBili  0.2  /  DBili  x   /  AST  25  /  ALT  15  /  AlkPhos  54        proBNP:   Lipid Profile:   HgA1c:   TSH: Thyroid Stimulating Hormone, Serum: 0.16 uIU/mL ( @ 21:29)        CARDIAC MARKERS:            TELEMETRY: 	  SR 90s  ECG:  	  RADIOLOGY:  < from: CT Angio Chest PE Protocol w/ IV Cont (23 @ 01:12) >  ACC: [id number] EXAM:  CT ANGIO CHEST PULM ART [facility]   ORDERED BY: YAYO MCLEAN     PROCEDURE DATE:  2023          INTERPRETATION:  CLINICAL INFORMATION: Chest pain. Evaluate for pulmonary   embolism.    COMPARISON: CTA chest 2020    CONTRAST/COMPLICATIONS:  IV Contrast: Omnipaque 350  70 cc administered   30 cc discarded  Oral Contrast: NONE  Complications: None reported at time of study completion    PROCEDURE:  CT Angiography of the Chest.  Sagittal and coronal reformats were performed as well as 3D (MIP)   reconstructions.    FINDINGS:    LUNGS AND AIRWAYS: Patent central airways.  Left lower lobe calcified   granuloma. A 4 mm nodule in the left lower lobe. Bibasilar subsegmental   atelectasis.  PLEURA: No pleural effusion.  MEDIASTINUM AND JAVIER: No lymphadenopathy and several calcified   mediastinal and hilar lymph nodes.  VESSELS: Acute pulmonary emboli in the right interlobar artery and right   upper, middle, and lower lobar branches. Acute pulmonary emboli within   the left upper and lower lobar branches.  HEART: Increased size of the right ventricle as compared to the left   ventricle, with leftward convexity of the interventricular septum. No   pericardial effusion.  CHEST WALL AND LOWER NECK: Redemonstrated nodular thyroid.  VISUALIZED UPPER ABDOMEN: Small hiatal hernia. Scattered splenic   granulomata. Left renal cyst.  BONES: Chronic fracture deformities of the left fourth through sixth   ribs. Degenerative changes.    IMPRESSION:  Acute bilateral lobar branch pulmonary emboli as described above, with   suggestion of right heart strain.    MD Martha was informed of these results by Roberto Pinon MD with   read back at about 2:18 AM on 3/18/2023    --- End of Report ---    < end of copied text >    OTHER: 	    PREVIOUS DIAGNOSTIC TESTING:    [ ] Echocardiogram:  [ ] Catheterization:  [ ] Stress Test:

## 2023-03-18 NOTE — H&P ADULT - NSHPADDITIONALINFOADULT_GEN_ALL_CORE
discussed pts current clinical status , management plan in detail in length with pt   discussed management plan with acp covering pt  -Ethylene

## 2023-03-18 NOTE — H&P ADULT - PROBLEM SELECTOR PLAN 5
tele   as per cards - hold diltiazem for now- will dc and consider restart  depending upon pts rate / rhythm

## 2023-03-18 NOTE — PROGRESS NOTE ADULT - SUBJECTIVE AND OBJECTIVE BOX
PULMONARY PROGRESS NOTE    TORI WOOTEN  MRN-[id number]    Patient is a 77y old  Female who presents with a chief complaint of sob (18 Mar 2023 12:40)      HPI:  -found to have PE and right heart strain  -known to   -says she is tired    ROS:   -    ACTIVE MEDICATION LIST:  MEDICATIONS  (STANDING):  dextrose 5%. 1000 milliLiter(s) (50 mL/Hr) IV Continuous <Continuous>  dextrose 5%. 1000 milliLiter(s) (100 mL/Hr) IV Continuous <Continuous>  dextrose 50% Injectable 25 Gram(s) IV Push once  dextrose 50% Injectable 12.5 Gram(s) IV Push once  dextrose 50% Injectable 25 Gram(s) IV Push once  folic acid 1 milliGRAM(s) Oral daily  furosemide    Tablet 40 milliGRAM(s) Oral daily  glucagon  Injectable 1 milliGRAM(s) IntraMuscular once  heparin  Infusion.  Unit(s)/Hr (22 mL/Hr) IV Continuous <Continuous>  insulin lispro (ADMELOG) corrective regimen sliding scale   SubCutaneous three times a day before meals  insulin lispro (ADMELOG) corrective regimen sliding scale   SubCutaneous at bedtime    MEDICATIONS  (PRN):  dextrose Oral Gel 15 Gram(s) Oral once PRN Blood Glucose LESS THAN 70 milliGRAM(s)/deciliter  heparin   Injectable 41867 Unit(s) IV Push every 6 hours PRN For aPTT less than 40  heparin   Injectable 5000 Unit(s) IV Push every 6 hours PRN For aPTT between 40 - 57      EXAM:  Vital Signs Last 24 Hrs  T(C): 36.7 (18 Mar 2023 12:41), Max: 36.9 (18 Mar 2023 03:03)  T(F): 98 (18 Mar 2023 12:41), Max: 98.4 (18 Mar 2023 03:03)  HR: 95 (18 Mar 2023 12:41) (90 - 97)  BP: 128/51 (18 Mar 2023 12:41) (98/53 - 131/57)  BP(mean): --  RR: 18 (18 Mar 2023 12:41) (18 - 20)  SpO2: 99% (18 Mar 2023 12:41) (95% - 100%)    Parameters below as of 18 Mar 2023 12:41  Patient On (Oxygen Delivery Method): nasal cannula  O2 Flow (L/min): 2      GENERAL: The patient is awake and alert in no apparent distress.     LUNGS: Clear to auscultation without wheezing, rales or rhonchi; respirations unlabored        LABS/IMAGING: reviewed                        10.3   7.22  )-----------( 212      ( 18 Mar 2023 09:30 )             34.2     03-18    141  |  111<H>  |  36<H>  ----------------------------<  123<H>  4.8   |  17<L>  |  1.56<H>    Ca    10.2      18 Mar 2023 09:30  Phos  3.6     03-18  Mg     2.10     03-18    TPro  6.9  /  Alb  3.7  /  TBili  0.2  /  DBili  x   /  AST  25  /  ALT  15  /  AlkPhos  54  03-17  < from: CT Angio Chest PE Protocol w/ IV Cont (03.18.23 @ 01:12) >    ACC: [id number] EXAM:  CT ANGIO CHEST PULM ART [facility]   ORDERED BY: YAYO MCLEAN     PROCEDURE DATE:  03/18/2023          INTERPRETATION:  CLINICAL INFORMATION: Chest pain. Evaluate for pulmonary   embolism.    COMPARISON: CTA chest 8/8/2020    CONTRAST/COMPLICATIONS:  IV Contrast: Omnipaque 350  70 cc administered   30 cc discarded  Oral Contrast: NONE  Complications: None reported at time of study completion    PROCEDURE:  CT Angiography of the Chest.  Sagittal and coronal reformats were performed as well as 3D (MIP)   reconstructions.    FINDINGS:    LUNGS AND AIRWAYS: Patent central airways.  Left lower lobe calcified   granuloma. A 4 mm nodule in the left lower lobe. Bibasilar subsegmental   atelectasis.  PLEURA: No pleural effusion.  MEDIASTINUM AND JAVIER: No lymphadenopathy and several calcified   mediastinal and hilar lymph nodes.  VESSELS: Acute pulmonary emboli in the right interlobar artery and right   upper, middle, and lower lobar branches. Acute pulmonary emboli within   the left upper and lower lobar branches.  HEART: Increased size of the right ventricle as compared to the left   ventricle, with leftward convexity of the interventricular septum. No   pericardial effusion.  CHEST WALL AND LOWER NECK: Redemonstrated nodular thyroid.  VISUALIZED UPPER ABDOMEN: Small hiatal hernia. Scattered splenic   granulomata. Left renal cyst.  BONES: Chronic fracture deformities of the left fourth through sixth   ribs. Degenerative changes.    IMPRESSION:  Acute bilateral lobar branch pulmonary emboli as described above, with   suggestion of right heart strain.    MD Martha was informed of these results by Melinda Pinon MD with   read back at about 2:18 AM on 3/18/2023    --- End of Report ---          MELINDA PINON MD; Resident Radiologist  This document has been electronically signed.  ALYSON LEE MD; Attending Radiologist  This document has been electronically signed. Mar 18 2023  3:44AM    < end of copied text >      PROBLEM LIST:  77y Female with HEALTH ISSUES - PROBLEM Dx:  Acute pulmonary embolism    Sleep apnea    Diabetes mellitus    Diabetes mellitus    Hypertension    Hyperlipidemia    Chronic atrial fibrillation    PAF (paroxysmal atrial fibrillation)    JACKLYN (acute kidney injury)    Suspected deep vein thrombosis (DVT)    Pulmonary thromboembolism      RECS:    -full ac for PE  -echo, dopplers  -Wean O2 as tolerated  -incentive divya    Gilma Luque MD   [phone]

## 2023-03-18 NOTE — ED ADULT NURSE REASSESSMENT NOTE - NS ED NURSE REASSESS COMMENT FT1
Pt at baseline mental status breathing even and unlabored in bed. No complaints of chest pain, headache, nausea, dizziness, vomiting, fever, chills. Pt c/o SOB, on 3 L NC sating at 100%. Pt experiencing worsening SOB with movement. Bed in lowest position, call bell within reach, family at bedside.
Received pt at change of shift, PT is resting in stretcher, easily arousable to verbal stimuli, A+Ox4. pt arrived for dyspnia on exertion, found to have bilateral PE. Respirations even and unlabored, normal work of breathing, no accessory muscle use, speaking in full clear uninterrupted sentences. Pt denies any chest pain, SOB, headache, dizziness, N/V/D, fever, chills. pt endorsing pain in the left shoulder and ankle 20G to LAC, no redness or swelling noted. Heparin infusing as per Full AC nomogram. will continue to monitor.
Pt at baseline mental status, breathing even and unlabored in bed. No complaints of chest pain, headache, nausea, dizziness, vomiting, SOB, fever, chills verbalized. 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 Bed in lowest position, call bell within reach, family at bedside.

## 2023-03-18 NOTE — H&P ADULT - PROBLEM SELECTOR PLAN 1
sec to evidence of rv strain on CT , CCU was consulted - no ccu care at present   cont heparin   doppler lower ext   echo   ? triggered by PAF   risk and benefits of ac informed

## 2023-03-18 NOTE — CONSULT NOTE ADULT - PROBLEM SELECTOR RECOMMENDATION 9
PESI Score 89 Intermediate   Hemodynamically stable, no clinical sign of right heart strain  Admit to Telemetry  May continue Heparin gtt full anticoagulation  Consider DOAC when appropriate  Daily CBC, BMP, Mag, Phos, PT/INR/PTT  Echocardiogram in am to evaluate right heart strain  Cardiology to follow PESI Score 89 Intermediate   Hemodynamically stable, no clinical sign of right heart strain  Admit to Telemetry  May continue Heparin gtt full anticoagulation  Consider DOAC when appropriate  Daily CBC, BMP, Mag, Phos, PT/INR/PTT  Echocardiogram in am to evaluate right heart strain  Doppler studies to bilateral ower extremities  Hold Diltiazem for now  Hold Olmesartan and Spironolactone for elevated creatinine  May continue Lasix 40mg daily  Cardiology to follow PESI Score 89 Intermediate   Hemodynamically stable, no clinical sign of right heart strain  Troponins 35 ->35, pBNP 3049, Lactate 1.1.    Admit to Telemetry  May continue Heparin gtt full anticoagulation  Consider DOAC when appropriate  Daily CBC, BMP, Mag, Phos, PT/INR/PTT  Echocardiogram in am to evaluate right heart strain  Doppler studies to bilateral ower extremities  Hold Diltiazem for now  Hold Olmesartan and Spironolactone for elevated creatinine  May continue Lasix 40mg daily  Cardiology to follow

## 2023-03-18 NOTE — PATIENT PROFILE ADULT - FALL HARM RISK - HARM RISK INTERVENTIONS

## 2023-03-19 LAB
ANION GAP SERPL CALC-SCNC: 13 MMOL/L — SIGNIFICANT CHANGE UP (ref 7–14)
ANION GAP SERPL CALC-SCNC: 14 MMOL/L — SIGNIFICANT CHANGE UP (ref 7–14)
APTT BLD: 34.5 SEC — SIGNIFICANT CHANGE UP (ref 27–36.3)
APTT BLD: 40.6 SEC — HIGH (ref 27–36.3)
APTT BLD: 55.5 SEC — HIGH (ref 27–36.3)
BLD GP AB SCN SERPL QL: NEGATIVE — SIGNIFICANT CHANGE UP
BUN SERPL-MCNC: 31 MG/DL — HIGH (ref 7–23)
BUN SERPL-MCNC: 37 MG/DL — HIGH (ref 7–23)
CALCIUM SERPL-MCNC: 10.2 MG/DL — SIGNIFICANT CHANGE UP (ref 8.4–10.5)
CALCIUM SERPL-MCNC: 10.6 MG/DL — HIGH (ref 8.4–10.5)
CHLORIDE SERPL-SCNC: 105 MMOL/L — SIGNIFICANT CHANGE UP (ref 98–107)
CHLORIDE SERPL-SCNC: 107 MMOL/L — SIGNIFICANT CHANGE UP (ref 98–107)
CHOLEST SERPL-MCNC: 201 MG/DL — HIGH
CO2 SERPL-SCNC: 16 MMOL/L — LOW (ref 22–31)
CO2 SERPL-SCNC: 19 MMOL/L — LOW (ref 22–31)
CREAT SERPL-MCNC: 1.54 MG/DL — HIGH (ref 0.5–1.3)
CREAT SERPL-MCNC: 1.58 MG/DL — HIGH (ref 0.5–1.3)
EGFR: 33 ML/MIN/1.73M2 — LOW
EGFR: 34 ML/MIN/1.73M2 — LOW
GLUCOSE BLDC GLUCOMTR-MCNC: 101 MG/DL — HIGH (ref 70–99)
GLUCOSE BLDC GLUCOMTR-MCNC: 104 MG/DL — HIGH (ref 70–99)
GLUCOSE BLDC GLUCOMTR-MCNC: 111 MG/DL — HIGH (ref 70–99)
GLUCOSE BLDC GLUCOMTR-MCNC: 119 MG/DL — HIGH (ref 70–99)
GLUCOSE BLDC GLUCOMTR-MCNC: 134 MG/DL — HIGH (ref 70–99)
GLUCOSE SERPL-MCNC: 146 MG/DL — HIGH (ref 70–99)
GLUCOSE SERPL-MCNC: 99 MG/DL — SIGNIFICANT CHANGE UP (ref 70–99)
HCT VFR BLD CALC: 37.8 % — SIGNIFICANT CHANGE UP (ref 34.5–45)
HDLC SERPL-MCNC: 57 MG/DL — SIGNIFICANT CHANGE UP
HGB BLD-MCNC: 10.9 G/DL — LOW (ref 11.5–15.5)
LIPID PNL WITH DIRECT LDL SERPL: 94 MG/DL — SIGNIFICANT CHANGE UP
MAGNESIUM SERPL-MCNC: 2 MG/DL — SIGNIFICANT CHANGE UP (ref 1.6–2.6)
MAGNESIUM SERPL-MCNC: 2.1 MG/DL — SIGNIFICANT CHANGE UP (ref 1.6–2.6)
MCHC RBC-ENTMCNC: 26.3 PG — LOW (ref 27–34)
MCHC RBC-ENTMCNC: 28.8 GM/DL — LOW (ref 32–36)
MCV RBC AUTO: 91.1 FL — SIGNIFICANT CHANGE UP (ref 80–100)
NON HDL CHOLESTEROL: 144 MG/DL — HIGH
NRBC # BLD: 0 /100 WBCS — SIGNIFICANT CHANGE UP (ref 0–0)
NRBC # FLD: 0.02 K/UL — HIGH (ref 0–0)
NT-PROBNP SERPL-SCNC: 1368 PG/ML — HIGH
PHOSPHATE SERPL-MCNC: 3.1 MG/DL — SIGNIFICANT CHANGE UP (ref 2.5–4.5)
PHOSPHATE SERPL-MCNC: 3.4 MG/DL — SIGNIFICANT CHANGE UP (ref 2.5–4.5)
PLATELET # BLD AUTO: 251 K/UL — SIGNIFICANT CHANGE UP (ref 150–400)
POTASSIUM SERPL-MCNC: 4.6 MMOL/L — SIGNIFICANT CHANGE UP (ref 3.5–5.3)
POTASSIUM SERPL-MCNC: 4.8 MMOL/L — SIGNIFICANT CHANGE UP (ref 3.5–5.3)
POTASSIUM SERPL-SCNC: 4.6 MMOL/L — SIGNIFICANT CHANGE UP (ref 3.5–5.3)
POTASSIUM SERPL-SCNC: 4.8 MMOL/L — SIGNIFICANT CHANGE UP (ref 3.5–5.3)
RBC # BLD: 4.15 M/UL — SIGNIFICANT CHANGE UP (ref 3.8–5.2)
RBC # FLD: 16.6 % — HIGH (ref 10.3–14.5)
RH IG SCN BLD-IMP: POSITIVE — SIGNIFICANT CHANGE UP
SODIUM SERPL-SCNC: 137 MMOL/L — SIGNIFICANT CHANGE UP (ref 135–145)
SODIUM SERPL-SCNC: 137 MMOL/L — SIGNIFICANT CHANGE UP (ref 135–145)
TRIGL SERPL-MCNC: 249 MG/DL — HIGH
TROPONIN T, HIGH SENSITIVITY RESULT: 27 NG/L — SIGNIFICANT CHANGE UP
TSH SERPL-MCNC: 0.12 UIU/ML — LOW (ref 0.27–4.2)
WBC # BLD: 7.66 K/UL — SIGNIFICANT CHANGE UP (ref 3.8–10.5)
WBC # FLD AUTO: 7.66 K/UL — SIGNIFICANT CHANGE UP (ref 3.8–10.5)

## 2023-03-19 PROCEDURE — 93970 EXTREMITY STUDY: CPT | Mod: 26

## 2023-03-19 RX ADMIN — Medication 650 MILLIGRAM(S): at 20:50

## 2023-03-19 RX ADMIN — HEPARIN SODIUM 3400 UNIT(S)/HR: 5000 INJECTION INTRAVENOUS; SUBCUTANEOUS at 17:05

## 2023-03-19 RX ADMIN — HEPARIN SODIUM 5000 UNIT(S): 5000 INJECTION INTRAVENOUS; SUBCUTANEOUS at 17:11

## 2023-03-19 RX ADMIN — HEPARIN SODIUM 3400 UNIT(S)/HR: 5000 INJECTION INTRAVENOUS; SUBCUTANEOUS at 19:49

## 2023-03-19 RX ADMIN — HEPARIN SODIUM 3100 UNIT(S)/HR: 5000 INJECTION INTRAVENOUS; SUBCUTANEOUS at 09:38

## 2023-03-19 RX ADMIN — Medication 1 MILLIGRAM(S): at 08:46

## 2023-03-19 RX ADMIN — HEPARIN SODIUM 2800 UNIT(S)/HR: 5000 INJECTION INTRAVENOUS; SUBCUTANEOUS at 05:52

## 2023-03-19 RX ADMIN — Medication 40 MILLIGRAM(S): at 08:46

## 2023-03-19 RX ADMIN — Medication 650 MILLIGRAM(S): at 19:50

## 2023-03-19 RX ADMIN — HEPARIN SODIUM 10000 UNIT(S): 5000 INJECTION INTRAVENOUS; SUBCUTANEOUS at 03:11

## 2023-03-19 RX ADMIN — HEPARIN SODIUM 3400 UNIT(S)/HR: 5000 INJECTION INTRAVENOUS; SUBCUTANEOUS at 22:44

## 2023-03-19 RX ADMIN — HEPARIN SODIUM 2800 UNIT(S)/HR: 5000 INJECTION INTRAVENOUS; SUBCUTANEOUS at 03:06

## 2023-03-19 NOTE — PROGRESS NOTE ADULT - SUBJECTIVE AND OBJECTIVE BOX
CARDIOLOGY FELLOW PROGRESS NOTE    Subjective:  No acute events overnight.   Dyspnea at rest and w/ ambulation  Other ROS negative.    Current Medications:   acetaminophen     Tablet .. 650 milliGRAM(s) Oral every 6 hours PRN  dextrose 5%. 1000 milliLiter(s) IV Continuous <Continuous>  dextrose 5%. 1000 milliLiter(s) IV Continuous <Continuous>  dextrose 50% Injectable 25 Gram(s) IV Push once  dextrose 50% Injectable 12.5 Gram(s) IV Push once  dextrose 50% Injectable 25 Gram(s) IV Push once  dextrose Oral Gel 15 Gram(s) Oral once PRN  folic acid 1 milliGRAM(s) Oral daily  furosemide    Tablet 40 milliGRAM(s) Oral daily  glucagon  Injectable 1 milliGRAM(s) IntraMuscular once  heparin   Injectable 66084 Unit(s) IV Push every 6 hours PRN  heparin   Injectable 5000 Unit(s) IV Push every 6 hours PRN  heparin  Infusion.  Unit(s)/Hr IV Continuous <Continuous>  insulin lispro (ADMELOG) corrective regimen sliding scale   SubCutaneous three times a day before meals  insulin lispro (ADMELOG) corrective regimen sliding scale   SubCutaneous at bedtime    REVIEW OF SYSTEMS:  CONSTITUTIONAL: No weakness, fevers or chills  EYES/ENT: No visual changes;  No dysphagia  NECK: No pain or stiffness  RESPIRATORY: No cough, wheezing, hemoptysis; shortness of breath  CARDIOVASCULAR: No chest pain or palpitations; No lower extremity edema  GASTROINTESTINAL: No abdominal or epigastric pain. No nausea, vomiting, or hematemesis; No diarrhea or constipation. No melena or hematochezia.  BACK: No back pain  GENITOURINARY: No dysuria, frequency or hematuria  NEUROLOGICAL: No numbness or weakness  SKIN: No itching, burning, rashes, or lesions   All other review of systems is negative unless indicated above.    Physical Exam:  T(F): 98.7 (-), Max: 98.7 (-)  HR: 108 (-) (95 - 117)  BP: 116/66 (-) (114/68 - 136/79)  BP(mean): --  ABP: --  ABP(mean): --  RR: 20 (-19)  SpO2: 98% (-19)  GENERAL: No acute distress, well-developed. On supplemental O2 via NC. Dyspneic at rest.   HEAD:  Atraumatic, Normocephalic  ENT: EOMI, PERRLA, conjunctiva and sclera clear, Neck supple, No JVD, moist mucosa  CHEST/LUNG: Clear to auscultation bilaterally; No wheeze, equal breath sounds bilaterally   BACK: No spinal tenderness  HEART: Regular rate and rhythm; No murmurs, rubs, or gallops  ABDOMEN: Soft, Nontender, Nondistended; Bowel sounds present  EXTREMITIES:  No clubbing, cyanosis, or edema  PSYCH: Nl behavior, nl affect  NEUROLOGY: AAOx3, non-focal, cranial nerves intact  SKIN: Normal color, No rashes or lesions    Cardiovascular Diagnostic Testing: personally reviewed    CXR: Personally reviewed    Labs: Personally reviewed                        10.3   7.22  )-----------( 212      ( 18 Mar 2023 09:30 )             34.2         137  |  107  |  37<H>  ----------------------------<  146<H>  4.8   |  16<L>  |  1.58<H>    Ca    10.2      19 Mar 2023 03:00  Phos  3.4       Mg     2.10         TPro  6.9  /  Alb  3.7  /  TBili  0.2  /  DBili  x   /  AST  25  /  ALT  15  /  AlkPhos  54  -17    PTT - ( 19 Mar 2023 08:54 )  PTT:40.6 sec    CARDIAC MARKERS ( 18 Mar 2023 01:02 )  35 ng/L / x     / x     / x     / x     / x      CARDIAC MARKERS ( 17 Mar 2023 21:29 )  35 ng/L / x     / x     / x     / x     / x              Total Cholesterol: 201  LDL: --  HDL: 57  T      Thyroid Stimulating Hormone, Serum: 0.12 uIU/mL ( @ 03:00)  Thyroid Stimulating Hormone, Serum: 0.16 uIU/mL ( @ 21:29)

## 2023-03-19 NOTE — PROGRESS NOTE ADULT - ASSESSMENT
78 yo lady PMHx of paroxysmal Afib noton AC, remote hx of b/l knee replacement, and HTN who is admitted for b/l lobar branch PE, with evidence of R heart strain on CT angio    #Submassive PE - intermediate-high risk  - Elevated proBNP and evidence of RV strain on CT  - Please obtain LE venous duplex bilateral and full TTE today  - C/w heparin gtt  - Please repeat proBNP and hsTrop, obtain lactic acid level  - Please record vital signs with ambulation  - If w/o improvement on heparin gtt, may need mechanical thrombectomy   - Close monitoring of hemodynamics, immediately notify PERT w/ any changes in hemodynamics  - High mortality risk patient, will closely follow  - Age appropriate malignancy screening warranted    Thank you    Alivia Levy M.D.  PGY6 Cardiology Fellow         76 yo lady PMHx of paroxysmal Afib noton AC, remote hx of b/l knee replacement, and HTN who is admitted for b/l lobar branch PE, with evidence of R heart strain on CT angio    #Submassive PE - intermediate-high risk  - Elevated proBNP and evidence of RV strain on CT  - Please obtain LE venous duplex bilateral and full TTE today  - Please repeat proBNP and hsTrop, obtain lactic acid level  - Please record vital signs with ambulation  - If w/o improvement on heparin gtt, may need mechanical thrombectomy   - Close monitoring of hemodynamics, immediately notify PERT w/ any changes in hemodynamics  - High mortality risk patient, will closely follow  - Age appropriate malignancy screening warranted    Please change to low molecular weight heparin (full dose) as there are no immediate plans for catheter based procedure.    Thank you    Alivia Levy M.D.  PGY6 Cardiology Fellow

## 2023-03-19 NOTE — CHART NOTE - NSCHARTNOTEFT_GEN_A_CORE
Spoke to cardiology echo expedited, recommendations ordered Spoke to cardiology echo expedited, recommendations ordered and communicated to RN

## 2023-03-19 NOTE — PROGRESS NOTE ADULT - SUBJECTIVE AND OBJECTIVE BOX
SUBJECTIVE / OVERNIGHT EVENTS:pt seen and examined  03-19-23     MEDICATIONS  (STANDING):  dextrose 5%. 1000 milliLiter(s) (100 mL/Hr) IV Continuous <Continuous>  dextrose 5%. 1000 milliLiter(s) (50 mL/Hr) IV Continuous <Continuous>  dextrose 50% Injectable 25 Gram(s) IV Push once  dextrose 50% Injectable 12.5 Gram(s) IV Push once  dextrose 50% Injectable 25 Gram(s) IV Push once  folic acid 1 milliGRAM(s) Oral daily  furosemide    Tablet 40 milliGRAM(s) Oral daily  glucagon  Injectable 1 milliGRAM(s) IntraMuscular once  heparin  Infusion.  Unit(s)/Hr (22 mL/Hr) IV Continuous <Continuous>  insulin lispro (ADMELOG) corrective regimen sliding scale   SubCutaneous three times a day before meals  insulin lispro (ADMELOG) corrective regimen sliding scale   SubCutaneous at bedtime    MEDICATIONS  (PRN):  acetaminophen     Tablet .. 650 milliGRAM(s) Oral every 6 hours PRN Temp greater or equal to 38C (100.4F), Mild Pain (1 - 3)  dextrose Oral Gel 15 Gram(s) Oral once PRN Blood Glucose LESS THAN 70 milliGRAM(s)/deciliter  heparin   Injectable 65957 Unit(s) IV Push every 6 hours PRN For aPTT less than 40  heparin   Injectable 5000 Unit(s) IV Push every 6 hours PRN For aPTT between 40 - 57    T(C): 36.4 (03-19-23 @ 13:00), Max: 37.1 (03-19-23 @ 01:52)  HR: 99 (03-19-23 @ 13:00) (99 - 130)  BP: 128/72 (03-19-23 @ 13:00) (114/68 - 137/73)  RR: 18 (03-19-23 @ 13:00) (18 - 20)  SpO2: 100% (03-19-23 @ 13:00) (97% - 100%)    CAPILLARY BLOOD GLUCOSE      POCT Blood Glucose.: 134 mg/dL (19 Mar 2023 21:43)  POCT Blood Glucose.: 119 mg/dL (19 Mar 2023 18:04)  POCT Blood Glucose.: 101 mg/dL (19 Mar 2023 12:49)  POCT Blood Glucose.: 104 mg/dL (19 Mar 2023 12:07)  POCT Blood Glucose.: 111 mg/dL (19 Mar 2023 09:00)  POCT Blood Glucose.: 113 mg/dL (18 Mar 2023 22:13)    I&O's Summary      Constitutional: No fever, fatigue  Skin: No rash.  Eyes: No recent vision problems or eye pain.  ENT: No congestion, ear pain, or sore throat.  Cardiovascular: No chest pain or palpation.  Respiratory: No cough, shortness of breath, congestion, or wheezing.  Gastrointestinal: No abdominal pain, nausea, vomiting, or diarrhea.  Genitourinary: No dysuria.  Musculoskeletal: No joint swelling.  Neurologic: No headache.    PHYSICAL EXAM:  GENERAL: NAD  EYES: EOMI, PERRLA  NECK: Supple, No JVD  CHEST/LUNG: dec breath sounds at bases  HEART:  S1 , S2 +  ABDOMEN: soft, bs+  EXTREMITIES:  edema+  NEUROLOGY:alert awake orineted       LABS:                        10.9   7.66  )-----------( 251      ( 19 Mar 2023 12:11 )             37.8     03-19    137  |  105  |  31<H>  ----------------------------<  99  4.6   |  19<L>  |  1.54<H>    Ca    10.6<H>      19 Mar 2023 12:11  Phos  3.1     03-19  Mg     2.00     03-19      PTT - ( 19 Mar 2023 15:20 )  PTT:55.5 sec          RADIOLOGY & ADDITIONAL TESTS:    Imaging Personally Reviewed:yes    Consultant(s) Notes Reviewed:  yes    Care Discussed with Consultants/Other Providers:yes

## 2023-03-20 LAB
ANION GAP SERPL CALC-SCNC: 11 MMOL/L — SIGNIFICANT CHANGE UP (ref 7–14)
ANION GAP SERPL CALC-SCNC: 11 MMOL/L — SIGNIFICANT CHANGE UP (ref 7–14)
APTT BLD: 159.8 SEC — CRITICAL HIGH (ref 27–36.3)
APTT BLD: 190.3 SEC — CRITICAL HIGH (ref 27–36.3)
APTT BLD: >200 SEC — CRITICAL HIGH (ref 27–36.3)
BUN SERPL-MCNC: 32 MG/DL — HIGH (ref 7–23)
BUN SERPL-MCNC: 33 MG/DL — HIGH (ref 7–23)
CALCIUM SERPL-MCNC: 10.1 MG/DL — SIGNIFICANT CHANGE UP (ref 8.4–10.5)
CALCIUM SERPL-MCNC: 10.4 MG/DL — SIGNIFICANT CHANGE UP (ref 8.4–10.5)
CHLORIDE SERPL-SCNC: 103 MMOL/L — SIGNIFICANT CHANGE UP (ref 98–107)
CHLORIDE SERPL-SCNC: 103 MMOL/L — SIGNIFICANT CHANGE UP (ref 98–107)
CO2 SERPL-SCNC: 17 MMOL/L — LOW (ref 22–31)
CO2 SERPL-SCNC: 22 MMOL/L — SIGNIFICANT CHANGE UP (ref 22–31)
CREAT SERPL-MCNC: 1.57 MG/DL — HIGH (ref 0.5–1.3)
CREAT SERPL-MCNC: 1.58 MG/DL — HIGH (ref 0.5–1.3)
EGFR: 33 ML/MIN/1.73M2 — LOW
EGFR: 33 ML/MIN/1.73M2 — LOW
GLUCOSE BLDC GLUCOMTR-MCNC: 122 MG/DL — HIGH (ref 70–99)
GLUCOSE BLDC GLUCOMTR-MCNC: 122 MG/DL — HIGH (ref 70–99)
GLUCOSE BLDC GLUCOMTR-MCNC: 167 MG/DL — HIGH (ref 70–99)
GLUCOSE BLDC GLUCOMTR-MCNC: 177 MG/DL — HIGH (ref 70–99)
GLUCOSE SERPL-MCNC: 128 MG/DL — HIGH (ref 70–99)
GLUCOSE SERPL-MCNC: 180 MG/DL — HIGH (ref 70–99)
HCT VFR BLD CALC: 35 % — SIGNIFICANT CHANGE UP (ref 34.5–45)
HGB BLD-MCNC: 10.2 G/DL — LOW (ref 11.5–15.5)
LDH SERPL L TO P-CCNC: 630 U/L — HIGH (ref 135–225)
MAGNESIUM SERPL-MCNC: 1.8 MG/DL — SIGNIFICANT CHANGE UP (ref 1.6–2.6)
MAGNESIUM SERPL-MCNC: 2 MG/DL — SIGNIFICANT CHANGE UP (ref 1.6–2.6)
MCHC RBC-ENTMCNC: 26.4 PG — LOW (ref 27–34)
MCHC RBC-ENTMCNC: 29.1 GM/DL — LOW (ref 32–36)
MCV RBC AUTO: 90.4 FL — SIGNIFICANT CHANGE UP (ref 80–100)
NRBC # BLD: 0 /100 WBCS — SIGNIFICANT CHANGE UP (ref 0–0)
NRBC # FLD: 0 K/UL — SIGNIFICANT CHANGE UP (ref 0–0)
NT-PROBNP SERPL-SCNC: 614 PG/ML — HIGH
PHOSPHATE SERPL-MCNC: 3.5 MG/DL — SIGNIFICANT CHANGE UP (ref 2.5–4.5)
PHOSPHATE SERPL-MCNC: 4 MG/DL — SIGNIFICANT CHANGE UP (ref 2.5–4.5)
PLATELET # BLD AUTO: 258 K/UL — SIGNIFICANT CHANGE UP (ref 150–400)
POTASSIUM SERPL-MCNC: 4.3 MMOL/L — SIGNIFICANT CHANGE UP (ref 3.5–5.3)
POTASSIUM SERPL-MCNC: SIGNIFICANT CHANGE UP MMOL/L (ref 3.5–5.3)
POTASSIUM SERPL-SCNC: 4.3 MMOL/L — SIGNIFICANT CHANGE UP (ref 3.5–5.3)
POTASSIUM SERPL-SCNC: SIGNIFICANT CHANGE UP MMOL/L (ref 3.5–5.3)
RBC # BLD: 3.87 M/UL — SIGNIFICANT CHANGE UP (ref 3.8–5.2)
RBC # FLD: 16.5 % — HIGH (ref 10.3–14.5)
SODIUM SERPL-SCNC: 131 MMOL/L — LOW (ref 135–145)
SODIUM SERPL-SCNC: 136 MMOL/L — SIGNIFICANT CHANGE UP (ref 135–145)
WBC # BLD: 7.15 K/UL — SIGNIFICANT CHANGE UP (ref 3.8–10.5)
WBC # FLD AUTO: 7.15 K/UL — SIGNIFICANT CHANGE UP (ref 3.8–10.5)

## 2023-03-20 PROCEDURE — 99232 SBSQ HOSP IP/OBS MODERATE 35: CPT

## 2023-03-20 PROCEDURE — 93306 TTE W/DOPPLER COMPLETE: CPT | Mod: 26

## 2023-03-20 RX ORDER — FAMOTIDINE 10 MG/ML
20 INJECTION INTRAVENOUS DAILY
Refills: 0 | Status: DISCONTINUED | OUTPATIENT
Start: 2023-03-20 | End: 2023-03-24

## 2023-03-20 RX ORDER — LANSOPRAZOLE 15 MG/1
30 CAPSULE, DELAYED RELEASE ORAL DAILY
Refills: 0 | Status: DISCONTINUED | OUTPATIENT
Start: 2023-03-20 | End: 2023-03-28

## 2023-03-20 RX ADMIN — HEPARIN SODIUM 2600 UNIT(S)/HR: 5000 INJECTION INTRAVENOUS; SUBCUTANEOUS at 12:24

## 2023-03-20 RX ADMIN — HEPARIN SODIUM 2200 UNIT(S)/HR: 5000 INJECTION INTRAVENOUS; SUBCUTANEOUS at 20:46

## 2023-03-20 RX ADMIN — HEPARIN SODIUM 0 UNIT(S)/HR: 5000 INJECTION INTRAVENOUS; SUBCUTANEOUS at 11:15

## 2023-03-20 RX ADMIN — Medication 650 MILLIGRAM(S): at 13:17

## 2023-03-20 RX ADMIN — Medication 40 MILLIGRAM(S): at 06:28

## 2023-03-20 RX ADMIN — Medication 1 MILLIGRAM(S): at 12:29

## 2023-03-20 RX ADMIN — HEPARIN SODIUM 2200 UNIT(S)/HR: 5000 INJECTION INTRAVENOUS; SUBCUTANEOUS at 23:21

## 2023-03-20 RX ADMIN — Medication 1: at 19:05

## 2023-03-20 RX ADMIN — HEPARIN SODIUM 0 UNIT(S)/HR: 5000 INJECTION INTRAVENOUS; SUBCUTANEOUS at 00:24

## 2023-03-20 RX ADMIN — HEPARIN SODIUM 3000 UNIT(S)/HR: 5000 INJECTION INTRAVENOUS; SUBCUTANEOUS at 01:27

## 2023-03-20 RX ADMIN — HEPARIN SODIUM 3000 UNIT(S)/HR: 5000 INJECTION INTRAVENOUS; SUBCUTANEOUS at 07:38

## 2023-03-20 RX ADMIN — Medication 650 MILLIGRAM(S): at 23:32

## 2023-03-20 RX ADMIN — HEPARIN SODIUM 0 UNIT(S)/HR: 5000 INJECTION INTRAVENOUS; SUBCUTANEOUS at 19:46

## 2023-03-20 RX ADMIN — FAMOTIDINE 20 MILLIGRAM(S): 10 INJECTION INTRAVENOUS at 12:29

## 2023-03-20 RX ADMIN — HEPARIN SODIUM 0 UNIT(S)/HR: 5000 INJECTION INTRAVENOUS; SUBCUTANEOUS at 19:42

## 2023-03-20 RX ADMIN — Medication 650 MILLIGRAM(S): at 12:29

## 2023-03-20 NOTE — PROGRESS NOTE ADULT - SUBJECTIVE AND OBJECTIVE BOX
PULMONARY PROGRESS NOTE    TORI WOOTEN  MRN-[id number]    Patient is a 77y old  Female who presents with a chief complaint of sob (20 Mar 2023 11:33)      HPI:  -  -    ROS:   -    ACTIVE MEDICATION LIST:  MEDICATIONS  (STANDING):  dextrose 5%. 1000 milliLiter(s) (50 mL/Hr) IV Continuous <Continuous>  dextrose 5%. 1000 milliLiter(s) (100 mL/Hr) IV Continuous <Continuous>  dextrose 50% Injectable 25 Gram(s) IV Push once  dextrose 50% Injectable 12.5 Gram(s) IV Push once  dextrose 50% Injectable 25 Gram(s) IV Push once  famotidine    Tablet 20 milliGRAM(s) Oral daily  folic acid 1 milliGRAM(s) Oral daily  furosemide    Tablet 40 milliGRAM(s) Oral daily  glucagon  Injectable 1 milliGRAM(s) IntraMuscular once  heparin  Infusion.  Unit(s)/Hr (22 mL/Hr) IV Continuous <Continuous>  insulin lispro (ADMELOG) corrective regimen sliding scale   SubCutaneous three times a day before meals  insulin lispro (ADMELOG) corrective regimen sliding scale   SubCutaneous at bedtime    MEDICATIONS  (PRN):  acetaminophen     Tablet .. 650 milliGRAM(s) Oral every 6 hours PRN Temp greater or equal to 38C (100.4F), Mild Pain (1 - 3)  dextrose Oral Gel 15 Gram(s) Oral once PRN Blood Glucose LESS THAN 70 milliGRAM(s)/deciliter  heparin   Injectable 58092 Unit(s) IV Push every 6 hours PRN For aPTT less than 40  heparin   Injectable 5000 Unit(s) IV Push every 6 hours PRN For aPTT between 40 - 57      EXAM:  Vital Signs Last 24 Hrs  T(C): 36.6 (20 Mar 2023 11:53), Max: 36.8 (20 Mar 2023 06:20)  T(F): 97.9 (20 Mar 2023 11:53), Max: 98.2 (20 Mar 2023 06:20)  HR: 86 (20 Mar 2023 11:53) (86 - 118)  BP: 116/66 (20 Mar 2023 11:53) (114/70 - 126/76)  BP(mean): --  RR: 18 (20 Mar 2023 11:53) (18 - 19)  SpO2: 100% (20 Mar 2023 11:53) (97% - 100%)    Parameters below as of 20 Mar 2023 11:53  Patient On (Oxygen Delivery Method): room air        GENERAL: The patient is awake and alert in no apparent distress.     LUNGS: Clear to auscultation without wheezing, rales or rhonchi; respirations unlabored    HEART: Regular rate and rhythm without murmur.                            10.2   7.15  )-----------( 258      ( 20 Mar 2023 06:12 )             35.0       03-20    136  |  103  |  32<H>  ----------------------------<  180<H>  4.3   |  22  |  1.57<H>    Ca    10.4      20 Mar 2023 10:29  Phos  3.5     03-20  Mg     1.80     03-20      >>> <<<    PROBLEM LIST:  77y Female with HEALTH ISSUES - PROBLEM Dx:  Acute pulmonary embolism    Sleep apnea    Diabetes mellitus    Diabetes mellitus    Hypertension    Hyperlipidemia    Chronic atrial fibrillation    PAF (paroxysmal atrial fibrillation)    JACKLYN (acute kidney injury)    Suspected deep vein thrombosis (DVT)    Pulmonary thromboembolism              RECS:        Please call with any questions.    Barb Collazo DO  [facility] Pulmonary/Sleep Medicine  [phone]   PULMONARY PROGRESS NOTE    TORI WOOTEN  MRN-[id number]    Patient is a 77y old  Female who presents with a chief complaint of sob (20 Mar 2023 11:33)      HPI:  comfortable  some cough  on room air    ROS:   -    ACTIVE MEDICATION LIST:  MEDICATIONS  (STANDING):  dextrose 5%. 1000 milliLiter(s) (50 mL/Hr) IV Continuous <Continuous>  dextrose 5%. 1000 milliLiter(s) (100 mL/Hr) IV Continuous <Continuous>  dextrose 50% Injectable 25 Gram(s) IV Push once  dextrose 50% Injectable 12.5 Gram(s) IV Push once  dextrose 50% Injectable 25 Gram(s) IV Push once  famotidine    Tablet 20 milliGRAM(s) Oral daily  folic acid 1 milliGRAM(s) Oral daily  furosemide    Tablet 40 milliGRAM(s) Oral daily  glucagon  Injectable 1 milliGRAM(s) IntraMuscular once  heparin  Infusion.  Unit(s)/Hr (22 mL/Hr) IV Continuous <Continuous>  insulin lispro (ADMELOG) corrective regimen sliding scale   SubCutaneous three times a day before meals  insulin lispro (ADMELOG) corrective regimen sliding scale   SubCutaneous at bedtime    MEDICATIONS  (PRN):  acetaminophen     Tablet .. 650 milliGRAM(s) Oral every 6 hours PRN Temp greater or equal to 38C (100.4F), Mild Pain (1 - 3)  dextrose Oral Gel 15 Gram(s) Oral once PRN Blood Glucose LESS THAN 70 milliGRAM(s)/deciliter  heparin   Injectable 12615 Unit(s) IV Push every 6 hours PRN For aPTT less than 40  heparin   Injectable 5000 Unit(s) IV Push every 6 hours PRN For aPTT between 40 - 57      EXAM:  Vital Signs Last 24 Hrs  T(C): 36.6 (20 Mar 2023 11:53), Max: 36.8 (20 Mar 2023 06:20)  T(F): 97.9 (20 Mar 2023 11:53), Max: 98.2 (20 Mar 2023 06:20)  HR: 86 (20 Mar 2023 11:53) (86 - 118)  BP: 116/66 (20 Mar 2023 11:53) (114/70 - 126/76)  BP(mean): --  RR: 18 (20 Mar 2023 11:53) (18 - 19)  SpO2: 100% (20 Mar 2023 11:53) (97% - 100%)    Parameters below as of 20 Mar 2023 11:53  Patient On (Oxygen Delivery Method): room air        GENERAL: The patient is awake and alert in no apparent distress.     LUNGS: Clear to auscultation without wheezing, rales or rhonchi; respirations unlabored                             10.2   7.15  )-----------( 258      ( 20 Mar 2023 06:12 )             35.0       03-20    136  |  103  |  32<H>  ----------------------------<  180<H>  4.3   |  22  |  1.57<H>    Ca    10.4      20 Mar 2023 10:29  Phos  3.5     03-20  Mg     1.80     03-20       < from: CT Angio Chest PE Protocol w/ IV Cont (03.18.23 @ 01:12) >    ACC: [id number] EXAM:  CT ANGIO CHEST PULM [facility]   ORDERED BY: YAYO MCLEAN     PROCEDURE DATE:  03/18/2023          INTERPRETATION:  CLINICAL INFORMATION: Chest pain. Evaluate for pulmonary   embolism.    COMPARISON: CTA chest 8/8/2020    CONTRAST/COMPLICATIONS:  IV Contrast: Omnipaque 350  70 cc administered   30 cc discarded  Oral Contrast: NONE  Complications: None reported at time of study completion    PROCEDURE:  CT Angiography of the Chest.  Sagittal and coronal reformats were performed as well as 3D (MIP)   reconstructions.    FINDINGS:    LUNGS AND AIRWAYS: Patent central airways.  Left lower lobe calcified   granuloma. A 4 mm nodule in the left lower lobe. Bibasilar subsegmental   atelectasis.  PLEURA: No pleural effusion.  MEDIASTINUM AND JAVIER: No lymphadenopathy and several calcified   mediastinal and hilar lymph nodes.  VESSELS: Acute pulmonary emboli in the right interlobar artery and right   upper, middle, and lower lobar branches. Acute pulmonary emboli within   the left upper and lower lobar branches.  HEART: Increased size of the right ventricle as compared to the left   ventricle, with leftward convexity of the interventricular septum. No   pericardial effusion.  CHEST WALL AND LOWER NECK: Redemonstrated nodular thyroid.  VISUALIZED UPPER ABDOMEN: Small hiatal hernia. Scattered splenic   granulomata. Left renal cyst.  BONES: Chronic fracture deformities of the left fourth through sixth   ribs. Degenerative changes.    IMPRESSION:  Acute bilateral lobar branch pulmonary emboli as described above, with   suggestion of right heart strain.    Martha, MD was informed of these results by Melinda Pinon MD with   read back at about 2:18 AM on 3/18/2023    --- End of Report ---          MELINDA PINON MD; Resident Radiologist  This document has been electronically signed.  ALYSON LEE MD; Attending Radiologist  This document has been electronically signed. Mar 18 2023  3:44AM    < end of copied text >  < from: US Duplex Venous Lower Ext Complete, Bilateral (03.19.23 @ 10:44) >    ACC: [id number] EXAM:  US DPLX LWR EXT VEINS COMPL BI   ORDERED BY: TAMMY KITCHEN     PROCEDURE DATE:  03/19/2023          INTERPRETATION:  CLINICAL INFORMATION: Dyspnea on exertion    COMPARISON: Lower extremity Doppler ultrasound 8/8/2020    TECHNIQUE: Duplex sonography of the BILATERAL LOWER extremity veins with   color and spectral Doppler, with and without compression.    FINDINGS:    RIGHT:  Normal compressibility of the RIGHT common femoral and femoral veins.   Thrombus within the right popliteal vein.  Doppler examination shows normal spontaneous and phasic flow in the   common femoral and femoral veins, and mildly decreased flow within the   right popliteal vein.  The right posterior tibial and gastrocnemius veins are patent. Thrombus  within the right peroneal vein. The soleal vein is not visualized.    LEFT:  Normal compressibility of the LEFT common femoral and femoral veins.   Thrombus within the left popliteal vein.  Doppler examination shows normal spontaneous and phasic flow in the   common femoral and femoral veins, and mildly decreased flow within the   left popliteal vein.  The left posterior tibial vein and gastrocnemius veins are patent.   Thrombus within the left peroneal vein. The soleal vein is not visualized.    IMPRESSION:  Acute Deep venous thrombosis in the BILATERAL popliteal and peroneal   veins.    Findings were discussed with provider Avery by Dr. Gomez on 3/19/2023   11:04 AM with read back confirmation.    --- End of Report ---          ONUR GOMEZ MD; Resident Radiologist  This document has been electronically signed.  SHEREE LEON MD; Attending Radiologist  This document has been electronically signed. Mar 19 2023 11:35AM    < end of copied text >  < from: Transthoracic Echocardiogram (03.20.23 @ 09:01) >  ------------------------------------------------------------------------  CONCLUSIONS:  1. Calcified aortic valve with normal opening. Mild aortic  regurgitation.  2. Normal left ventricular internal dimensions and wall  thicknesses.  3. Normal left ventricular systolic function. No segmental  wall motion abnormalities.  4. Normal right ventricular size and function.  ------------------------------------------------------------------------  Confirmed on  3/20/2023 - 11:33:22 by Bennie Fritz M.D. RPVI  ------------------------------------------------------------------------    < end of copied text >      PROBLEM LIST:  77y Female with HEALTH ISSUES - PROBLEM Dx:  Acute pulmonary embolism    Sleep apnea    Diabetes mellitus    Diabetes mellitus    Hypertension    Hyperlipidemia    Chronic atrial fibrillation    PAF (paroxysmal atrial fibrillation)    JACKLYN (acute kidney injury)    Suspected deep vein thrombosis (DVT)    Pulmonary thromboembolism              RECS:  full AC  defer to cardio re:  NOAC  oob/chair  close f/u with DR Valenzuela    Please call with any questions.    Barb Collazo DO  [facility] Pulmonary/Sleep Medicine  [phone]

## 2023-03-20 NOTE — PROGRESS NOTE ADULT - ASSESSMENT
76 yo lady PMHx of paroxysmal Afib not on AC, remote hx of b/l knee replacement, and HTN, who is admitted for b/l lobar branch PE, with evidence of R heart strain on CT angio, consistent with submassive PE. Found also to have b/l LE DVT's. Treating with systemic AC.    #Submassive PE - intermediate-high risk  - c/w heparin gtt AC  - Elevated proBNP and evidence of RV strain on CT  - proBNP 3049 -> 1368 -> 614  - trop-hs negative x3  - TTE with no evidence of RV strain/dysfunction  - US duplex LE b/l 3/19 w/ acute DVT in b/l popliteal and peroneal veins  - Close monitoring of hemodynamics, immediately notify PERT w/ any changes in hemodynamics  - Age appropriate malignancy screening warranted    Travis Murguia MD  Cardiology Fellow - PGY4    Please check [email or web address] [email or web address]" for cardiology service schedule and contact information.

## 2023-03-20 NOTE — PROGRESS NOTE ADULT - SUBJECTIVE AND OBJECTIVE BOX
Cardiology Progress Note  ------------------------------------------------------------------------------------------  SUBJECTIVE:   - Tele: sinus rates 90's-100's, w/ PVC's  - No events overnight. States breathing feels improved compared to yesterday but still dyspneic with exertion. Denies CP or Palpitations.   -------------------------------------------------------------------------------------------  ROS:  CV: chest pain (-), palpitation (-), orthopnea (-), PND (-), edema (-)  PULM: SOB (+), cough (-), wheezing (-), hemoptysis (-).   CONST: fever (-), chills (-) or fatigue (-)  GI: abdominal distension (-), abdominal pain (-) , nausea/vomiting (-), hematemesis, (-), melena (-), hematochezia (-)  : dysuria (-), frequency (-), hematuria (-).   NEURO: numbness (-), weakness (-), dizziness (-)  MSK: myalgia (-), joint pain (-)   SKIN: itching (-), rash (-)  HEENT:  visual changes (-); vertigo or throat pain (-);  neck stiffness (-)   Psych: change in mood (-), anxiety (-), depression (-)     All other review of systems is negative unless indicated above.   -------------------------------------------------------------------------------------------  VS:  T(F): 98.2 (-), Max: 98.2 ()  HR: 103 () (87 - 130)  BP: 126/76 (-) (114/70 - 137/73)  RR: 18 (-)  SpO2: 97% ()  I&O's Summary    PHYSICAL EXAM:  GENERAL: NAD  HEAD:  Atraumatic, Normocephalic.  EYES: EOMI, PERRLA, conjunctiva and sclera clear.  ENT: Moist mucous membranes.  NECK: Supple, No JVD.  CHEST/LUNG: Clear to auscultation bilaterally; No rales, rhonchi, wheezing, or rubs. Unlabored respirations.  HEART: Regular rate and rhythm; No murmurs, rubs, or gallops.  ABDOMEN: Bowel sounds present; Soft, Nontender, Nondistended.   EXTREMITIES: No edema. 2+ Peripheral Pulses, brisk capillary refill. No clubbing or cyanosis.   PSYCH: Normal affect.  SKIN: No rashes or lesions.  -------------------------------------------------------------------------------------------  LABS:                          10.2   7.15  )-----------( 258      ( 20 Mar 2023 06:12 )             35.0     -20    136  |  103  |  32<H>  ----------------------------<  180<H>  4.3   |  22  |  1.57<H>    Ca    10.4      20 Mar 2023 10:29  Phos  3.5     03-  Mg     1.80     20      PTT - ( 20 Mar 2023 10:29 )  PTT:159.8 sec  CARDIAC MARKERS ( 19 Mar 2023 12:11 )  27 ng/L / x     / x     / x     / x     / x      CARDIAC MARKERS ( 18 Mar 2023 01:02 )  35 ng/L / x     / x     / x     / x     / x      CARDIAC MARKERS ( 17 Mar 2023 21:29 )  35 ng/L / x     / x     / x     / x     / x          Total Cholesterol: 201  LDL: --  HDL: 57  T        -------------------------------------------------------------------------------------------  Meds:  acetaminophen     Tablet .. 650 milliGRAM(s) Oral every 6 hours PRN  dextrose 5%. 1000 milliLiter(s) IV Continuous <Continuous>  dextrose 5%. 1000 milliLiter(s) IV Continuous <Continuous>  dextrose 50% Injectable 25 Gram(s) IV Push once  dextrose 50% Injectable 12.5 Gram(s) IV Push once  dextrose 50% Injectable 25 Gram(s) IV Push once  dextrose Oral Gel 15 Gram(s) Oral once PRN  famotidine    Tablet 20 milliGRAM(s) Oral daily  folic acid 1 milliGRAM(s) Oral daily  furosemide    Tablet 40 milliGRAM(s) Oral daily  glucagon  Injectable 1 milliGRAM(s) IntraMuscular once  heparin   Injectable 57906 Unit(s) IV Push every 6 hours PRN  heparin   Injectable 5000 Unit(s) IV Push every 6 hours PRN  heparin  Infusion.  Unit(s)/Hr IV Continuous <Continuous>  insulin lispro (ADMELOG) corrective regimen sliding scale   SubCutaneous three times a day before meals  insulin lispro (ADMELOG) corrective regimen sliding scale   SubCutaneous at bedtime    -------------------------------------------------------------------------------------------  TTE 3/20/23:  Ejection Fraction (Modified Dumont Rule): 70 %  ------------------------------------------------------------------------  OBSERVATIONS:  Mitral Valve: Mitral annular calcification, otherwise  normal mitral valve.  Aortic Root: Normal aortic root.  Aortic Valve: Calcified aortic valve with normal opening.  Mild aortic regurgitation.  Left Atrium: Normal left atrium.  LA volume index = 21  cc/m2.  Left Ventricle: Normal left ventricular systolic function.  No segmental wall motion abnormalities. Increased relative  wall thickness with normal left ventricular mass index,  consistent with concentric left ventricular remodeling.  Reversal of the E-A  waves of the mitral inflow pattern is  consistent with diastolic LV dysfunction.  Right Heart: Normal right atrium. Normal right ventricular  size and function. Normal tricuspid valve. Mild tricuspid  regurgitation. Normal pulmonic valve.  Pericardium/PleuraNormal pericardium with no pericardial  effusion.  Hemodynamic: Estimated right ventricular systolic pressure  equals 26 mm Hg, assuming right atrial pressure equals 10  mm Hg, consistent with normal pulmonary pressures.  ------------------------------------------------------------------------  CONCLUSIONS:  1. Calcified aortic valve with normal opening. Mild aortic  regurgitation.  2. Normal left ventricular internal dimensions and wall  thicknesses.  3. Normal left ventricular systolic function. No segmental  wall motion abnormalities.  4. Normal right ventricular size and function.      -------------------------------------------------------------------------------------------

## 2023-03-20 NOTE — PROGRESS NOTE ADULT - SUBJECTIVE AND OBJECTIVE BOX
SUBJECTIVE / OVERNIGHT EVENTS:pt seen and examined  03-20-23     MEDICATIONS  (STANDING):  dextrose 5%. 1000 milliLiter(s) (50 mL/Hr) IV Continuous <Continuous>  dextrose 5%. 1000 milliLiter(s) (100 mL/Hr) IV Continuous <Continuous>  dextrose 50% Injectable 25 Gram(s) IV Push once  dextrose 50% Injectable 12.5 Gram(s) IV Push once  dextrose 50% Injectable 25 Gram(s) IV Push once  famotidine    Tablet 20 milliGRAM(s) Oral daily  folic acid 1 milliGRAM(s) Oral daily  furosemide    Tablet 40 milliGRAM(s) Oral daily  glucagon  Injectable 1 milliGRAM(s) IntraMuscular once  heparin  Infusion.  Unit(s)/Hr (22 mL/Hr) IV Continuous <Continuous>  insulin lispro (ADMELOG) corrective regimen sliding scale   SubCutaneous three times a day before meals  insulin lispro (ADMELOG) corrective regimen sliding scale   SubCutaneous at bedtime  lansoprazole Capsule 30 milliGRAM(s) Oral daily    MEDICATIONS  (PRN):  acetaminophen     Tablet .. 650 milliGRAM(s) Oral every 6 hours PRN Temp greater or equal to 38C (100.4F), Mild Pain (1 - 3)  dextrose Oral Gel 15 Gram(s) Oral once PRN Blood Glucose LESS THAN 70 milliGRAM(s)/deciliter  heparin   Injectable 47299 Unit(s) IV Push every 6 hours PRN For aPTT less than 40  heparin   Injectable 5000 Unit(s) IV Push every 6 hours PRN For aPTT between 40 - 57    Vital Signs Last 24 Hrs  T(C): 36.7 (03-20-23 @ 16:50), Max: 36.8 (03-20-23 @ 06:20)  T(F): 98 (03-20-23 @ 16:50), Max: 98.2 (03-20-23 @ 06:20)  HR: 89 (03-20-23 @ 19:55) (86 - 103)  BP: 112/67 (03-20-23 @ 16:50) (112/67 - 126/76)  BP(mean): --  RR: 18 (03-20-23 @ 16:50) (18 - 18)  SpO2: 98% (03-20-23 @ 19:55) (97% - 100%)        Constitutional: No fever, fatigue  Skin: No rash.  Eyes: No recent vision problems or eye pain.  ENT: No congestion, ear pain, or sore throat.  Cardiovascular: No chest pain or palpation.  Respiratory: No cough, shortness of breath, congestion, or wheezing.  Gastrointestinal: No abdominal pain, nausea, vomiting, or diarrhea.  Genitourinary: No dysuria.  Musculoskeletal: No joint swelling.  Neurologic: No headache.    PHYSICAL EXAM:  GENERAL: NAD  EYES: EOMI, PERRLA  NECK: Supple, No JVD  CHEST/LUNG: dec breath sounds at bases  HEART:  S1 , S2 +  ABDOMEN: soft, bs+  EXTREMITIES:  edema+  NEUROLOGY:alert awake oriented       LABS:  03-20    136  |  103  |  32<H>  ----------------------------<  180<H>  4.3   |  22  |  1.57<H>    Ca    10.4      20 Mar 2023 10:29  Phos  3.5     03-20  Mg     1.80     03-20      Creatinine Trend: 1.57 <--, 1.58 <--, 1.54 <--, 1.58 <--, 1.56 <--, 1.59 <--                        10.2   7.15  )-----------( 258      ( 20 Mar 2023 06:12 )             35.0     Urine Studies:              PTT - ( 20 Mar 2023 18:57 )  PTT:>200.0 sec        RADIOLOGY & ADDITIONAL TESTS:    Imaging Personally Reviewed:yes    Consultant(s) Notes Reviewed:  yes    Care Discussed with Consultants/Other Providers:yes

## 2023-03-21 ENCOUNTER — TRANSCRIPTION ENCOUNTER (OUTPATIENT)
Age: 78
End: 2023-03-21

## 2023-03-21 LAB
ANION GAP SERPL CALC-SCNC: 10 MMOL/L — SIGNIFICANT CHANGE UP (ref 7–14)
APPEARANCE UR: CLEAR — SIGNIFICANT CHANGE UP
APTT BLD: >200 SEC — CRITICAL HIGH (ref 27–36.3)
APTT BLD: >200 SEC — SIGNIFICANT CHANGE UP (ref 27–36.3)
BILIRUB UR-MCNC: NEGATIVE — SIGNIFICANT CHANGE UP
BUN SERPL-MCNC: 33 MG/DL — HIGH (ref 7–23)
CALCIUM SERPL-MCNC: 10.2 MG/DL — SIGNIFICANT CHANGE UP (ref 8.4–10.5)
CHLORIDE SERPL-SCNC: 103 MMOL/L — SIGNIFICANT CHANGE UP (ref 98–107)
CO2 SERPL-SCNC: 22 MMOL/L — SIGNIFICANT CHANGE UP (ref 22–31)
COLOR SPEC: SIGNIFICANT CHANGE UP
CREAT ?TM UR-MCNC: 157 MG/DL — SIGNIFICANT CHANGE UP
CREAT SERPL-MCNC: 1.62 MG/DL — HIGH (ref 0.5–1.3)
DIFF PNL FLD: NEGATIVE — SIGNIFICANT CHANGE UP
EGFR: 32 ML/MIN/1.73M2 — LOW
GLUCOSE BLDC GLUCOMTR-MCNC: 126 MG/DL — HIGH (ref 70–99)
GLUCOSE BLDC GLUCOMTR-MCNC: 135 MG/DL — HIGH (ref 70–99)
GLUCOSE BLDC GLUCOMTR-MCNC: 139 MG/DL — HIGH (ref 70–99)
GLUCOSE BLDC GLUCOMTR-MCNC: 144 MG/DL — HIGH (ref 70–99)
GLUCOSE SERPL-MCNC: 130 MG/DL — HIGH (ref 70–99)
GLUCOSE UR QL: NEGATIVE — SIGNIFICANT CHANGE UP
KETONES UR-MCNC: NEGATIVE — SIGNIFICANT CHANGE UP
LEUKOCYTE ESTERASE UR-ACNC: NEGATIVE — SIGNIFICANT CHANGE UP
MAGNESIUM SERPL-MCNC: 1.9 MG/DL — SIGNIFICANT CHANGE UP (ref 1.6–2.6)
NITRITE UR-MCNC: NEGATIVE — SIGNIFICANT CHANGE UP
PH UR: 5.5 — SIGNIFICANT CHANGE UP (ref 5–8)
PHOSPHATE SERPL-MCNC: 4.2 MG/DL — SIGNIFICANT CHANGE UP (ref 2.5–4.5)
POTASSIUM SERPL-MCNC: 4.6 MMOL/L — SIGNIFICANT CHANGE UP (ref 3.5–5.3)
POTASSIUM SERPL-SCNC: 4.6 MMOL/L — SIGNIFICANT CHANGE UP (ref 3.5–5.3)
PROT ?TM UR-MCNC: 14 MG/DL — SIGNIFICANT CHANGE UP
PROT UR-MCNC: ABNORMAL
PROT/CREAT UR-RTO: 0.1 RATIO — SIGNIFICANT CHANGE UP (ref 0–0.2)
RBC CASTS # UR COMP ASSIST: 1 /HPF — SIGNIFICANT CHANGE UP (ref 0–4)
SODIUM SERPL-SCNC: 135 MMOL/L — SIGNIFICANT CHANGE UP (ref 135–145)
SP GR SPEC: 1.02 — SIGNIFICANT CHANGE UP (ref 1.01–1.05)
UROBILINOGEN FLD QL: SIGNIFICANT CHANGE UP
WBC UR QL: 1 /HPF — SIGNIFICANT CHANGE UP (ref 0–5)

## 2023-03-21 PROCEDURE — 99232 SBSQ HOSP IP/OBS MODERATE 35: CPT

## 2023-03-21 PROCEDURE — 99233 SBSQ HOSP IP/OBS HIGH 50: CPT

## 2023-03-21 PROCEDURE — 76770 US EXAM ABDO BACK WALL COMP: CPT | Mod: 26

## 2023-03-21 RX ORDER — APIXABAN 2.5 MG/1
10 TABLET, FILM COATED ORAL EVERY 12 HOURS
Refills: 0 | Status: DISCONTINUED | OUTPATIENT
Start: 2023-03-21 | End: 2023-03-28

## 2023-03-21 RX ORDER — MAGNESIUM OXIDE 400 MG ORAL TABLET 241.3 MG
400 TABLET ORAL
Refills: 0 | Status: COMPLETED | OUTPATIENT
Start: 2023-03-21 | End: 2023-03-23

## 2023-03-21 RX ORDER — APIXABAN 2.5 MG/1
10 TABLET, FILM COATED ORAL
Refills: 0 | Status: DISCONTINUED | OUTPATIENT
Start: 2023-03-21 | End: 2023-03-21

## 2023-03-21 RX ORDER — HEPARIN SODIUM 5000 [USP'U]/ML
1800 INJECTION INTRAVENOUS; SUBCUTANEOUS
Qty: 25000 | Refills: 0 | Status: DISCONTINUED | OUTPATIENT
Start: 2023-03-21 | End: 2023-03-21

## 2023-03-21 RX ORDER — ACETAMINOPHEN 500 MG
1000 TABLET ORAL ONCE
Refills: 0 | Status: DISCONTINUED | OUTPATIENT
Start: 2023-03-21 | End: 2023-03-28

## 2023-03-21 RX ORDER — LANSOPRAZOLE 15 MG/1
1 CAPSULE, DELAYED RELEASE ORAL
Qty: 0 | Refills: 0 | DISCHARGE
Start: 2023-03-21

## 2023-03-21 RX ORDER — APIXABAN 2.5 MG/1
1 TABLET, FILM COATED ORAL
Qty: 60 | Refills: 0
Start: 2023-03-21 | End: 2023-04-19

## 2023-03-21 RX ORDER — HEPARIN SODIUM 5000 [USP'U]/ML
5000 INJECTION INTRAVENOUS; SUBCUTANEOUS EVERY 6 HOURS
Refills: 0 | Status: DISCONTINUED | OUTPATIENT
Start: 2023-03-21 | End: 2023-03-21

## 2023-03-21 RX ORDER — FAMOTIDINE 10 MG/ML
1 INJECTION INTRAVENOUS
Qty: 0 | Refills: 0 | DISCHARGE
Start: 2023-03-21

## 2023-03-21 RX ORDER — CYCLOBENZAPRINE HYDROCHLORIDE 10 MG/1
5 TABLET, FILM COATED ORAL EVERY 12 HOURS
Refills: 0 | Status: DISCONTINUED | OUTPATIENT
Start: 2023-03-21 | End: 2023-03-28

## 2023-03-21 RX ORDER — HEPARIN SODIUM 5000 [USP'U]/ML
10000 INJECTION INTRAVENOUS; SUBCUTANEOUS EVERY 6 HOURS
Refills: 0 | Status: DISCONTINUED | OUTPATIENT
Start: 2023-03-21 | End: 2023-03-21

## 2023-03-21 RX ADMIN — HEPARIN SODIUM 1400 UNIT(S)/HR: 5000 INJECTION INTRAVENOUS; SUBCUTANEOUS at 13:38

## 2023-03-21 RX ADMIN — Medication 650 MILLIGRAM(S): at 11:51

## 2023-03-21 RX ADMIN — HEPARIN SODIUM 0 UNIT(S)/HR: 5000 INJECTION INTRAVENOUS; SUBCUTANEOUS at 12:38

## 2023-03-21 RX ADMIN — Medication 1 MILLIGRAM(S): at 11:48

## 2023-03-21 RX ADMIN — HEPARIN SODIUM 1800 UNIT(S)/HR: 5000 INJECTION INTRAVENOUS; SUBCUTANEOUS at 10:38

## 2023-03-21 RX ADMIN — HEPARIN SODIUM 1400 UNIT(S)/HR: 5000 INJECTION INTRAVENOUS; SUBCUTANEOUS at 13:36

## 2023-03-21 RX ADMIN — MAGNESIUM OXIDE 400 MG ORAL TABLET 400 MILLIGRAM(S): 241.3 TABLET ORAL at 18:42

## 2023-03-21 RX ADMIN — Medication 650 MILLIGRAM(S): at 00:32

## 2023-03-21 RX ADMIN — HEPARIN SODIUM 0 UNIT(S)/HR: 5000 INJECTION INTRAVENOUS; SUBCUTANEOUS at 04:14

## 2023-03-21 RX ADMIN — HEPARIN SODIUM 1800 UNIT(S)/HR: 5000 INJECTION INTRAVENOUS; SUBCUTANEOUS at 07:42

## 2023-03-21 RX ADMIN — Medication 650 MILLIGRAM(S): at 21:21

## 2023-03-21 RX ADMIN — Medication 650 MILLIGRAM(S): at 12:50

## 2023-03-21 RX ADMIN — HEPARIN SODIUM 1800 UNIT(S)/HR: 5000 INJECTION INTRAVENOUS; SUBCUTANEOUS at 05:18

## 2023-03-21 RX ADMIN — APIXABAN 10 MILLIGRAM(S): 2.5 TABLET, FILM COATED ORAL at 18:41

## 2023-03-21 RX ADMIN — Medication 40 MILLIGRAM(S): at 05:21

## 2023-03-21 RX ADMIN — LANSOPRAZOLE 30 MILLIGRAM(S): 15 CAPSULE, DELAYED RELEASE ORAL at 11:48

## 2023-03-21 RX ADMIN — Medication 100 MILLIGRAM(S): at 21:20

## 2023-03-21 NOTE — DISCHARGE NOTE PROVIDER - CARE PROVIDERS DIRECT ADDRESSES
,DirectAddress_Unknown,DirectAddress_Unknown,[email or web address]

## 2023-03-21 NOTE — PROGRESS NOTE ADULT - SUBJECTIVE AND OBJECTIVE BOX
SUBJECTIVE / OVERNIGHT EVENTS:pt seen and examined  03-21-23     MEDICATIONS  (STANDING):  apixaban 10 milliGRAM(s) Oral every 12 hours  dextrose 5%. 1000 milliLiter(s) (50 mL/Hr) IV Continuous <Continuous>  dextrose 5%. 1000 milliLiter(s) (100 mL/Hr) IV Continuous <Continuous>  dextrose 50% Injectable 25 Gram(s) IV Push once  dextrose 50% Injectable 12.5 Gram(s) IV Push once  dextrose 50% Injectable 25 Gram(s) IV Push once  famotidine    Tablet 20 milliGRAM(s) Oral daily  folic acid 1 milliGRAM(s) Oral daily  furosemide    Tablet 40 milliGRAM(s) Oral daily  glucagon  Injectable 1 milliGRAM(s) IntraMuscular once  insulin lispro (ADMELOG) corrective regimen sliding scale   SubCutaneous three times a day before meals  insulin lispro (ADMELOG) corrective regimen sliding scale   SubCutaneous at bedtime  lansoprazole Capsule 30 milliGRAM(s) Oral daily  magnesium oxide 400 milliGRAM(s) Oral three times a day with meals    MEDICATIONS  (PRN):  acetaminophen     Tablet .. 650 milliGRAM(s) Oral every 6 hours PRN Temp greater or equal to 38C (100.4F), Mild Pain (1 - 3)  acetaminophen   IVPB .. 1000 milliGRAM(s) IV Intermittent once PRN Severe Pain (7 - 10)  cyclobenzaprine 5 milliGRAM(s) Oral every 12 hours PRN Muscle Spasm  dextrose Oral Gel 15 Gram(s) Oral once PRN Blood Glucose LESS THAN 70 milliGRAM(s)/deciliter  guaiFENesin Oral Liquid (Sugar-Free) 100 milliGRAM(s) Oral every 6 hours PRN Cough    Vital Signs Last 24 Hrs  T(C): 37.1 (03-21-23 @ 20:00), Max: 37.1 (03-21-23 @ 00:50)  T(F): 98.7 (03-21-23 @ 20:00), Max: 98.8 (03-21-23 @ 00:50)  HR: 104 (03-21-23 @ 20:00) (83 - 104)  BP: 106/56 (03-21-23 @ 20:00) (106/56 - 135/63)  BP(mean): --  RR: 18 (03-21-23 @ 20:00) (18 - 18)  SpO2: 99% (03-21-23 @ 20:00) (97% - 100%)          Constitutional: No fever, fatigue  Skin: No rash.  Eyes: No recent vision problems or eye pain.  ENT: No congestion, ear pain, or sore throat.  Cardiovascular: No chest pain or palpation.  Respiratory: No cough, shortness of breath, congestion, or wheezing.  Gastrointestinal: No abdominal pain, nausea, vomiting, or diarrhea.  Genitourinary: No dysuria.  Musculoskeletal: No joint swelling.  Neurologic: No headache.    PHYSICAL EXAM:  GENERAL: NAD  EYES: EOMI, PERRLA  NECK: Supple, No JVD  CHEST/LUNG: dec breath sounds at bases  HEART:  S1 , S2 +  ABDOMEN: soft, bs+  EXTREMITIES:  edema+  NEUROLOGY:alert awake oriented       LABS:  03-21    135  |  103  |  33<H>  ----------------------------<  130<H>  4.6   |  22  |  1.62<H>    Ca    10.2      21 Mar 2023 03:07  Phos  4.2     03-21  Mg     1.90     03-21      Creatinine Trend: 1.62 <--, 1.57 <--, 1.58 <--, 1.54 <--, 1.58 <--, 1.56 <--, 1.59 <--                        10.2   7.15  )-----------( 258      ( 20 Mar 2023 06:12 )             35.0     Urine Studies:              PTT - ( 21 Mar 2023 11:15 )  PTT:>200.0 sec      RADIOLOGY & ADDITIONAL TESTS:    Imaging Personally Reviewed:yes    Consultant(s) Notes Reviewed:  yes    Care Discussed with Consultants/Other Providers:yes

## 2023-03-21 NOTE — CONSULT NOTE ADULT - SUBJECTIVE AND OBJECTIVE BOX
[facility] NEPHROLOGY- CONSULTATION NOTE    77y Female with history of below presents with SOB. Nephrology consulted for elevated Scr.    Patient denies any prior history of renal disease. Patient admits to h/o HTN for over 30 years and h/o DM for over 10 years without retinopathy or neuropathy. Patient denies any h/o nephrolithiasis, hepatitis, HIV, IVDA, tattoos, herbal medication use, chronic NSAID use but admits to PRBC 30 years ago.    Patient on olmesartan 40 mg daily, aldactone 25 mg daily and lasix 40 mg daily as an outpatient.    REVIEW OF SYSTEMS:  Gen: no fevers  HEENT: no rhinorrhea  Neck: no sore throat  Cards: no chest pain  Resp: + dyspnea  GI: no nausea or vomiting or diarrhea  : no dysuria or hematuria  Vascular: + LE edema  Derm: no rashes  Neuro: no numbness/tingling    codeine (Other)  penicillin (Blisters)      Home Medications Reviewed  Hospital Medications:   MEDICATIONS  (STANDING):  dextrose 5%. 1000 milliLiter(s) (50 mL/Hr) IV Continuous <Continuous>  dextrose 5%. 1000 milliLiter(s) (100 mL/Hr) IV Continuous <Continuous>  dextrose 50% Injectable 25 Gram(s) IV Push once  dextrose 50% Injectable 12.5 Gram(s) IV Push once  dextrose 50% Injectable 25 Gram(s) IV Push once  famotidine    Tablet 20 milliGRAM(s) Oral daily  folic acid 1 milliGRAM(s) Oral daily  furosemide    Tablet 40 milliGRAM(s) Oral daily  glucagon  Injectable 1 milliGRAM(s) IntraMuscular once  heparin  Infusion. 1800 Unit(s)/Hr (18 mL/Hr) IV Continuous <Continuous>  insulin lispro (ADMELOG) corrective regimen sliding scale   SubCutaneous three times a day before meals  insulin lispro (ADMELOG) corrective regimen sliding scale   SubCutaneous at bedtime  lansoprazole Capsule 30 milliGRAM(s) Oral daily  magnesium oxide 400 milliGRAM(s) Oral three times a day with meals      PAST MEDICAL & SURGICAL HISTORY:  HTN (hypertension)      Sleep apnea      Diabetes mellitus      Chronic GERD      HLD (hyperlipidemia)      S/P knee replacement  Right (  )      History of endometrial ablation        S/P  section  1977      History of unilateral oophorectomy            FAMILY HISTORY:  N/C    SOCIAL HISTORY:  Denies toxic substance use     VITALS:  T(F): 98.4 (23 @ 08:45), Max: 98.8 (23 @ 00:50)  HR: 83 (23 @ 08:56)  BP: 135/63 (23 @ 08:45)  RR: 18 (23 @ 08:45)  SpO2: 98% (23 @ 08:56)  Wt(kg): --    Height (cm): 154.9 ( @ 10:16)    PHYSICAL EXAM:  Gen: NAD, calm  HEENT: MMM  Neck: no JVD  Cards: RRR, +S1/S2, no M/G/R  Resp: CTA B/L  GI: soft, NT/ND, NABS  : no CVA tenderness  Vascular: trace LE edema B/L  Derm: no rashes  Neuro: non-focal    LABS:      135  |  103  |  33<H>  ----------------------------<  130<H>  4.6   |  22  |  1.62<H>    Ca    10.2      21 Mar 2023 03:07  Phos  4.2       Mg     1.90           Creatinine Trend: 1.62 <--, 1.57 <--, 1.58 <--, 1.54 <--, 1.58 <--, 1.56 <--, 1.59 <--                        10.2   7.15  )-----------( 258      ( 20 Mar 2023 06:12 )             35.0     Urine Studies:        RADIOLOGY & ADDITIONAL STUDIES:    < from: Xray Chest 1 View- PORTABLE-Urgent (Xray Chest 1 View- PORTABLE-Urgent .) (23 @ 22:59) >    IMPRESSION:  Clear lungs.    < end of copied text >      < from: CT Angio Chest PE Protocol w/ IV Cont (23 @ 01:12) >  IMPRESSION:  Acute bilateral lobar branch pulmonary emboli as described above, with   suggestion of right heart strain.    MD Martha was informed of these results by Roberto Pinon MD with   read back at about 2:18 AM on 3/18/2023    --- End of Report ---    < end of copied text >      < from: US Duplex Venous Lower Ext Complete, Bilateral (23 @ 10:44) >  IMPRESSION:  Acute Deep venous thrombosis in the BILATERAL popliteal and peroneal   veins.    Findings were discussed with provider Avery by Dr. Miller on 3/19/2023   11:04 AM with read back confirmation.    --- End of Report ---    < end of copied text >

## 2023-03-21 NOTE — PROGRESS NOTE ADULT - SUBJECTIVE AND OBJECTIVE BOX
Cardiology Progress Note  ------------------------------------------------------------------------------------------  SUBJECTIVE:   - Tele: sinus w/ PVC's  - No events overnight. States breathing continuing to improve compared to yesterday but not back to baseline. Denies CP or Palpitations.   -------------------------------------------------------------------------------------------  ROS:  CV: chest pain (-), palpitation (-), orthopnea (-), PND (-), edema (-)  PULM: SOB (+), cough (-), wheezing (-), hemoptysis (-).   CONST: fever (-), chills (-) or fatigue (-)  GI: abdominal distension (-), abdominal pain (-) , nausea/vomiting (-), hematemesis, (-), melena (-), hematochezia (-)  : dysuria (-), frequency (-), hematuria (-).   NEURO: numbness (-), weakness (-), dizziness (-)  MSK: myalgia (-), joint pain (-)   SKIN: itching (-), rash (-)  HEENT:  visual changes (-); vertigo or throat pain (-);  neck stiffness (-)   Psych: change in mood (-), anxiety (-), depression (-)     All other review of systems is negative unless indicated above.   -------------------------------------------------------------------------------------------  VS:  T(F): 98.4 (), Max: 98.8 ()  HR: 83 () (83 - 103)  BP: 135/63 () (111/83 - 135/63)  RR: 18 ()  SpO2: 98% ()  I&O's Summary    PHYSICAL EXAM:  GENERAL: NAD on NC  HEAD:  Atraumatic, Normocephalic.  EYES: EOMI, PERRLA, conjunctiva and sclera clear.  ENT: Moist mucous membranes.  NECK: Supple, No JVD.  CHEST/LUNG: Clear to auscultation bilaterally; No rales, rhonchi, wheezing, or rubs. Unlabored respirations.  HEART: Regular rate and rhythm; No murmurs, rubs, or gallops.  ABDOMEN: Bowel sounds present; Soft, Nontender, Nondistended.   EXTREMITIES: No edema. 2+ Peripheral Pulses, brisk capillary refill. No clubbing or cyanosis.   PSYCH: Normal affect.  SKIN: No rashes or lesions.  -------------------------------------------------------------------------------------------  LABS:                          10.2   7.15  )-----------( 258      ( 20 Mar 2023 06:12 )             35.0         135  |  103  |  33<H>  ----------------------------<  130<H>  4.6   |  22  |  1.62<H>    Ca    10.2      21 Mar 2023 03:07  Phos  4.2       Mg     1.90           PTT - ( 21 Mar 2023 03:07 )  PTT:>200 sec  CARDIAC MARKERS ( 19 Mar 2023 12:11 )  27 ng/L / x     / x     / x     / x     / x      CARDIAC MARKERS ( 18 Mar 2023 01:02 )  35 ng/L / x     / x     / x     / x     / x      CARDIAC MARKERS ( 17 Mar 2023 21:29 )  35 ng/L / x     / x     / x     / x     / x          Total Cholesterol: 201  LDL: --  HDL: 57  T        -------------------------------------------------------------------------------------------  Meds:  acetaminophen     Tablet .. 650 milliGRAM(s) Oral every 6 hours PRN  apixaban 10 milliGRAM(s) Oral two times a day  dextrose 5%. 1000 milliLiter(s) IV Continuous <Continuous>  dextrose 5%. 1000 milliLiter(s) IV Continuous <Continuous>  dextrose 50% Injectable 25 Gram(s) IV Push once  dextrose 50% Injectable 12.5 Gram(s) IV Push once  dextrose 50% Injectable 25 Gram(s) IV Push once  dextrose Oral Gel 15 Gram(s) Oral once PRN  famotidine    Tablet 20 milliGRAM(s) Oral daily  folic acid 1 milliGRAM(s) Oral daily  furosemide    Tablet 40 milliGRAM(s) Oral daily  glucagon  Injectable 1 milliGRAM(s) IntraMuscular once  insulin lispro (ADMELOG) corrective regimen sliding scale   SubCutaneous three times a day before meals  insulin lispro (ADMELOG) corrective regimen sliding scale   SubCutaneous at bedtime  lansoprazole Capsule 30 milliGRAM(s) Oral daily    -------------------------------------------------------------------------------------------  TTE 3/20/23:  Ejection Fraction (Modified Dumont Rule): 70 %  ------------------------------------------------------------------------  OBSERVATIONS:  Mitral Valve: Mitral annular calcification, otherwise  normal mitral valve.  Aortic Root: Normal aortic root.  Aortic Valve: Calcified aortic valve with normal opening.  Mild aortic regurgitation.  Left Atrium: Normal left atrium.  LA volume index = 21  cc/m2.  Left Ventricle: Normal left ventricular systolic function.  No segmental wall motion abnormalities. Increased relative  wall thickness with normal left ventricular mass index,  consistent with concentric left ventricular remodeling.  Reversal of the E-A  waves of the mitral inflow pattern is  consistent with diastolic LV dysfunction.  Right Heart: Normal right atrium. Normal right ventricular  size and function. Normal tricuspid valve. Mild tricuspid  regurgitation. Normal pulmonic valve.  Pericardium/PleuraNormal pericardium with no pericardial  effusion.  Hemodynamic: Estimated right ventricular systolic pressure  equals 26 mm Hg, assuming right atrial pressure equals 10  mm Hg, consistent with normal pulmonary pressures.  ------------------------------------------------------------------------  CONCLUSIONS:  1. Calcified aortic valve with normal opening. Mild aortic  regurgitation.  2. Normal left ventricular internal dimensions and wall  thicknesses.  3. Normal left ventricular systolic function. No segmental  wall motion abnormalities.  4. Normal right ventricular size and function.        -------------------------------------------------------------------------------------------

## 2023-03-21 NOTE — DISCHARGE NOTE PROVIDER - CARE PROVIDER_API CALL
Mitch Tatum (DO)  Internal Medicine  [address], [place] [address]  Phone: [phone]  Fax: [phone]  Established Patient  Follow Up Time: 2 weeks    Emeterio Winn)  Internal Medicine  [address] [place] [address]  Phone: [phone]  Fax: [phone]  Established Patient  Follow Up Time: 1 week    Ra Valenzuela  CRITICAL CARE MEDICINE  [address] [facility], [address]  [place] [address]  Phone: [phone]  Fax: [phone]  Established Patient  Follow Up Time: 1 week

## 2023-03-21 NOTE — DISCHARGE NOTE PROVIDER - NSDCFUSCHEDAPPT_GEN_ALL_CORE_FT
[facility] Physician [facility]  CATSCAN [address]  Scheduled Appointment: 03/24/2023    [facility]  ULTRASND  [facility]   Scheduled Appointment: 03/24/2023

## 2023-03-21 NOTE — PROVIDER CONTACT NOTE (CRITICAL VALUE NOTIFICATION) - BACKGROUND
On heparin drip for PE @ 18ml/hr. PTT drawn in other arm from where heparin is running.
Patient admitted for pulmonary embolism. PMH of HTN, HLD, DM2, and DURAN

## 2023-03-21 NOTE — DISCHARGE NOTE PROVIDER - NSDCMRMEDTOKEN_GEN_ALL_CORE_FT
DilTIAZem Hydrochloride  mg/24 hours oral capsule, extended release: 1 cap(s) orally once a day  Eliquis Starter Pack for Treatment of DVT and PE 5 mg oral tablet: 1 tab(s) orally 2 times a day, starter pack as directed for PE, 1st dose given 3/21    ****PRICE CHECK ONLY****   etodolac 400 mg oral tablet: 1 tab(s) orally 2 times a day  famotidine 20 mg oral tablet: 1 tab(s) orally once a day  folic acid 0.4 mg oral tablet: 1 tab(s) orally once a day  Klor-Con M20 oral tablet, extended release: 1 tab(s) orally once a day  lansoprazole 30 mg oral delayed release capsule: 1 cap(s) orally once a day  Lasix 40 mg oral tablet: 1 tab(s) orally once a day  metFORMIN 500 mg oral tablet, extended release: 1 tab(s) orally once a day  olmesartan 40 mg oral tablet: 1 tab(s) orally once a day  Probiotic: 1 cap(s) orally once a day  spironolactone 25 mg oral tablet: 1 tab(s) orally once a day   apixaban 5 mg oral tablet: 1 tab(s) orally 2 times a day  bumetanide 1 mg oral tablet: 1 tab(s) orally once a day  famotidine 20 mg oral tablet: 1 tab(s) orally once a day  folic acid 0.4 mg oral tablet: 1 tab(s) orally once a day  lansoprazole 30 mg oral delayed release capsule: 1 cap(s) orally once a day  metFORMIN 500 mg oral tablet, extended release: 1 tab(s) orally once a day  Probiotic: 1 cap(s) orally once a day  spironolactone 25 mg oral tablet: 1 tab(s) orally once a day   apixaban 5 mg oral tablet: 1 tab(s) orally 2 times a day  bumetanide 1 mg oral tablet: 1 tab(s) orally once a day  famotidine 20 mg oral tablet: 1 tab(s) orally once a day  folic acid 0.4 mg oral tablet: 1 tab(s) orally once a day  metFORMIN 500 mg oral tablet, extended release: 1 tab(s) orally once a day  Probiotic: 1 cap(s) orally once a day  spironolactone 25 mg oral tablet: 1 tab(s) orally once a day

## 2023-03-21 NOTE — DISCHARGE NOTE PROVIDER - NSDCCPCAREPLAN_GEN_ALL_CORE_FT
PRINCIPAL DISCHARGE DIAGNOSIS  Diagnosis: Bilateral pulmonary embolism  Assessment and Plan of Treatment: Continue Eliquis as prescribed.  F/U with your lung doctor and PMD in 1 week      SECONDARY DISCHARGE DIAGNOSES  Diagnosis: Hyperlipidemia  Assessment and Plan of Treatment: Low fat diet, exercise daily and continue current medications. Follow up with primary care physician and cardiologist for management.      Diagnosis: Chronic atrial fibrillation  Assessment and Plan of Treatment: Please take your medications as prescribed.  Continue to take your blood thinner as prescribed and follow with your physician to monitor.  Low fat diet, reduce caffeine intake, and exercise at least 30 minutes daily.      Diagnosis: Hypertension  Assessment and Plan of Treatment: Low sodium and fat diet, continue anti-hypertensive medications, and follow up with primary care physician.    Diagnosis: Diabetes mellitus  Assessment and Plan of Treatment: Monitor finger sticks pre-meal and bedtime, low salt, fat and carbohydrate diet, minimize glucose intake.  Exercise daily for at least 30 minutes and weight loss.  Follow up with primary care physician and endocrinologist for routine Hemoglobin A1C checks and management.  Follow up with your ophthalmologist for routine yearly vision exams.

## 2023-03-21 NOTE — DISCHARGE NOTE PROVIDER - DETAILS OF MALNUTRITION DIAGNOSIS/DIAGNOSES
This patient has been assessed with a concern for Malnutrition and was treated during this hospitalization for the following Nutrition diagnosis/diagnoses:     -  03/24/2023: Morbid obesity (BMI > 40)

## 2023-03-21 NOTE — DISCHARGE NOTE PROVIDER - PROVIDER TOKENS
PROVIDER:[TOKEN:[[id number]],FOLLOWUP:[2 weeks],ESTABLISHEDPATIENT:[T]],PROVIDER:[TOKEN:[[id number]],FOLLOWUP:[1 week],ESTABLISHEDPATIENT:[T]],PROVIDER:[TOKEN:[[id number]],FOLLOWUP:[1 week],ESTABLISHEDPATIENT:[T]]

## 2023-03-21 NOTE — PROGRESS NOTE ADULT - SUBJECTIVE AND OBJECTIVE BOX
PULMONARY PROGRESS NOTE    TORI WOOTEN  MRN-[id number]    Patient is a 77y old  Female who presents with a chief complaint of sob (20 Mar 2023 11:33)      HPI:  doing ok  on nc  laying in bed  no complaints    MEDICATIONS  (STANDING):  apixaban 10 milliGRAM(s) Oral two times a day  dextrose 5%. 1000 milliLiter(s) (50 mL/Hr) IV Continuous <Continuous>  dextrose 5%. 1000 milliLiter(s) (100 mL/Hr) IV Continuous <Continuous>  dextrose 50% Injectable 25 Gram(s) IV Push once  dextrose 50% Injectable 12.5 Gram(s) IV Push once  dextrose 50% Injectable 25 Gram(s) IV Push once  famotidine    Tablet 20 milliGRAM(s) Oral daily  folic acid 1 milliGRAM(s) Oral daily  furosemide    Tablet 40 milliGRAM(s) Oral daily  glucagon  Injectable 1 milliGRAM(s) IntraMuscular once  insulin lispro (ADMELOG) corrective regimen sliding scale   SubCutaneous three times a day before meals  insulin lispro (ADMELOG) corrective regimen sliding scale   SubCutaneous at bedtime  lansoprazole Capsule 30 milliGRAM(s) Oral daily    MEDICATIONS  (PRN):  acetaminophen     Tablet .. 650 milliGRAM(s) Oral every 6 hours PRN Temp greater or equal to 38C (100.4F), Mild Pain (1 - 3)  dextrose Oral Gel 15 Gram(s) Oral once PRN Blood Glucose LESS THAN 70 milliGRAM(s)/deciliter        EXAM:  Vital Signs Last 24 Hrs  T(C): 36.9 (21 Mar 2023 08:45), Max: 37.1 (21 Mar 2023 00:50)  T(F): 98.4 (21 Mar 2023 08:45), Max: 98.8 (21 Mar 2023 00:50)  HR: 83 (21 Mar 2023 08:56) (83 - 103)  BP: 135/63 (21 Mar 2023 08:45) (111/83 - 135/63)  BP(mean): --  RR: 18 (21 Mar 2023 08:45) (18 - 18)  SpO2: 98% (21 Mar 2023 08:56) (97% - 100%)    Parameters below as of 21 Mar 2023 08:45  Patient On (Oxygen Delivery Method): nasal cannula  O2 Flow (L/min): 2        GENERAL: The patient is awake and alert in no apparent distress.     LUNGS: Clear to auscultation without wheezing, rales or rhonchi; respirations unlabored                             10.2   7.15  )-----------( 258      ( 20 Mar 2023 06:12 )             35.0   03-21    135  |  103  |  33<H>  ----------------------------<  130<H>  4.6   |  22  |  1.62<H>    Ca    10.2      21 Mar 2023 03:07  Phos  4.2     03-21  Mg     1.90     03-21         < from: CT Angio Chest PE Protocol w/ IV Cont (03.18.23 @ 01:12) >    ACC: [id number] EXAM:  CT ANGIO CHEST PULM [facility]   ORDERED BY: YAYO MCLEAN     PROCEDURE DATE:  03/18/2023          INTERPRETATION:  CLINICAL INFORMATION: Chest pain. Evaluate for pulmonary   embolism.    COMPARISON: CTA chest 8/8/2020    CONTRAST/COMPLICATIONS:  IV Contrast: Omnipaque 350  70 cc administered   30 cc discarded  Oral Contrast: NONE  Complications: None reported at time of study completion    PROCEDURE:  CT Angiography of the Chest.  Sagittal and coronal reformats were performed as well as 3D (MIP)   reconstructions.    FINDINGS:    LUNGS AND AIRWAYS: Patent central airways.  Left lower lobe calcified   granuloma. A 4 mm nodule in the left lower lobe. Bibasilar subsegmental   atelectasis.  PLEURA: No pleural effusion.  MEDIASTINUM AND JAVIER: No lymphadenopathy and several calcified   mediastinal and hilar lymph nodes.  VESSELS: Acute pulmonary emboli in the right interlobar artery and right   upper, middle, and lower lobar branches. Acute pulmonary emboli within   the left upper and lower lobar branches.  HEART: Increased size of the right ventricle as compared to the left   ventricle, with leftward convexity of the interventricular septum. No   pericardial effusion.  CHEST WALL AND LOWER NECK: Redemonstrated nodular thyroid.  VISUALIZED UPPER ABDOMEN: Small hiatal hernia. Scattered splenic   granulomata. Left renal cyst.  BONES: Chronic fracture deformities of the left fourth through sixth   ribs. Degenerative changes.    IMPRESSION:  Acute bilateral lobar branch pulmonary emboli as described above, with   suggestion of right heart strain.    MD Martha was informed of these results by Melinda Pinon MD with   read back at about 2:18 AM on 3/18/2023    --- End of Report ---          MELINDA PINON MD; Resident Radiologist  This document has been electronically signed.  ALYSON LEE MD; Attending Radiologist  This document has been electronically signed. Mar 18 2023  3:44AM    < end of copied text >  < from: US Duplex Venous Lower Ext Complete, Bilateral (03.19.23 @ 10:44) >    ACC: [id number] EXAM:  US DPLX LWR EXT VEINS COMPL BI   ORDERED BY: TAMMY KITCHEN     PROCEDURE DATE:  03/19/2023          INTERPRETATION:  CLINICAL INFORMATION: Dyspnea on exertion    COMPARISON: Lower extremity Doppler ultrasound 8/8/2020    TECHNIQUE: Duplex sonography of the BILATERAL LOWER extremity veins with   color and spectral Doppler, with and without compression.    FINDINGS:    RIGHT:  Normal compressibility of the RIGHT common femoral and femoral veins.   Thrombus within the right popliteal vein.  Doppler examination shows normal spontaneous and phasic flow in the   common femoral and femoral veins, and mildly decreased flow within the   right popliteal vein.  The right posterior tibial and gastrocnemius veins are patent. Thrombus  within the right peroneal vein. The soleal vein is not visualized.    LEFT:  Normal compressibility of the LEFT common femoral and femoral veins.   Thrombus within the left popliteal vein.  Doppler examination shows normal spontaneous and phasic flow in the   common femoral and femoral veins, and mildly decreased flow within the   left popliteal vein.  The left posterior tibial vein and gastrocnemius veins are patent.   Thrombus within the left peroneal vein. The soleal vein is not visualized.    IMPRESSION:  Acute Deep venous thrombosis in the BILATERAL popliteal and peroneal   veins.    Findings were discussed with provider Avery by Dr. Gomez on 3/19/2023   11:04 AM with read back confirmation.    --- End of Report ---          ONUR GOMEZ MD; Resident Radiologist  This document has been electronically signed.  SHEREE LEON MD; Attending Radiologist  This document has been electronically signed. Mar 19 2023 11:35AM    < end of copied text >  < from: Transthoracic Echocardiogram (03.20.23 @ 09:01) >  ------------------------------------------------------------------------  CONCLUSIONS:  1. Calcified aortic valve with normal opening. Mild aortic  regurgitation.  2. Normal left ventricular internal dimensions and wall  thicknesses.  3. Normal left ventricular systolic function. No segmental  wall motion abnormalities.  4. Normal right ventricular size and function.  ------------------------------------------------------------------------  Confirmed on  3/20/2023 - 11:33:22 by Bennie Fritz M.D. RPVI  ------------------------------------------------------------------------    < end of copied text >      PROBLEM LIST:  77y Female with HEALTH ISSUES - PROBLEM Dx:  Acute pulmonary embolism    Sleep apnea    Diabetes mellitus    Diabetes mellitus    Hypertension    Hyperlipidemia    Chronic atrial fibrillation    PAF (paroxysmal atrial fibrillation)    JACKLYN (acute kidney injury)    Suspected deep vein thrombosis (DVT)    Pulmonary thromboembolism              RECS:  full AC  oob/chair  close f/u with DR Valenzuela    Please call with any questions.    Gilma Luque MD  [facility] Pulmonary/Sleep Medicine  [phone]   PULMONARY PROGRESS NOTE    TORI WOOTEN  MRN-[id number]    Patient is a 77y old  Female who presents with a chief complaint of sob (20 Mar 2023 11:33)      HPI:  doing ok  on nc  laying in bed  no complaints    MEDICATIONS  (STANDING):  apixaban 10 milliGRAM(s) Oral two times a day  dextrose 5%. 1000 milliLiter(s) (50 mL/Hr) IV Continuous <Continuous>  dextrose 5%. 1000 milliLiter(s) (100 mL/Hr) IV Continuous <Continuous>  dextrose 50% Injectable 25 Gram(s) IV Push once  dextrose 50% Injectable 12.5 Gram(s) IV Push once  dextrose 50% Injectable 25 Gram(s) IV Push once  famotidine    Tablet 20 milliGRAM(s) Oral daily  folic acid 1 milliGRAM(s) Oral daily  furosemide    Tablet 40 milliGRAM(s) Oral daily  glucagon  Injectable 1 milliGRAM(s) IntraMuscular once  insulin lispro (ADMELOG) corrective regimen sliding scale   SubCutaneous three times a day before meals  insulin lispro (ADMELOG) corrective regimen sliding scale   SubCutaneous at bedtime  lansoprazole Capsule 30 milliGRAM(s) Oral daily    MEDICATIONS  (PRN):  acetaminophen     Tablet .. 650 milliGRAM(s) Oral every 6 hours PRN Temp greater or equal to 38C (100.4F), Mild Pain (1 - 3)  dextrose Oral Gel 15 Gram(s) Oral once PRN Blood Glucose LESS THAN 70 milliGRAM(s)/deciliter        EXAM:  Vital Signs Last 24 Hrs  T(C): 36.9 (21 Mar 2023 08:45), Max: 37.1 (21 Mar 2023 00:50)  T(F): 98.4 (21 Mar 2023 08:45), Max: 98.8 (21 Mar 2023 00:50)  HR: 83 (21 Mar 2023 08:56) (83 - 103)  BP: 135/63 (21 Mar 2023 08:45) (111/83 - 135/63)  BP(mean): --  RR: 18 (21 Mar 2023 08:45) (18 - 18)  SpO2: 98% (21 Mar 2023 08:56) (97% - 100%)    Parameters below as of 21 Mar 2023 08:45  Patient On (Oxygen Delivery Method): nasal cannula  O2 Flow (L/min): 2        GENERAL: The patient is awake and alert in no apparent distress.     LUNGS: Clear to auscultation without wheezing, rales or rhonchi; respirations unlabored                             10.2   7.15  )-----------( 258      ( 20 Mar 2023 06:12 )             35.0   03-21    135  |  103  |  33<H>  ----------------------------<  130<H>  4.6   |  22  |  1.62<H>    Ca    10.2      21 Mar 2023 03:07  Phos  4.2     03-21  Mg     1.90     03-21         < from: CT Angio Chest PE Protocol w/ IV Cont (03.18.23 @ 01:12) >    ACC: [id number] EXAM:  CT ANGIO CHEST PULM [facility]   ORDERED BY: YAYO MCLEAN     PROCEDURE DATE:  03/18/2023          INTERPRETATION:  CLINICAL INFORMATION: Chest pain. Evaluate for pulmonary   embolism.    COMPARISON: CTA chest 8/8/2020    CONTRAST/COMPLICATIONS:  IV Contrast: Omnipaque 350  70 cc administered   30 cc discarded  Oral Contrast: NONE  Complications: None reported at time of study completion    PROCEDURE:  CT Angiography of the Chest.  Sagittal and coronal reformats were performed as well as 3D (MIP)   reconstructions.    FINDINGS:    LUNGS AND AIRWAYS: Patent central airways.  Left lower lobe calcified   granuloma. A 4 mm nodule in the left lower lobe. Bibasilar subsegmental   atelectasis.  PLEURA: No pleural effusion.  MEDIASTINUM AND JAVIER: No lymphadenopathy and several calcified   mediastinal and hilar lymph nodes.  VESSELS: Acute pulmonary emboli in the right interlobar artery and right   upper, middle, and lower lobar branches. Acute pulmonary emboli within   the left upper and lower lobar branches.  HEART: Increased size of the right ventricle as compared to the left   ventricle, with leftward convexity of the interventricular septum. No   pericardial effusion.  CHEST WALL AND LOWER NECK: Redemonstrated nodular thyroid.  VISUALIZED UPPER ABDOMEN: Small hiatal hernia. Scattered splenic   granulomata. Left renal cyst.  BONES: Chronic fracture deformities of the left fourth through sixth   ribs. Degenerative changes.    IMPRESSION:  Acute bilateral lobar branch pulmonary emboli as described above, with   suggestion of right heart strain.    MD Martha was informed of these results by Melinda Pinon MD with   read back at about 2:18 AM on 3/18/2023    --- End of Report ---          MELINDA PINON MD; Resident Radiologist  This document has been electronically signed.  ALYSON LEE MD; Attending Radiologist  This document has been electronically signed. Mar 18 2023  3:44AM    < end of copied text >  < from: US Duplex Venous Lower Ext Complete, Bilateral (03.19.23 @ 10:44) >    ACC: [id number] EXAM:  US DPLX LWR EXT VEINS COMPL BI   ORDERED BY: TAMMY KITCHEN     PROCEDURE DATE:  03/19/2023          INTERPRETATION:  CLINICAL INFORMATION: Dyspnea on exertion    COMPARISON: Lower extremity Doppler ultrasound 8/8/2020    TECHNIQUE: Duplex sonography of the BILATERAL LOWER extremity veins with   color and spectral Doppler, with and without compression.    FINDINGS:    RIGHT:  Normal compressibility of the RIGHT common femoral and femoral veins.   Thrombus within the right popliteal vein.  Doppler examination shows normal spontaneous and phasic flow in the   common femoral and femoral veins, and mildly decreased flow within the   right popliteal vein.  The right posterior tibial and gastrocnemius veins are patent. Thrombus  within the right peroneal vein. The soleal vein is not visualized.    LEFT:  Normal compressibility of the LEFT common femoral and femoral veins.   Thrombus within the left popliteal vein.  Doppler examination shows normal spontaneous and phasic flow in the   common femoral and femoral veins, and mildly decreased flow within the   left popliteal vein.  The left posterior tibial vein and gastrocnemius veins are patent.   Thrombus within the left peroneal vein. The soleal vein is not visualized.    IMPRESSION:  Acute Deep venous thrombosis in the BILATERAL popliteal and peroneal   veins.    Findings were discussed with provider Avery by Dr. Gomez on 3/19/2023   11:04 AM with read back confirmation.    --- End of Report ---          ONUR GOMEZ MD; Resident Radiologist  This document has been electronically signed.  SHEREE LEON MD; Attending Radiologist  This document has been electronically signed. Mar 19 2023 11:35AM    < end of copied text >  < from: Transthoracic Echocardiogram (03.20.23 @ 09:01) >  ------------------------------------------------------------------------  CONCLUSIONS:  1. Calcified aortic valve with normal opening. Mild aortic  regurgitation.  2. Normal left ventricular internal dimensions and wall  thicknesses.  3. Normal left ventricular systolic function. No segmental  wall motion abnormalities.  4. Normal right ventricular size and function.  ------------------------------------------------------------------------  Confirmed on  3/20/2023 - 11:33:22 by Bennie Fritz M.D. RPVI  ------------------------------------------------------------------------    < end of copied text >      PROBLEM LIST:  77y Female with HEALTH ISSUES - PROBLEM Dx:  Acute pulmonary embolism    Sleep apnea    Diabetes mellitus    Diabetes mellitus    Hypertension    Hyperlipidemia    Chronic atrial fibrillation    PAF (paroxysmal atrial fibrillation)    JACKLYN (acute kidney injury)    Suspected deep vein thrombosis (DVT)    Pulmonary thromboembolism              RECS:  full AC  cpap with sleep  wean O2  incentive divya  oob/chair  close f/u with DR Valenzuela    Please call with any questions.    Gilma Luque MD  [facility] Pulmonary/Sleep Medicine  [phone]

## 2023-03-21 NOTE — PROVIDER CONTACT NOTE (CRITICAL VALUE NOTIFICATION) - ACTION/TREATMENT ORDERED:
follow heparin nomogram
MLP paged and made aware, endorsed to night shift RN, heparin nomogram followed
Provider made aware, heparin nomogram followed
Held heparin drip for 60 minutes. Will restart based off nonogram.

## 2023-03-21 NOTE — PROGRESS NOTE ADULT - ASSESSMENT
77F PMHx of paroxysmal Afib not on AC, remote hx of b/l knee replacement, and HTN, who is admitted for b/l lobar branch PE, with evidence of R heart strain on CT angio, consistent with submassive PE. Found also to have b/l LE DVT's. Treating with systemic AC.    #Submassive PE - intermediate-high risk  - c/w heparin gtt AC  - Elevated proBNP and evidence of RV strain on CT  - proBNP 3049 -> 1368 -> 614  - trop-hs negative x3  - TTE with no evidence of RV strain/dysfunction  - US duplex LE b/l 3/19 w/ acute DVT in b/l popliteal and peroneal veins  - Close monitoring of hemodynamics, immediately notify PERT w/ any changes in hemodynamics  - Age appropriate malignancy screening warranted    Travis Murguia MD  Cardiology Fellow - PGY4    Please check [email or web address]: [email or web address]" for cardiology service schedule and contact information.

## 2023-03-21 NOTE — DISCHARGE NOTE PROVIDER - HOSPITAL COURSE
77F PMH HTN, HLD, GERD, DM 2, DURAN, paroxysmal A-fib not on anticoagulation, remote history of bilateral knee replacements presents to the ED admitted with Bilateral Pulmonary Embolism with unknown provocation confirmed by CT Chest .     Bilateral ecchymotic/edematous/TTP RUE>LUE   3/22 - VA Duplex b/l UE w/o dvt, likely d/t IV infiltration and aPTT blood draws    PE w/L heart strain/+B/L LE DVT - Cards (signed off 3/22)/Pulm following  -now on RA  - seen by cards & evaluated for thrombectomy - no plan for acute intervention at present  - Hep gtt for AC d/t renal fxn, transitioned to eliquis 3/21 (hold if any further surgical intervention)  - TTE - preserved ef, nl lv fxn, nwma  - Close monitoring of hemodynamics, immediately notify PERT w/ any changes in hemodynamics    Thyroid nodules  **Incidental finding on VA duplex: Multiple structures of mixed echogenicity noted within left thyroid lobe, largest complex nodule measuring about  1.1 x 1.0 x 1.2 cm  - TFTs 0.16>0.12>0.18  - asked Dr. Gonzalez if she wants endocrine consult, no reply from her as of yet, please f/u if needed    +Afib  - Hold diltiazem for now per cards  - Cardio Dr Winn following    +DURAN - CPAP qHS     JACKLYN on CKD - Nephro following  3/22: Changed PO lasix to PO bumex 1 mg PO daily and restarted home aldactone 25 mg daily    PT: Home with Home PT  - eliquis starter pack 104$ copay d/t her deductible, 1st month free vivo, pt willing to pay

## 2023-03-21 NOTE — CONSULT NOTE ADULT - ASSESSMENT
77F PMH HTN, HLD, GERD, DM 2, DURAN, paroxysmal A-fib not on anticoagulation, remote history of bilateral knee replacements presents to the ED admitted with Bilateral Pulmonary Embolism with unknown provocation confirmed by CT Chest .  Patient is not a candidate for CCU level of care.
77y Female with history of long standing HTN and DM presents with SOB. Nephrology consulted for elevated Scr.    1) CKD-3b: Baseline Scr unknown but suspect patient with underlying CKD-3b in setting of long standing HTN and DM. Will try to obtain baseline Scr from PMD. Check UA, spot urine TP/CR and renal US. Avoid nephrotoxins. Monitor electrolytes.    2) HTN with CKD: BP controlled. Holding home olmesartan. Monitor BP.    3) LE edema: Continue with lasix 40 mg PO daily. Holding home aldactone 25 mg daily. TTE with normal LVSF. Monitor UO.    4) PE: As per primary team.        [facility] NEPHROLOGY  Eric Acosta M.D.  Mitch Tatum D.O.  Paty Lama M.D.  Yanci Mirza, MSN, ANP-C    Telephone: [phone]  Facsimile: [phone]    [address] [place] [address]

## 2023-03-22 LAB
ANION GAP SERPL CALC-SCNC: 10 MMOL/L — SIGNIFICANT CHANGE UP (ref 7–14)
BUN SERPL-MCNC: 34 MG/DL — HIGH (ref 7–23)
CALCIUM SERPL-MCNC: 10.1 MG/DL — SIGNIFICANT CHANGE UP (ref 8.4–10.5)
CHLORIDE SERPL-SCNC: 103 MMOL/L — SIGNIFICANT CHANGE UP (ref 98–107)
CO2 SERPL-SCNC: 23 MMOL/L — SIGNIFICANT CHANGE UP (ref 22–31)
CREAT SERPL-MCNC: 1.43 MG/DL — HIGH (ref 0.5–1.3)
EGFR: 37 ML/MIN/1.73M2 — LOW
GLUCOSE BLDC GLUCOMTR-MCNC: 116 MG/DL — HIGH (ref 70–99)
GLUCOSE BLDC GLUCOMTR-MCNC: 118 MG/DL — HIGH (ref 70–99)
GLUCOSE BLDC GLUCOMTR-MCNC: 131 MG/DL — HIGH (ref 70–99)
GLUCOSE BLDC GLUCOMTR-MCNC: 153 MG/DL — HIGH (ref 70–99)
GLUCOSE BLDC GLUCOMTR-MCNC: 154 MG/DL — HIGH (ref 70–99)
GLUCOSE BLDC GLUCOMTR-MCNC: 162 MG/DL — HIGH (ref 70–99)
GLUCOSE SERPL-MCNC: 115 MG/DL — HIGH (ref 70–99)
HCT VFR BLD CALC: 29.5 % — LOW (ref 34.5–45)
HCT VFR BLD CALC: 32.5 % — LOW (ref 34.5–45)
HGB BLD-MCNC: 8.7 G/DL — LOW (ref 11.5–15.5)
HGB BLD-MCNC: 9.7 G/DL — LOW (ref 11.5–15.5)
MAGNESIUM SERPL-MCNC: 2 MG/DL — SIGNIFICANT CHANGE UP (ref 1.6–2.6)
MCHC RBC-ENTMCNC: 26.6 PG — LOW (ref 27–34)
MCHC RBC-ENTMCNC: 26.9 PG — LOW (ref 27–34)
MCHC RBC-ENTMCNC: 29.5 GM/DL — LOW (ref 32–36)
MCHC RBC-ENTMCNC: 29.8 GM/DL — LOW (ref 32–36)
MCV RBC AUTO: 90.2 FL — SIGNIFICANT CHANGE UP (ref 80–100)
MCV RBC AUTO: 90.3 FL — SIGNIFICANT CHANGE UP (ref 80–100)
NRBC # BLD: 0 /100 WBCS — SIGNIFICANT CHANGE UP (ref 0–0)
NRBC # BLD: 0 /100 WBCS — SIGNIFICANT CHANGE UP (ref 0–0)
NRBC # FLD: 0 K/UL — SIGNIFICANT CHANGE UP (ref 0–0)
NRBC # FLD: 0 K/UL — SIGNIFICANT CHANGE UP (ref 0–0)
PHOSPHATE SERPL-MCNC: 3.5 MG/DL — SIGNIFICANT CHANGE UP (ref 2.5–4.5)
PLATELET # BLD AUTO: 255 K/UL — SIGNIFICANT CHANGE UP (ref 150–400)
PLATELET # BLD AUTO: 287 K/UL — SIGNIFICANT CHANGE UP (ref 150–400)
POTASSIUM SERPL-MCNC: 4.7 MMOL/L — SIGNIFICANT CHANGE UP (ref 3.5–5.3)
POTASSIUM SERPL-SCNC: 4.7 MMOL/L — SIGNIFICANT CHANGE UP (ref 3.5–5.3)
RBC # BLD: 3.27 M/UL — LOW (ref 3.8–5.2)
RBC # BLD: 3.6 M/UL — LOW (ref 3.8–5.2)
RBC # FLD: 15.9 % — HIGH (ref 10.3–14.5)
RBC # FLD: 16 % — HIGH (ref 10.3–14.5)
SODIUM SERPL-SCNC: 136 MMOL/L — SIGNIFICANT CHANGE UP (ref 135–145)
WBC # BLD: 6.9 K/UL — SIGNIFICANT CHANGE UP (ref 3.8–10.5)
WBC # BLD: 7.19 K/UL — SIGNIFICANT CHANGE UP (ref 3.8–10.5)
WBC # FLD AUTO: 6.9 K/UL — SIGNIFICANT CHANGE UP (ref 3.8–10.5)
WBC # FLD AUTO: 7.19 K/UL — SIGNIFICANT CHANGE UP (ref 3.8–10.5)

## 2023-03-22 PROCEDURE — 99232 SBSQ HOSP IP/OBS MODERATE 35: CPT

## 2023-03-22 PROCEDURE — 93970 EXTREMITY STUDY: CPT | Mod: 26

## 2023-03-22 RX ORDER — SPIRONOLACTONE 25 MG/1
25 TABLET, FILM COATED ORAL DAILY
Refills: 0 | Status: DISCONTINUED | OUTPATIENT
Start: 2023-03-22 | End: 2023-03-28

## 2023-03-22 RX ORDER — BUMETANIDE 0.25 MG/ML
1 INJECTION INTRAMUSCULAR; INTRAVENOUS DAILY
Refills: 0 | Status: DISCONTINUED | OUTPATIENT
Start: 2023-03-23 | End: 2023-03-28

## 2023-03-22 RX ADMIN — LANSOPRAZOLE 30 MILLIGRAM(S): 15 CAPSULE, DELAYED RELEASE ORAL at 17:52

## 2023-03-22 RX ADMIN — Medication 100 MILLIGRAM(S): at 21:56

## 2023-03-22 RX ADMIN — Medication 1 MILLIGRAM(S): at 12:20

## 2023-03-22 RX ADMIN — MAGNESIUM OXIDE 400 MG ORAL TABLET 400 MILLIGRAM(S): 241.3 TABLET ORAL at 17:51

## 2023-03-22 RX ADMIN — APIXABAN 10 MILLIGRAM(S): 2.5 TABLET, FILM COATED ORAL at 17:51

## 2023-03-22 RX ADMIN — Medication 650 MILLIGRAM(S): at 08:58

## 2023-03-22 RX ADMIN — Medication 1: at 13:17

## 2023-03-22 RX ADMIN — Medication 650 MILLIGRAM(S): at 07:58

## 2023-03-22 RX ADMIN — Medication 40 MILLIGRAM(S): at 05:54

## 2023-03-22 RX ADMIN — FAMOTIDINE 20 MILLIGRAM(S): 10 INJECTION INTRAVENOUS at 12:19

## 2023-03-22 RX ADMIN — MAGNESIUM OXIDE 400 MG ORAL TABLET 400 MILLIGRAM(S): 241.3 TABLET ORAL at 12:21

## 2023-03-22 RX ADMIN — Medication 650 MILLIGRAM(S): at 21:56

## 2023-03-22 RX ADMIN — Medication 650 MILLIGRAM(S): at 23:00

## 2023-03-22 RX ADMIN — SPIRONOLACTONE 25 MILLIGRAM(S): 25 TABLET, FILM COATED ORAL at 18:08

## 2023-03-22 RX ADMIN — APIXABAN 10 MILLIGRAM(S): 2.5 TABLET, FILM COATED ORAL at 05:54

## 2023-03-22 RX ADMIN — MAGNESIUM OXIDE 400 MG ORAL TABLET 400 MILLIGRAM(S): 241.3 TABLET ORAL at 10:41

## 2023-03-22 NOTE — PROGRESS NOTE ADULT - SUBJECTIVE AND OBJECTIVE BOX
[facility] NEPHROLOGY- PROGRESS NOTE    77y Female with history of long standing HTN and DM presents with SOB. Nephrology consulted for elevated Scr.    REVIEW OF SYSTEMS:  Gen: no fevers  Cards: no chest pain  Resp: + dyspnea  GI: no nausea or vomiting or diarrhea  Vascular: + LE edema    codeine (Other)  penicillin (Blisters)      Hospital Medications: Medications reviewed    VITALS:  T(F): 99.1 (23 @ 10:30), Max: 99.1 (23 @ 10:30)  HR: 97 (23 @ 11:41)  BP: 126/68 (23 @ 11:41)  RR: 18 (23 @ 10:30)  SpO2: 100% (23 @ 11:41)  Wt(kg): --  Height (cm): 154.9 ( @ 10:16)  Weight (kg): 123.2 ( @ 02:35)  BMI (kg/m2): 51.3 ( @ 10:16)  BSA (m2): 2.15 ( @ 10:16)     @ 07:01  -   @ 07:00  --------------------------------------------------------  IN: 0 mL / OUT: 400 mL / NET: -400 mL        PHYSICAL EXAM:    Gen: NAD, calm  Cards: RRR, +S1/S2, no M/G/R  Resp: CTA B/L  GI: soft, NT/ND, NABS  Vascular: trace LE edema B/L    LABS:      136  |  103  |  34<H>  ----------------------------<  115<H>  4.7   |  23  |  1.43<H>    Ca    10.1      22 Mar 2023 06:05  Phos  3.5       Mg     2.00           Creatinine Trend: 1.43 <--, 1.62 <--, 1.57 <--, 1.58 <--, 1.54 <--, 1.58 <--, 1.56 <--, 1.59 <--                        8.7    7.19  )-----------( 255      ( 22 Mar 2023 06:05 )             29.5     Urine Studies:  Urinalysis Basic - ( 21 Mar 2023 22:05 )    Color: Light Yellow / Appearance: Clear / S.019 / pH:   Gluc:  / Ketone: Negative  / Bili: Negative / Urobili: <2 mg/dL   Blood:  / Protein: Trace / Nitrite: Negative   Leuk Esterase: Negative / RBC: 1 /HPF / WBC 1 /HPF   Sq Epi:  / Non Sq Epi:  / Bacteria:       Creatinine, Random Urine: 157 mg/dL ( @ 22:05)  Protein/Creatinine Ratio Calculation: 0.1 Ratio ( @ 22:05)      RADIOLOGY & ADDITIONAL STUDIES:  < from: US Kidney and Bladder (23 @ 17:42) >  IMPRESSION:  No hydronephrosis.    Bilateral simple appearing renal cysts.        --- End of Report ---    < end of copied text >

## 2023-03-22 NOTE — PROGRESS NOTE ADULT - SUBJECTIVE AND OBJECTIVE BOX
SUBJECTIVE / OVERNIGHT EVENTS:pt seen and examined  23     MEDICATIONS  (STANDING):  apixaban 10 milliGRAM(s) Oral every 12 hours  dextrose 5%. 1000 milliLiter(s) (50 mL/Hr) IV Continuous <Continuous>  dextrose 5%. 1000 milliLiter(s) (100 mL/Hr) IV Continuous <Continuous>  dextrose 50% Injectable 25 Gram(s) IV Push once  dextrose 50% Injectable 12.5 Gram(s) IV Push once  dextrose 50% Injectable 25 Gram(s) IV Push once  famotidine    Tablet 20 milliGRAM(s) Oral daily  folic acid 1 milliGRAM(s) Oral daily  glucagon  Injectable 1 milliGRAM(s) IntraMuscular once  insulin lispro (ADMELOG) corrective regimen sliding scale   SubCutaneous three times a day before meals  insulin lispro (ADMELOG) corrective regimen sliding scale   SubCutaneous at bedtime  lansoprazole Capsule 30 milliGRAM(s) Oral daily  magnesium oxide 400 milliGRAM(s) Oral three times a day with meals  spironolactone 25 milliGRAM(s) Oral daily    MEDICATIONS  (PRN):  acetaminophen     Tablet .. 650 milliGRAM(s) Oral every 6 hours PRN Temp greater or equal to 38C (100.4F), Mild Pain (1 - 3)  acetaminophen   IVPB .. 1000 milliGRAM(s) IV Intermittent once PRN Severe Pain (7 - 10)  cyclobenzaprine 5 milliGRAM(s) Oral every 12 hours PRN Muscle Spasm  dextrose Oral Gel 15 Gram(s) Oral once PRN Blood Glucose LESS THAN 70 milliGRAM(s)/deciliter  guaiFENesin Oral Liquid (Sugar-Free) 100 milliGRAM(s) Oral every 6 hours PRN Cough    Vital Signs Last 24 Hrs  T(C): 37 (23 @ 21:48), Max: 37.3 (23 @ 10:30)  T(F): 98.6 (23 @ 21:48), Max: 99.1 (23 @ 10:30)  HR: 100 (23 @ 21:48) (85 - 100)  BP: 128/68 (23 @ 21:48) (121/67 - 130/76)  BP(mean): --  RR: 18 (23 @ 21:48) (17 - 18)  SpO2: 97% (23 @ 21:48) (97% - 100%)          Constitutional: No fever, fatigue  Skin: No rash.  Eyes: No recent vision problems or eye pain.  ENT: No congestion, ear pain, or sore throat.  Cardiovascular: No chest pain or palpation.  Respiratory: No cough, shortness of breath, congestion, or wheezing.  Gastrointestinal: No abdominal pain, nausea, vomiting, or diarrhea.  Genitourinary: No dysuria.  Musculoskeletal: No joint swelling.  Neurologic: No headache.    PHYSICAL EXAM:  GENERAL: NAD  EYES: EOMI, PERRLA  NECK: Supple, No JVD  CHEST/LUNG: dec breath sounds at bases  HEART:  S1 , S2 +  ABDOMEN: soft, bs+  EXTREMITIES:  edema+  NEUROLOGY:alert awake oriented       LABS:      136  |  103  |  34<H>  ----------------------------<  115<H>  4.7   |  23  |  1.43<H>    Ca    10.1      22 Mar 2023 06:05  Phos  3.5       Mg     2.00           Creatinine Trend: 1.43 <--, 1.62 <--, 1.57 <--, 1.58 <--, 1.54 <--, 1.58 <--, 1.56 <--, 1.59 <--                        9.7    6.90  )-----------( 287      ( 22 Mar 2023 12:35 )             32.5     Urine Studies:  Urinalysis Basic - ( 21 Mar 2023 22:05 )    Color: Light Yellow / Appearance: Clear / S.019 / pH:   Gluc:  / Ketone: Negative  / Bili: Negative / Urobili: <2 mg/dL   Blood:  / Protein: Trace / Nitrite: Negative   Leuk Esterase: Negative / RBC: 1 /HPF / WBC 1 /HPF   Sq Epi:  / Non Sq Epi:  / Bacteria:       Creatinine, Random Urine: 157 mg/dL ( @ 22:05)  Protein/Creatinine Ratio Calculation: 0.1 Ratio ( @ 22:05)            PTT - ( 21 Mar 2023 11:15 )  PTT:>200.0 sec    RADIOLOGY & ADDITIONAL TESTS:    Imaging Personally Reviewed:yes    Consultant(s) Notes Reviewed:  yes    Care Discussed with Consultants/Other Providers:yes

## 2023-03-22 NOTE — PHYSICAL THERAPY INITIAL EVALUATION ADULT - GENERAL OBSERVATIONS, REHAB EVAL
Pt received seated on edge of bed, +nasal cannula, +pulse oximeter, +telemetry, in NAD.  Pt A&Ox4.  Pt agreeable to participate in PT evaluation.  Pt left semi-supine in bed, all lines/tube intact, call bell within reach., family at bedside

## 2023-03-22 NOTE — PHYSICAL THERAPY INITIAL EVALUATION ADULT - MANUAL MUSCLE TESTING RESULTS, REHAB EVAL
Right elbow flexion 4/5, Left elbow flexion 3/5.  Bilateral  strength WNL.  Bilateral knee extension and ankle dorsiflexion 3+/5.

## 2023-03-22 NOTE — PHYSICAL THERAPY INITIAL EVALUATION ADULT - PERTINENT HX OF CURRENT PROBLEM, REHAB EVAL
Pt is a Pt is a 77 year old female presenting with shortness of breath.  Pt found to have LE edema and elevated serum creatinine.  Pt admitted for acute pulmonary embolism.  CT chest showing acute bilateral lobar branch pulmonary embolism, suggestion of right heart strain.  LE Duplex showing acute DVT bilateral popliteal and peroneal veins.  Pt on heparin for PE/DVT.  PMH listed below.

## 2023-03-22 NOTE — PHYSICAL THERAPY INITIAL EVALUATION ADULT - ADDITIONAL COMMENTS
Pt lives in a house with 4 steps to enter and 14 steps to the bedroom, each with a handrail.  Pt ambulates in the home without device, and uses a cane in the community.  Pt also owns a rolling walker.  Pt reports only ambulating short distances around the house as of recent.  Pt had a fall in the house in May 2021, no falls since then.

## 2023-03-22 NOTE — PHARMACOTHERAPY INTERVENTION NOTE - COMMENTS
Educated patient on apixaban including the purpose and administration. Discussed adverse effects in detail and when to seek medical attention (blood in stool, vomit, etc.). Informed patient to notify all providers she is on this and before any planned procedures. Patient questions and concerns were answered and addressed. Patient demonstrated understanding.    Gracy Pompa, Pharm.D.  Clinical Pharmacy Specialist  [facility] [id number]

## 2023-03-22 NOTE — PROGRESS NOTE ADULT - ASSESSMENT
77y Female with history of long standing HTN and DM presents with SOB. Nephrology consulted for elevated Scr.    1) CKD-3b: Baseline Scr 1.4-1.6 as per PMD with CKD-3b in setting of long standing HTN and DM. UA bland without proteinuria. Renal US with bilateral simple cysts. Outpatient CKD work up. Avoid nephrotoxins. Monitor electrolytes.    2) HTN with CKD: BP controlled. Holding home olmesartan but will resume if BP increases. Monitor BP.    3) LE edema: Change lasix to bumex 1 mg PO daily and restart home aldactone 25 mg daily. TTE with normal LVSF. Monitor UO.    4) PE: As per primary team.        [facility] NEPHROLOGY  Eric Acosta M.D.  Mitch Tatum D.O.  Paty Lama M.D.  Yanci Mirza, MSN, ANP-C    Telephone: [phone]  Facsimile: [phone]    [address] [place] [address]

## 2023-03-22 NOTE — PROGRESS NOTE ADULT - SUBJECTIVE AND OBJECTIVE BOX
Cardiology Progress Note  ------------------------------------------------------------------------------------------  SUBJECTIVE:   - Tele: sinus w/ PVC's  - No events overnight. States breathing is continuing to improve each day however still worse than baseline. Denies CP or Palpitations.   -------------------------------------------------------------------------------------------  ROS:  CV: chest pain (-), palpitation (-), orthopnea (-), PND (-), edema (-)  PULM: SOB (+), cough (-), wheezing (-), hemoptysis (-).   CONST: fever (-), chills (-) or fatigue (-)  GI: abdominal distension (-), abdominal pain (-) , nausea/vomiting (-), hematemesis, (-), melena (-), hematochezia (-)  : dysuria (-), frequency (-), hematuria (-).   NEURO: numbness (-), weakness (-), dizziness (-)  MSK: myalgia (-), joint pain (-)   SKIN: itching (-), rash (-)  HEENT:  visual changes (-); vertigo or throat pain (-);  neck stiffness (-)   Psych: change in mood (-), anxiety (-), depression (-)     All other review of systems is negative unless indicated above.   -------------------------------------------------------------------------------------------  VS:  T(F): 98.2 (03-22), Max: 98.7 (03-21)  HR: 85 (03-22) (83 - 104)  BP: 130/64 (03-22) (106/56 - 135/63)  RR: 18 (03-22)  SpO2: 98% (03-22)  I&O's Summary    21 Mar 2023 07:01  -  22 Mar 2023 07:00  --------------------------------------------------------  IN: 0 mL / OUT: 400 mL / NET: -400 mL      PHYSICAL EXAM:  GENERAL: NAD on NC  HEAD:  Atraumatic, Normocephalic.  EYES: EOMI, PERRLA, conjunctiva and sclera clear.  ENT: Moist mucous membranes.  NECK: Supple, No JVD.  CHEST/LUNG: Clear to auscultation bilaterally; No rales, rhonchi, wheezing, or rubs. Unlabored respirations.  HEART: Regular rate and rhythm; No murmurs, rubs, or gallops.  ABDOMEN: Bowel sounds present; Soft, Nontender, Nondistended.   EXTREMITIES: No edema. 2+ Peripheral Pulses, brisk capillary refill. No clubbing or cyanosis.   PSYCH: Normal affect.  SKIN: No rashes or lesions.    -------------------------------------------------------------------------------------------  LABS:                          8.7    7.19  )-----------( 255      ( 22 Mar 2023 06:05 )             29.5     03-22    136  |  103  |  34<H>  ----------------------------<  115<H>  4.7   |  23  |  1.43<H>    Ca    10.1      22 Mar 2023 06:05  Phos  3.5     03-22  Mg     2.00     03-22      PTT - ( 21 Mar 2023 11:15 )  PTT:>200.0 sec  CARDIAC MARKERS ( 19 Mar 2023 12:11 )  27 ng/L / x     / x     / x     / x     / x      CARDIAC MARKERS ( 18 Mar 2023 01:02 )  35 ng/L / x     / x     / x     / x     / x      CARDIAC MARKERS ( 17 Mar 2023 21:29 )  35 ng/L / x     / x     / x     / x     / x                -------------------------------------------------------------------------------------------  Meds:  acetaminophen     Tablet .. 650 milliGRAM(s) Oral every 6 hours PRN  acetaminophen   IVPB .. 1000 milliGRAM(s) IV Intermittent once PRN  apixaban 10 milliGRAM(s) Oral every 12 hours  cyclobenzaprine 5 milliGRAM(s) Oral every 12 hours PRN  dextrose 5%. 1000 milliLiter(s) IV Continuous <Continuous>  dextrose 5%. 1000 milliLiter(s) IV Continuous <Continuous>  dextrose 50% Injectable 25 Gram(s) IV Push once  dextrose 50% Injectable 12.5 Gram(s) IV Push once  dextrose 50% Injectable 25 Gram(s) IV Push once  dextrose Oral Gel 15 Gram(s) Oral once PRN  famotidine    Tablet 20 milliGRAM(s) Oral daily  folic acid 1 milliGRAM(s) Oral daily  furosemide    Tablet 40 milliGRAM(s) Oral daily  glucagon  Injectable 1 milliGRAM(s) IntraMuscular once  guaiFENesin Oral Liquid (Sugar-Free) 100 milliGRAM(s) Oral every 6 hours PRN  insulin lispro (ADMELOG) corrective regimen sliding scale   SubCutaneous three times a day before meals  insulin lispro (ADMELOG) corrective regimen sliding scale   SubCutaneous at bedtime  lansoprazole Capsule 30 milliGRAM(s) Oral daily  magnesium oxide 400 milliGRAM(s) Oral three times a day with meals    -------------------------------------------------------------------------------------------  TTE 3/20/23:  Ejection Fraction (Modified Dumont Rule): 70 %  ------------------------------------------------------------------------  OBSERVATIONS:  Mitral Valve: Mitral annular calcification, otherwise  normal mitral valve.  Aortic Root: Normal aortic root.  Aortic Valve: Calcified aortic valve with normal opening.  Mild aortic regurgitation.  Left Atrium: Normal left atrium.  LA volume index = 21  cc/m2.  Left Ventricle: Normal left ventricular systolic function.  No segmental wall motion abnormalities. Increased relative  wall thickness with normal left ventricular mass index,  consistent with concentric left ventricular remodeling.  Reversal of the E-A  waves of the mitral inflow pattern is  consistent with diastolic LV dysfunction.  Right Heart: Normal right atrium. Normal right ventricular  size and function. Normal tricuspid valve. Mild tricuspid  regurgitation. Normal pulmonic valve.  Pericardium/PleuraNormal pericardium with no pericardial  effusion.  Hemodynamic: Estimated right ventricular systolic pressure  equals 26 mm Hg, assuming right atrial pressure equals 10  mm Hg, consistent with normal pulmonary pressures.  ------------------------------------------------------------------------  CONCLUSIONS:  1. Calcified aortic valve with normal opening. Mild aortic  regurgitation.  2. Normal left ventricular internal dimensions and wall  thicknesses.  3. Normal left ventricular systolic function. No segmental  wall motion abnormalities.  4. Normal right ventricular size and function.        -------------------------------------------------------------------------------------------

## 2023-03-22 NOTE — PHYSICAL THERAPY INITIAL EVALUATION ADULT - PATIENT PROFILE REVIEW, REHAB EVAL
PT evaluate and treat orders received.  No Formal Activity Orders.  Consulted with RN Vikki, pt cleared for PT evaluation./yes

## 2023-03-22 NOTE — PROGRESS NOTE ADULT - ASSESSMENT
77F PMHx of paroxysmal Afib not on AC, remote hx of b/l knee replacement, DURAN, and HTN, who is admitted for b/l lobar branch PE, with evidence of R heart strain on CT angio and elevated proBNP, consistent with submassive PE. Found also to have b/l LE DVT's. Treating with systemic AC and clinically improving.    #Submassive PE - intermediate-high risk  - s/p heparin gtt AC, c/w apixaban AC loading dose for 7 days followed by 5mg BID  - Elevated proBNP and evidence of RV strain on CT  - proBNP 3049 -> 1368 -> 614  - trop-hs negative x3  - TTE with no evidence of RV strain/dysfunction  - US duplex LE b/l 3/19 w/ acute DVT in b/l popliteal and peroneal veins  - Age appropriate malignancy screening warranted  - if malignancy screening negative would also consider hypercoagulable screening    Cardiology will sign off at this time, please re-consult with any questions.    Travis Murguia MD  Cardiology Fellow - PGY4    Please check [email or web address]" for cardiology service schedule and contact information.

## 2023-03-23 LAB
ANION GAP SERPL CALC-SCNC: 12 MMOL/L — SIGNIFICANT CHANGE UP (ref 7–14)
BUN SERPL-MCNC: 32 MG/DL — HIGH (ref 7–23)
CALCIUM SERPL-MCNC: 10.3 MG/DL — SIGNIFICANT CHANGE UP (ref 8.4–10.5)
CHLORIDE SERPL-SCNC: 100 MMOL/L — SIGNIFICANT CHANGE UP (ref 98–107)
CO2 SERPL-SCNC: 23 MMOL/L — SIGNIFICANT CHANGE UP (ref 22–31)
CREAT SERPL-MCNC: 1.35 MG/DL — HIGH (ref 0.5–1.3)
EGFR: 40 ML/MIN/1.73M2 — LOW
GLUCOSE BLDC GLUCOMTR-MCNC: 130 MG/DL — HIGH (ref 70–99)
GLUCOSE BLDC GLUCOMTR-MCNC: 139 MG/DL — HIGH (ref 70–99)
GLUCOSE BLDC GLUCOMTR-MCNC: 171 MG/DL — HIGH (ref 70–99)
GLUCOSE BLDC GLUCOMTR-MCNC: 194 MG/DL — HIGH (ref 70–99)
GLUCOSE BLDC GLUCOMTR-MCNC: 249 MG/DL — HIGH (ref 70–99)
GLUCOSE SERPL-MCNC: 110 MG/DL — HIGH (ref 70–99)
HCT VFR BLD CALC: 28.9 % — LOW (ref 34.5–45)
HGB BLD-MCNC: 8.6 G/DL — LOW (ref 11.5–15.5)
MAGNESIUM SERPL-MCNC: 2.1 MG/DL — SIGNIFICANT CHANGE UP (ref 1.6–2.6)
MCHC RBC-ENTMCNC: 26.9 PG — LOW (ref 27–34)
MCHC RBC-ENTMCNC: 29.8 GM/DL — LOW (ref 32–36)
MCV RBC AUTO: 90.3 FL — SIGNIFICANT CHANGE UP (ref 80–100)
MRSA PCR RESULT.: SIGNIFICANT CHANGE UP
NRBC # BLD: 0 /100 WBCS — SIGNIFICANT CHANGE UP (ref 0–0)
NRBC # FLD: 0 K/UL — SIGNIFICANT CHANGE UP (ref 0–0)
PHOSPHATE SERPL-MCNC: 3.3 MG/DL — SIGNIFICANT CHANGE UP (ref 2.5–4.5)
PLATELET # BLD AUTO: 272 K/UL — SIGNIFICANT CHANGE UP (ref 150–400)
POTASSIUM SERPL-MCNC: 4.7 MMOL/L — SIGNIFICANT CHANGE UP (ref 3.5–5.3)
POTASSIUM SERPL-SCNC: 4.7 MMOL/L — SIGNIFICANT CHANGE UP (ref 3.5–5.3)
RBC # BLD: 3.2 M/UL — LOW (ref 3.8–5.2)
RBC # FLD: 15.9 % — HIGH (ref 10.3–14.5)
S AUREUS DNA NOSE QL NAA+PROBE: SIGNIFICANT CHANGE UP
SODIUM SERPL-SCNC: 135 MMOL/L — SIGNIFICANT CHANGE UP (ref 135–145)
T4 FREE SERPL-MCNC: 0.8 NG/DL — LOW (ref 0.9–1.8)
TSH SERPL-MCNC: 0.18 UIU/ML — LOW (ref 0.27–4.2)
WBC # BLD: 6.51 K/UL — SIGNIFICANT CHANGE UP (ref 3.8–10.5)
WBC # FLD AUTO: 6.51 K/UL — SIGNIFICANT CHANGE UP (ref 3.8–10.5)

## 2023-03-23 RX ORDER — CHLORHEXIDINE GLUCONATE 213 G/1000ML
1 SOLUTION TOPICAL DAILY
Refills: 0 | Status: DISCONTINUED | OUTPATIENT
Start: 2023-03-23 | End: 2023-03-28

## 2023-03-23 RX ADMIN — FAMOTIDINE 20 MILLIGRAM(S): 10 INJECTION INTRAVENOUS at 11:37

## 2023-03-23 RX ADMIN — APIXABAN 10 MILLIGRAM(S): 2.5 TABLET, FILM COATED ORAL at 17:16

## 2023-03-23 RX ADMIN — Medication 1: at 19:34

## 2023-03-23 RX ADMIN — Medication 1 MILLIGRAM(S): at 11:37

## 2023-03-23 RX ADMIN — APIXABAN 10 MILLIGRAM(S): 2.5 TABLET, FILM COATED ORAL at 05:20

## 2023-03-23 RX ADMIN — Medication 650 MILLIGRAM(S): at 11:06

## 2023-03-23 RX ADMIN — CHLORHEXIDINE GLUCONATE 1 APPLICATION(S): 213 SOLUTION TOPICAL at 11:37

## 2023-03-23 RX ADMIN — Medication 100 MILLIGRAM(S): at 21:18

## 2023-03-23 RX ADMIN — LANSOPRAZOLE 30 MILLIGRAM(S): 15 CAPSULE, DELAYED RELEASE ORAL at 11:37

## 2023-03-23 RX ADMIN — SPIRONOLACTONE 25 MILLIGRAM(S): 25 TABLET, FILM COATED ORAL at 05:31

## 2023-03-23 RX ADMIN — Medication 650 MILLIGRAM(S): at 12:06

## 2023-03-23 RX ADMIN — Medication 100 MILLIGRAM(S): at 06:34

## 2023-03-23 RX ADMIN — MAGNESIUM OXIDE 400 MG ORAL TABLET 400 MILLIGRAM(S): 241.3 TABLET ORAL at 09:10

## 2023-03-23 RX ADMIN — Medication 650 MILLIGRAM(S): at 21:18

## 2023-03-23 RX ADMIN — BUMETANIDE 1 MILLIGRAM(S): 0.25 INJECTION INTRAMUSCULAR; INTRAVENOUS at 05:32

## 2023-03-23 RX ADMIN — Medication 650 MILLIGRAM(S): at 21:48

## 2023-03-23 RX ADMIN — MAGNESIUM OXIDE 400 MG ORAL TABLET 400 MILLIGRAM(S): 241.3 TABLET ORAL at 11:37

## 2023-03-23 NOTE — PROGRESS NOTE ADULT - SUBJECTIVE AND OBJECTIVE BOX
TORI WOOTEN  77y  Female      Patient is a 77y old  Female who presents with a chief complaint of sob (23 Mar 2023 09:47)  Patient was seen and examined,chart reviewed.no sob,no cp,no fever.  Reported ecchymoric areas on upper exts-denies pain at present    REVIEW OF SYSTEMS:  CONSTITUTIONAL: No fever  RESPIRATORY: No cough, hemoptysis or shortness of breath  CARDIOVASCULAR: No chest pain, palpitations, dizziness, or leg swelling  GASTROINTESTINAL: No abdominal pain. nausea, vomiting, hematemesis  GENITOURINARY: No dysuria, frequency, hematuria   NEUROLOGICAL: No headaches, no dizziness  MUSCULOSKELETAL: No joint pain or swelling;     INTERVAL HPI/OVERNIGHT EVENTS:  T(C): 36.6 (23 @ 21:25), Max: 37.7 (23 @ 00:09)  HR: 85 (23 @ 23:08) (81 - 97)  BP: 139/73 (23 @ 21:25) (116/68 - 139/74)  RR: 18 (23 @ 21:25) (17 - 18)  SpO2: 98% (23 @ 23:08) (98% - 100%)  Wt(kg): --  I&O's Summary    T(C): 36.6 (23 @ 21:25), Max: 37.7 (23 @ 00:09)  HR: 85 (23 @ 23:08) (81 - 97)  BP: 139/73 (23 @ 21:25) (116/68 - 139/74)  RR: 18 (23 @ 21:25) (17 - 18)  SpO2: 98% (23 @ 23:08) (98% - 100%)  Wt(kg): --Vital Signs Last 24 Hrs  T(C): 36.6 (23 Mar 2023 21:25), Max: 37.7 (23 Mar 2023 00:09)  T(F): 97.9 (23 Mar 2023 21:25), Max: 99.8 (23 Mar 2023 00:09)  HR: 85 (23 Mar 2023 23:08) (81 - 97)  BP: 139/73 (23 Mar 2023 21:25) (116/68 - 139/74)  BP(mean): --  RR: 18 (23 Mar 2023 21:25) (17 - 18)  SpO2: 98% (23 Mar 2023 23:08) (98% - 100%)    Parameters below as of 23 Mar 2023 21:25  Patient On (Oxygen Delivery Method): nasal cannula w/ humidification  O2 Flow (L/min): 2      LABS:                        8.6    6.51  )-----------( 272      ( 23 Mar 2023 05:45 )             28.9     03-23    135  |  100  |  32<H>  ----------------------------<  110<H>  4.7   |  23  |  1.35<H>    Ca    10.3      23 Mar 2023 05:45  Phos  3.3     03-  Mg     2.10     03-23          CAPILLARY BLOOD GLUCOSE      POCT Blood Glucose.: 194 mg/dL (23 Mar 2023 21:16)  POCT Blood Glucose.: 171 mg/dL (23 Mar 2023 19:29)  POCT Blood Glucose.: 249 mg/dL (23 Mar 2023 18:09)  POCT Blood Glucose.: 139 mg/dL (23 Mar 2023 12:07)  POCT Blood Glucose.: 130 mg/dL (23 Mar 2023 08:23)            PAST MEDICAL & SURGICAL HISTORY:  HTN (hypertension)      Sleep apnea      Diabetes mellitus      Chronic GERD      HLD (hyperlipidemia)      S/P knee replacement  Right (  )      History of endometrial ablation        S/P  section  1977      History of unilateral oophorectomy            MEDICATIONS  (STANDING):  apixaban 10 milliGRAM(s) Oral every 12 hours  buMETAnide 1 milliGRAM(s) Oral daily  chlorhexidine 2% Cloths 1 Application(s) Topical daily  dextrose 5%. 1000 milliLiter(s) (50 mL/Hr) IV Continuous <Continuous>  dextrose 5%. 1000 milliLiter(s) (100 mL/Hr) IV Continuous <Continuous>  dextrose 50% Injectable 25 Gram(s) IV Push once  dextrose 50% Injectable 12.5 Gram(s) IV Push once  dextrose 50% Injectable 25 Gram(s) IV Push once  famotidine    Tablet 20 milliGRAM(s) Oral daily  folic acid 1 milliGRAM(s) Oral daily  glucagon  Injectable 1 milliGRAM(s) IntraMuscular once  insulin lispro (ADMELOG) corrective regimen sliding scale   SubCutaneous three times a day before meals  insulin lispro (ADMELOG) corrective regimen sliding scale   SubCutaneous at bedtime  lansoprazole Capsule 30 milliGRAM(s) Oral daily  spironolactone 25 milliGRAM(s) Oral daily    MEDICATIONS  (PRN):  acetaminophen     Tablet .. 650 milliGRAM(s) Oral every 6 hours PRN Temp greater or equal to 38C (100.4F), Mild Pain (1 - 3)  acetaminophen   IVPB .. 1000 milliGRAM(s) IV Intermittent once PRN Severe Pain (7 - 10)  cyclobenzaprine 5 milliGRAM(s) Oral every 12 hours PRN Muscle Spasm  dextrose Oral Gel 15 Gram(s) Oral once PRN Blood Glucose LESS THAN 70 milliGRAM(s)/deciliter  guaiFENesin Oral Liquid (Sugar-Free) 100 milliGRAM(s) Oral every 6 hours PRN Cough        RADIOLOGY & ADDITIONAL TESTS:    Imaging Personally Reviewed:  [ ] YES  [ ] NO    Consultant(s) Notes Reviewed:  [ ] YES  [ ] NO    PHYSICAL EXAM:  GENERAL: Alert and awake lying in bed in no distress  HEAD:  Atraumatic, Normocephalic  EYES: EOMI, DIO, conjunctiva and sclera clear  NECK: Supple, No JVD, Normal thyroid  NERVOUS SYSTEM:  Alert & Oriented X3, Motor and sensory systems are intact,   CHEST/LUNG: Bilateral clear breath sounds, no rhochi, no wheezing, no crepitations,  HEART: Regular rate and rhythm; No murmurs, rubs, or gallops  ABDOMEN: Soft, Nontender, Nondistended; Bowel sounds present  EXTREMITIES:   Peripheral Pulses are palpable, no  edema upper exts b/l ecchymotic areas,no tenderness        Care Discussed with Consultants/Other Providers [x ] YES  [ ] NO      Code Status: [] Full Code [] DNR [] DNI [] Goals of Care:   Disposition: [] ICU [] Stroke Unit [] RCU []PCU []Floor [] Discharge Home         ANGI MillerFACP

## 2023-03-23 NOTE — PROGRESS NOTE ADULT - ASSESSMENT
77y Female with history of long standing HTN and DM presents with SOB. Nephrology consulted for elevated Scr.    1) CKD-3b: Baseline Scr 1.4-1.6 as per PMD with CKD-3b in setting of long standing HTN and DM. Scr @ baseline. UA bland without proteinuria. Renal US with bilateral simple cysts. Outpatient CKD work up. Avoid nephrotoxins. Monitor electrolytes.    2) HTN with CKD: BP controlled. Holding home olmesartan but will resume if BP increases. Monitor BP.    3) LE edema: Continue with bumex 1 mg PO daily and aldactone 25 mg daily. TTE with normal LVSF. Monitor UO.    4) PE: As per primary team.        [facility] NEPHROLOGY  Eric Acosta M.D.  Mitch Tatum D.O.  Paty Lama M.D.  Yanci Mirza, MSN, ANP-C    Telephone: [phone]  Facsimile: [phone]    [address] [place] [address]

## 2023-03-23 NOTE — CHART NOTE - NSCHARTNOTEFT_GEN_A_CORE
76 y/o F with b/l upper hematoma R > L on AC. Patient states she had an IV placed 4 days ago in the right arm and was placed on Heparin gtt and now on Eliquis. Patient states she developed hematoma that is now painful and warm. Denies trauma to the area or fall.  On examination, posterior aspect of right upper extremity is large ecchymotic area (approximately 23 cm), warm to touch, tender to palpation.   Full range of motion intact. Pulses brachial, radial intact.   Posterior aspect Left upper extremity ecchymotic area (approximately 10 cm), non tender.  Patient agrees to the fact that we cannot stop AC at this time due to PE and B/L LE DVT.   Will do ultrasound of b/l upper extremities to r/o dvt / bleeding.

## 2023-03-23 NOTE — PROGRESS NOTE ADULT - SUBJECTIVE AND OBJECTIVE BOX
[facility] NEPHROLOGY- PROGRESS NOTE    77y Female with history of long standing HTN and DM presents with SOB. Nephrology consulted for elevated Scr.    REVIEW OF SYSTEMS:  Gen: no fevers  Cards: no chest pain  Resp: + dyspnea  GI: no nausea or vomiting or diarrhea  Vascular: + LE edema    codeine (Other)  penicillin (Blisters)      Hospital Medications: Medications reviewed      VITALS:  T(F): 99.8 (23 @ 00:09), Max: 99.8 (23 @ 00:09)  HR: 90 (23 @ 07:38)  BP: 139/74 (23 @ 05:18)  RR: 18 (23 @ 05:18)  SpO2: 98% (23 @ 07:38)  Wt(kg): --      PHYSICAL EXAM:    Gen: NAD, calm  Cards: RRR, +S1/S2, no M/G/R  Resp: CTA B/L  GI: soft, NT/ND, NABS  Vascular: trace LE edema B/L      LABS:      135  |  100  |  32<H>  ----------------------------<  110<H>  4.7   |  23  |  1.35<H>    Ca    10.3      23 Mar 2023 05:45  Phos  3.3       Mg     2.10           Creatinine Trend: 1.35 <--, 1.43 <--, 1.62 <--, 1.57 <--, 1.58 <--, 1.54 <--, 1.58 <--, 1.56 <--, 1.59 <--                        8.6    6.51  )-----------( 272      ( 23 Mar 2023 05:45 )             28.9     Urine Studies:  Urinalysis Basic - ( 21 Mar 2023 22:05 )    Color: Light Yellow / Appearance: Clear / S.019 / pH:   Gluc:  / Ketone: Negative  / Bili: Negative / Urobili: <2 mg/dL   Blood:  / Protein: Trace / Nitrite: Negative   Leuk Esterase: Negative / RBC: 1 /HPF / WBC 1 /HPF   Sq Epi:  / Non Sq Epi:  / Bacteria:       Creatinine, Random Urine: 157 mg/dL ( @ 22:05)  Protein/Creatinine Ratio Calculation: 0.1 Ratio ( @ 22:05)

## 2023-03-24 ENCOUNTER — APPOINTMENT (OUTPATIENT)
Dept: CT IMAGING | Facility: CLINIC | Age: 78
End: 2023-03-24

## 2023-03-24 ENCOUNTER — APPOINTMENT (OUTPATIENT)
Dept: ULTRASOUND IMAGING | Facility: CLINIC | Age: 78
End: 2023-03-24

## 2023-03-24 LAB
ANION GAP SERPL CALC-SCNC: 9 MMOL/L — SIGNIFICANT CHANGE UP (ref 7–14)
BUN SERPL-MCNC: 34 MG/DL — HIGH (ref 7–23)
CALCIUM SERPL-MCNC: 10.2 MG/DL — SIGNIFICANT CHANGE UP (ref 8.4–10.5)
CHLORIDE SERPL-SCNC: 100 MMOL/L — SIGNIFICANT CHANGE UP (ref 98–107)
CO2 SERPL-SCNC: 25 MMOL/L — SIGNIFICANT CHANGE UP (ref 22–31)
CREAT SERPL-MCNC: 1.42 MG/DL — HIGH (ref 0.5–1.3)
EGFR: 38 ML/MIN/1.73M2 — LOW
GLUCOSE BLDC GLUCOMTR-MCNC: 106 MG/DL — HIGH (ref 70–99)
GLUCOSE BLDC GLUCOMTR-MCNC: 118 MG/DL — HIGH (ref 70–99)
GLUCOSE BLDC GLUCOMTR-MCNC: 132 MG/DL — HIGH (ref 70–99)
GLUCOSE BLDC GLUCOMTR-MCNC: 162 MG/DL — HIGH (ref 70–99)
GLUCOSE SERPL-MCNC: 103 MG/DL — HIGH (ref 70–99)
HCT VFR BLD CALC: 28.4 % — LOW (ref 34.5–45)
HGB BLD-MCNC: 8.6 G/DL — LOW (ref 11.5–15.5)
MAGNESIUM SERPL-MCNC: 2.3 MG/DL — SIGNIFICANT CHANGE UP (ref 1.6–2.6)
MCHC RBC-ENTMCNC: 27 PG — SIGNIFICANT CHANGE UP (ref 27–34)
MCHC RBC-ENTMCNC: 30.3 GM/DL — LOW (ref 32–36)
MCV RBC AUTO: 89 FL — SIGNIFICANT CHANGE UP (ref 80–100)
NRBC # BLD: 0 /100 WBCS — SIGNIFICANT CHANGE UP (ref 0–0)
NRBC # FLD: 0.03 K/UL — HIGH (ref 0–0)
PHOSPHATE SERPL-MCNC: 3.4 MG/DL — SIGNIFICANT CHANGE UP (ref 2.5–4.5)
PLATELET # BLD AUTO: 282 K/UL — SIGNIFICANT CHANGE UP (ref 150–400)
POTASSIUM SERPL-MCNC: 5 MMOL/L — SIGNIFICANT CHANGE UP (ref 3.5–5.3)
POTASSIUM SERPL-SCNC: 5 MMOL/L — SIGNIFICANT CHANGE UP (ref 3.5–5.3)
RBC # BLD: 3.19 M/UL — LOW (ref 3.8–5.2)
RBC # FLD: 16 % — HIGH (ref 10.3–14.5)
SODIUM SERPL-SCNC: 134 MMOL/L — LOW (ref 135–145)
WBC # BLD: 6.26 K/UL — SIGNIFICANT CHANGE UP (ref 3.8–10.5)
WBC # FLD AUTO: 6.26 K/UL — SIGNIFICANT CHANGE UP (ref 3.8–10.5)

## 2023-03-24 PROCEDURE — 99233 SBSQ HOSP IP/OBS HIGH 50: CPT

## 2023-03-24 RX ORDER — FAMOTIDINE 10 MG/ML
10 INJECTION INTRAVENOUS DAILY
Refills: 0 | Status: DISCONTINUED | OUTPATIENT
Start: 2023-03-25 | End: 2023-03-28

## 2023-03-24 RX ADMIN — Medication 100 MILLIGRAM(S): at 21:07

## 2023-03-24 RX ADMIN — Medication 1 MILLIGRAM(S): at 11:57

## 2023-03-24 RX ADMIN — CHLORHEXIDINE GLUCONATE 1 APPLICATION(S): 213 SOLUTION TOPICAL at 11:57

## 2023-03-24 RX ADMIN — SPIRONOLACTONE 25 MILLIGRAM(S): 25 TABLET, FILM COATED ORAL at 05:34

## 2023-03-24 RX ADMIN — LANSOPRAZOLE 30 MILLIGRAM(S): 15 CAPSULE, DELAYED RELEASE ORAL at 11:59

## 2023-03-24 RX ADMIN — Medication 100 MILLIGRAM(S): at 06:42

## 2023-03-24 RX ADMIN — Medication 650 MILLIGRAM(S): at 07:41

## 2023-03-24 RX ADMIN — APIXABAN 10 MILLIGRAM(S): 2.5 TABLET, FILM COATED ORAL at 17:35

## 2023-03-24 RX ADMIN — BUMETANIDE 1 MILLIGRAM(S): 0.25 INJECTION INTRAMUSCULAR; INTRAVENOUS at 05:34

## 2023-03-24 RX ADMIN — APIXABAN 10 MILLIGRAM(S): 2.5 TABLET, FILM COATED ORAL at 05:35

## 2023-03-24 RX ADMIN — Medication 650 MILLIGRAM(S): at 06:41

## 2023-03-24 RX ADMIN — FAMOTIDINE 20 MILLIGRAM(S): 10 INJECTION INTRAVENOUS at 11:57

## 2023-03-24 RX ADMIN — Medication 650 MILLIGRAM(S): at 21:07

## 2023-03-24 NOTE — PROGRESS NOTE ADULT - SUBJECTIVE AND OBJECTIVE BOX
PATIENT SEEN AND EXAMINED ON :- 3/24/23  DATE OF SERVICE:    3/24/23         Interim events noted,Labs ,Radiological studies and Cardiology tests reviewed .    MR#[id number]  PATIENT NAME:-[facility] COURSE: HPI:  77-year-old woman with a past medical history of HTN, HLD, GERD, DM 2, DURAN, paroxysmal A-fib(?) not on anticoagulation, remote history of bilateral knee replacements presents to the ED with worsening shortness of breath over the last 2 weeks.  Patient endorses worsening dyspnea on exertion.  Patient was noted to have an elevated D-dimer and sent to the ED by Cardiologist Dr. Irizarry  for further evaluation.  CT Scan of Chest shows acute pulmonary emboli in the right interlobar artery and right upper, middle, and lower lobar branches; acute pulmonary emboli within the left upper and lower lobar branches and suggestion of right heart strain.  On interview, patient states she developed shortness of breath 2 weeks ago.  She saw her pulmonologist, Dr. Valenzuela, on 3/13/22 and was informed she did not have a pulmonary cause for her SOB.  She followed up with her cardiologist, Dr. Irizarry, today and D-Dimers came back high and was directed to [facility] ED.  She denies recent long distance travel, recent trauma or surgery, smoking history, use of hormone replacement medications or a personal history of cancer.  She does report breast cancer of sister and mother.  She reports having a mammogram this past August with normal findings.  She denies fever, chills, recent sick contact, chest pain, abdominal pain N/V/D.  She does state she had a stress test and echocardiogram with Dr. Irizarry 3 years ago that was normal. She reports a remote history (10 yrs?) of Atrial Fibrillation but when stated on Cardizem she remained in SR was placed on Holter and her primary team did not continue anticoagulation. On assessment, patient A+OX3 in no acute distress, SR 90s, /60, RR 18, SATing 100% on 4L NC, lung sounds diminished at bases, Temp 98.4F, no JVD, bilateral pedal edema +1 with right leg calf pain.  Lab show elevated D-Dimer 2837, K+ 5.4 Creat 1.59, Troponins 35 ->35, pBNP 3049, Lactate 1.1.  CT Scan Chest shows acute pulmonary emboli in the right interlobar artery and right upper, middle, and lower lobar branches; cute pulmonary emboli within the left upper and lower lobar branches with suggestion of right heart strain; incidental finding of left lower lobe 4mm calcified granuloma.   (18 Mar 2023 12:40)      INTERIM EVENTS:Patient seen at bedside ,interim events noted.      PMH -reviewed admission note, no change since admission  HEART FAILURE: Acute[ ]Chronic[ ] Systolic[ ] Diastolic[ ] Combined Systolic and Diastolic[ ]  CAD[ ] CABG[ ] PCI[ ]  DEVICES[ ] PPM[ ] ICD[ ] ILR[ ]  ATRIAL FIBRILLATION[ ] Paroxysmal[ ] Permanent[ ] CHADS2-[  ]  JACKLYN[ ] CKD1[ ] CKD2[ ] CKD3[ ] CKD4[ ] ESRD[ ]  COPD[ ] HTN[ ]   DM[ ] Type1[ ] Type 2[ ]   CVA[ ] Paresis[ ]    AMBULATION: Assisted[ ] Cane/walker[ ] Independent[ ]    MEDICATIONS  (STANDING):  apixaban 10 milliGRAM(s) Oral every 12 hours  buMETAnide 1 milliGRAM(s) Oral daily  chlorhexidine 2% Cloths 1 Application(s) Topical daily  dextrose 5%. 1000 milliLiter(s) (50 mL/Hr) IV Continuous <Continuous>  dextrose 5%. 1000 milliLiter(s) (100 mL/Hr) IV Continuous <Continuous>  dextrose 50% Injectable 25 Gram(s) IV Push once  dextrose 50% Injectable 12.5 Gram(s) IV Push once  dextrose 50% Injectable 25 Gram(s) IV Push once  folic acid 1 milliGRAM(s) Oral daily  glucagon  Injectable 1 milliGRAM(s) IntraMuscular once  insulin lispro (ADMELOG) corrective regimen sliding scale   SubCutaneous three times a day before meals  insulin lispro (ADMELOG) corrective regimen sliding scale   SubCutaneous at bedtime  lansoprazole Capsule 30 milliGRAM(s) Oral daily  spironolactone 25 milliGRAM(s) Oral daily    MEDICATIONS  (PRN):  acetaminophen     Tablet .. 650 milliGRAM(s) Oral every 6 hours PRN Temp greater or equal to 38C (100.4F), Mild Pain (1 - 3)  acetaminophen   IVPB .. 1000 milliGRAM(s) IV Intermittent once PRN Severe Pain (7 - 10)  cyclobenzaprine 5 milliGRAM(s) Oral every 12 hours PRN Muscle Spasm  dextrose Oral Gel 15 Gram(s) Oral once PRN Blood Glucose LESS THAN 70 milliGRAM(s)/deciliter  guaiFENesin Oral Liquid (Sugar-Free) 100 milliGRAM(s) Oral every 6 hours PRN Cough            REVIEW OF SYSTEMS:  Constitutional: [ ] fever, [ ]weight loss,  [ ]fatigue [ ]weight gain  Eyes: [ ] visual changes  Respiratory: [ ]shortness of breath;  [ ] cough, [ ]wheezing, [ ]chills, [ ]hemoptysis  Cardiovascular: [ ] chest pain, [ ]palpitations, [ ]dizziness,  [ ]leg swelling[ ]orthopnea[ ]PND  Gastrointestinal: [ ] abdominal pain, [ ]nausea, [ ]vomiting,  [ ]diarrhea [ ]Constipation [ ]Melena  Genitourinary: [ ] dysuria, [ ] hematuria [ ]Zaragoza  Neurologic: [ ] headaches [ ] tremors[ ]weakness [ ]Paralysis Right[ ] Left[ ]  Skin: [ ] itching, [ ]burning, [ ] rashes  Endocrine: [ ] heat or cold intolerance  Musculoskeletal: [ ] joint pain or swelling; [ ] muscle, back, or extremity pain  Psychiatric: [ ] depression, [ ]anxiety, [ ]mood swings, or [ ]difficulty sleeping  Hematologic: [ ] easy bruising, [ ] bleeding gums    [ ] All remaining systems negative except as per above.   [ ]Unable to obtain.  [x] No change in ROS since admission      Vital Signs Last 24 Hrs  T(C): 37.5 (24 Mar 2023 19:26), Max: 37.5 (24 Mar 2023 19:26)  T(F): 99.5 (24 Mar 2023 19:26), Max: 99.5 (24 Mar 2023 19:26)  HR: 106 (24 Mar 2023 19:26) (85 - 106)  BP: 145/79 (24 Mar 2023 19:26) (122/85 - 149/87)  BP(mean): --  RR: 18 (24 Mar 2023 19:26) (18 - 18)  SpO2: 93% (24 Mar 2023 19:26) (93% - 100%)    Parameters below as of 24 Mar 2023 19:26  Patient On (Oxygen Delivery Method): nasal cannula w/ humidification  O2 Flow (L/min): 2    I&O's Summary    23 Mar 2023 07:01  -  24 Mar 2023 07:00  --------------------------------------------------------  IN: 0 mL / OUT: 201 mL / NET: -201 mL    24 Mar 2023 07:01  -  24 Mar 2023 21:04  --------------------------------------------------------  IN: 200 mL / OUT: 0 mL / NET: 200 mL        PHYSICAL EXAM:  General: No acute distress BMI-  HEENT: EOMI, PERRL  Neck: Supple, [ ] JVD  Lungs: Equal air entry bilaterally; [ ] rales [ ] wheezing [ ] rhonchi  Heart: Regular rate and rhythm; [x ] murmur   2/6 [ x] systolic [ ] diastolic [ ] radiation[ ] rubs [ ]  gallops  Abdomen: Nontender, bowel sounds present  Extremities: No clubbing, cyanosis, [ ] edema [ ]Pulses  equal and intact  Nervous system:  Alert & Oriented X3, no focal deficits  Psychiatric: Normal affect  Skin: No rashes or lesions    LABS:  03-24    134<L>  |  100  |  34<H>  ----------------------------<  103<H>  5.0   |  25  |  1.42<H>    Ca    10.2      24 Mar 2023 05:05  Phos  3.4     03-24  Mg     2.30     03-24      Creatinine Trend: 1.42<--, 1.35<--, 1.43<--, 1.62<--, 1.57<--, 1.58<--                        8.6    6.26  )-----------( 282      ( 24 Mar 2023 05:05 )             28.4

## 2023-03-24 NOTE — DIETITIAN INITIAL EVALUATION ADULT - OTHER INFO
Medical course: Per chart 77 year old female PMH HTN, HLD, GERD, DM 2, DURAN, paroxysmal A-fib not on anticoagulation, remote history of bilateral knee replacements presents to the ED admitted with Bilateral Pulmonary Embolism with unknown provocation confirmed by CT Chest .     Nutrition interview: Pt A&Ox4. No recent episodes of nausea, vomiting, diarrhea or constipation, last BM noted on 3/23 per RN flowsheets. Denies any chewing/swallowing difficulties. No known food allergies. Stated UBW: 260-265# and has gained some weight due to fluid accumulation. Intake is % per RN per pt. Feeding skills: tray set up. Pt with well controlled DM, A1c 6.1%. Pt with elevated TG, heart healthy diet education provided.

## 2023-03-24 NOTE — PROGRESS NOTE ADULT - ASSESSMENT
77y Female with history of long standing HTN and DM presents with SOB. Nephrology consulted for elevated Scr.    1) CKD-3b: Baseline Scr 1.4-1.6 as per PMD with CKD-3b in setting of long standing HTN and DM. Scr @ baseline. UA bland without proteinuria. Renal US with bilateral simple cysts. Outpatient CKD work up. Avoid nephrotoxins. Monitor electrolytes.    2) HTN with CKD: BP controlled. Holding home olmesartan but will resume if BP increases. Monitor BP.    3) LE edema: Patient drinking excessively and advised to restrict fluid intake to 1.2L daily. Continue with bumex 1 mg PO daily and aldactone 25 mg daily. TTE with normal LVSF. Monitor UO.    4) PE: As per primary team.        [facility] NEPHROLOGY  Eric Acosta M.D.  Mitch Tatum D.O.  Paty Lama M.D.  Yanci Mirza, CHARLI, ANP-C    Telephone: [phone]  Facsimile: [phone]    [address] [place] [address]

## 2023-03-24 NOTE — PROGRESS NOTE ADULT - SUBJECTIVE AND OBJECTIVE BOX
PULMONARY PROGRESS NOTE    TORI WOOTEN  MRN-[id number]    Patient is a 77y old  Female who presents with a chief complaint of sob (20 Mar 2023 11:33)      HPI:  sitting up in bed on nc  says she feels ok    MEDICATIONS  (STANDING):  apixaban 10 milliGRAM(s) Oral every 12 hours  buMETAnide 1 milliGRAM(s) Oral daily  chlorhexidine 2% Cloths 1 Application(s) Topical daily  dextrose 5%. 1000 milliLiter(s) (50 mL/Hr) IV Continuous <Continuous>  dextrose 5%. 1000 milliLiter(s) (100 mL/Hr) IV Continuous <Continuous>  dextrose 50% Injectable 25 Gram(s) IV Push once  dextrose 50% Injectable 12.5 Gram(s) IV Push once  dextrose 50% Injectable 25 Gram(s) IV Push once  famotidine    Tablet 20 milliGRAM(s) Oral daily  folic acid 1 milliGRAM(s) Oral daily  glucagon  Injectable 1 milliGRAM(s) IntraMuscular once  insulin lispro (ADMELOG) corrective regimen sliding scale   SubCutaneous three times a day before meals  insulin lispro (ADMELOG) corrective regimen sliding scale   SubCutaneous at bedtime  lansoprazole Capsule 30 milliGRAM(s) Oral daily  spironolactone 25 milliGRAM(s) Oral daily    MEDICATIONS  (PRN):  acetaminophen     Tablet .. 650 milliGRAM(s) Oral every 6 hours PRN Temp greater or equal to 38C (100.4F), Mild Pain (1 - 3)  acetaminophen   IVPB .. 1000 milliGRAM(s) IV Intermittent once PRN Severe Pain (7 - 10)  cyclobenzaprine 5 milliGRAM(s) Oral every 12 hours PRN Muscle Spasm  dextrose Oral Gel 15 Gram(s) Oral once PRN Blood Glucose LESS THAN 70 milliGRAM(s)/deciliter  guaiFENesin Oral Liquid (Sugar-Free) 100 milliGRAM(s) Oral every 6 hours PRN Cough        EXAM:  Vital Signs Last 24 Hrs  T(C): 36.6 (24 Mar 2023 07:33), Max: 36.6 (23 Mar 2023 13:30)  T(F): 97.8 (24 Mar 2023 07:33), Max: 97.9 (23 Mar 2023 13:30)  HR: 87 (24 Mar 2023 07:33) (85 - 97)  BP: 143/78 (24 Mar 2023 07:33) (128/71 - 143/78)  BP(mean): --  RR: 18 (24 Mar 2023 07:33) (17 - 18)  SpO2: 99% (24 Mar 2023 07:33) (98% - 100%)    Parameters below as of 24 Mar 2023 07:33  Patient On (Oxygen Delivery Method): nasal cannula w/ humidification  O2 Flow (L/min): 2        GENERAL: The patient is awake and alert in no apparent distress.     LUNGS: Clear to auscultation without wheezing, rales or rhonchi; respirations unlabored                             8.6    6.26  )-----------( 282      ( 24 Mar 2023 05:05 )             28.4   03-24    134<L>  |  100  |  34<H>  ----------------------------<  103<H>  5.0   |  25  |  1.42<H>    Ca    10.2      24 Mar 2023 05:05  Phos  3.4     03-24  Mg     2.30     03-24           < from: CT Angio Chest PE Protocol w/ IV Cont (03.18.23 @ 01:12) >    ACC: [id number] EXAM:  [facility]ST [facility]   ORDERED BY: YAYO MCLEAN     PROCEDURE DATE:  03/18/2023          INTERPRETATION:  CLINICAL INFORMATION: Chest pain. Evaluate for pulmonary   embolism.    COMPARISON: CTA chest 8/8/2020    CONTRAST/COMPLICATIONS:  IV Contrast: Omnipaque 350  70 cc administered   30 cc discarded  Oral Contrast: NONE  Complications: None reported at time of study completion    PROCEDURE:  CT Angiography of the Chest.  Sagittal and coronal reformats were performed as well as 3D (MIP)   reconstructions.    FINDINGS:    LUNGS AND AIRWAYS: Patent central airways.  Left lower lobe calcified   granuloma. A 4 mm nodule in the left lower lobe. Bibasilar subsegmental   atelectasis.  PLEURA: No pleural effusion.  MEDIASTINUM AND JAVIER: No lymphadenopathy and several calcified   mediastinal and hilar lymph nodes.  VESSELS: Acute pulmonary emboli in the right interlobar artery and right   upper, middle, and lower lobar branches. Acute pulmonary emboli within   the left upper and lower lobar branches.  HEART: Increased size of the right ventricle as compared to the left   ventricle, with leftward convexity of the interventricular septum. No   pericardial effusion.  CHEST WALL AND LOWER NECK: Redemonstrated nodular thyroid.  VISUALIZED UPPER ABDOMEN: Small hiatal hernia. Scattered splenic   granulomata. Left renal cyst.  BONES: Chronic fracture deformities of the left fourth through sixth   ribs. Degenerative changes.    IMPRESSION:  Acute bilateral lobar branch pulmonary emboli as described above, with   suggestion of right heart strain.    MD Martha was informed of these results by Melinda Pinon MD with   read back at about 2:18 AM on 3/18/2023    --- End of Report ---          MELINDA PINON MD; Resident Radiologist  This document has been electronically signed.  ALYSON LEE MD; Attending Radiologist  This document has been electronically signed. Mar 18 2023  3:44AM    < end of copied text >  < from: US Duplex Venous Lower Ext Complete, Bilateral (03.19.23 @ 10:44) >    ACC: [id number] EXAM:  US DPLX LWR EXT VEINS COMPL BI   ORDERED BY: TAMMY KITCHEN     PROCEDURE DATE:  03/19/2023          INTERPRETATION:  CLINICAL INFORMATION: Dyspnea on exertion    COMPARISON: Lower extremity Doppler ultrasound 8/8/2020    TECHNIQUE: Duplex sonography of the BILATERAL LOWER extremity veins with   color and spectral Doppler, with and without compression.    FINDINGS:    RIGHT:  Normal compressibility of the RIGHT common femoral and femoral veins.   Thrombus within the right popliteal vein.  Doppler examination shows normal spontaneous and phasic flow in the   common femoral and femoral veins, and mildly decreased flow within the   right popliteal vein.  The right posterior tibial and gastrocnemius veins are patent. Thrombus  within the right peroneal vein. The soleal vein is not visualized.    LEFT:  Normal compressibility of the LEFT common femoral and femoral veins.   Thrombus within the left popliteal vein.  Doppler examination shows normal spontaneous and phasic flow in the   common femoral and femoral veins, and mildly decreased flow within the   left popliteal vein.  The left posterior tibial vein and gastrocnemius veins are patent.   Thrombus within the left peroneal vein. The soleal vein is not visualized.    IMPRESSION:  Acute Deep venous thrombosis in the BILATERAL popliteal and peroneal   veins.    Findings were discussed with provider Avery by Dr. Gomez on 3/19/2023   11:04 AM with read back confirmation.    --- End of Report ---          ONUR GOMEZ MD; Resident Radiologist  This document has been electronically signed.  SHEREE LEON MD; Attending Radiologist  This document has been electronically signed. Mar 19 2023 11:35AM    < end of copied text >  < from: Transthoracic Echocardiogram (03.20.23 @ 09:01) >  ------------------------------------------------------------------------  CONCLUSIONS:  1. Calcified aortic valve with normal opening. Mild aortic  regurgitation.  2. Normal left ventricular internal dimensions and wall  thicknesses.  3. Normal left ventricular systolic function. No segmental  wall motion abnormalities.  4. Normal right ventricular size and function.  ------------------------------------------------------------------------  Confirmed on  3/20/2023 - 11:33:22 by Bennie Fritz M.D. RPVI  ------------------------------------------------------------------------    < end of copied text >      PROBLEM LIST:  77y Female with HEALTH ISSUES - PROBLEM Dx:  Acute pulmonary embolism    Sleep apnea    Diabetes mellitus    Diabetes mellitus    Hypertension    Hyperlipidemia    Chronic atrial fibrillation    PAF (paroxysmal atrial fibrillation)    JACKLYN (acute kidney injury)    Suspected deep vein thrombosis (DVT)    Pulmonary thromboembolism              RECS:  full AC  cpap with sleep  wean O2  incentive divya  oob/chair  close f/u with DR Valenzuela    Please call with any questions.    Gilma Luque MD  [facility] Pulmonary/Sleep Medicine  [phone]

## 2023-03-24 NOTE — DIETITIAN INITIAL EVALUATION ADULT - PERTINENT LABORATORY DATA
03-24 Na 134 mmol/L<L> Glu 103 mg/dL<H> K+ 5.0 mmol/L Cr 1.42 mg/dL<H> BUN 34 mg/dL<H> Phos 3.4 mg/dL  03-24 @ 12:07 POCT 106 mg/dL  A1C with Estimated Average Glucose Result: 6.1 % (03-18-23 @ 09:30)

## 2023-03-24 NOTE — PROGRESS NOTE ADULT - SUBJECTIVE AND OBJECTIVE BOX
[facility] NEPHROLOGY- PROGRESS NOTE    77y Female with history of long standing HTN and DM presents with SOB. Nephrology consulted for elevated Scr.    REVIEW OF SYSTEMS:  Gen: no fevers  Cards: no chest pain  Resp: + dyspnea improving  GI: no nausea or vomiting or diarrhea  Vascular: + LE edema    codeine (Other)  penicillin (Blisters)      Hospital Medications: Medications reviewed      VITALS:  T(F): 98.1 (23 @ 11:33), Max: 98.1 (23 @ 11:33)  HR: 93 (23 @ 11:33)  BP: 122/85 (23 @ 11:33)  RR: 18 (23 @ 11:33)  SpO2: 100% (23 @ 11:33)  Wt(kg): --     @ 07:01  -   @ 07:00  --------------------------------------------------------  IN: 0 mL / OUT: 201 mL / NET: -201 mL      PHYSICAL EXAM:    Gen: NAD, calm  Cards: RRR, +S1/S2, no M/G/R  Resp: CTA B/L  GI: soft, NT/ND, NABS  Vascular: 1+ LE edema B/L      LABS:      134<L>  |  100  |  34<H>  ----------------------------<  103<H>  5.0   |  25  |  1.42<H>    Ca    10.2      24 Mar 2023 05:05  Phos  3.4       Mg     2.30           Creatinine Trend: 1.42 <--, 1.35 <--, 1.43 <--, 1.62 <--, 1.57 <--, 1.58 <--, 1.54 <--, 1.58 <--, 1.56 <--, 1.59 <--                        8.6    6.26  )-----------( 282      ( 24 Mar 2023 05:05 )             28.4     Urine Studies:  Urinalysis Basic - ( 21 Mar 2023 22:05 )    Color: Light Yellow / Appearance: Clear / S.019 / pH:   Gluc:  / Ketone: Negative  / Bili: Negative / Urobili: <2 mg/dL   Blood:  / Protein: Trace / Nitrite: Negative   Leuk Esterase: Negative / RBC: 1 /HPF / WBC 1 /HPF   Sq Epi:  / Non Sq Epi:  / Bacteria:       Creatinine, Random Urine: 157 mg/dL ( @ 22:05)  Protein/Creatinine Ratio Calculation: 0.1 Ratio ( @ 22:05)

## 2023-03-24 NOTE — DIETITIAN INITIAL EVALUATION ADULT - LITERATURE/VIDEOS GIVEN
Heart healthy Nutrition Therapy (2022) handout provided and explained. Discussed limiting intake of processed foods, trans fats and saturated fats. Choosing heart healthy saturated fats. Choosing lean proteins and low fat dairy. Limiting carbohydrate intake. Increasing intake of whole plant foods and fiber. Reducing intake of sodium.

## 2023-03-24 NOTE — DIETITIAN INITIAL EVALUATION ADULT - ORAL INTAKE PTA/DIET HISTORY
Pt lives alone, is able to cook for herself and her daughter does grocery shopping for her. Pt follows a diabetic diet at home and avoids eating breads, potatoes, and starches. Folic acid and vitamin E supplement taken PTA.

## 2023-03-24 NOTE — DIETITIAN INITIAL EVALUATION ADULT - NS FNS DIET ORDER
Diet, DASH/TLC:   Sodium & Cholesterol Restricted  Consistent Carbohydrate {No Snacks} (CSTCHO) (03-18-23 @ 13:25) [Active]

## 2023-03-24 NOTE — PROGRESS NOTE ADULT - SUBJECTIVE AND OBJECTIVE BOX
WOOTENTORI  77y  Female      Patient is a 77y old  Female who presents with a chief complaint of Other pulmonary embolism without acute cor pulmonale  comfortable,nad,denies pain in arms.b/l upper arm ecchymosis reported       REVIEW OF SYSTEMS:  CONSTITUTIONAL: No fever  RESPIRATORY: No cough, hemoptysis or shortness of breath  CARDIOVASCULAR: No chest pain, palpitations, dizziness, or leg swelling  GASTROINTESTINAL: No abdominal pain. nausea, vomiting, hematemesis  GENITOURINARY: No dysuria, frequency, hematuria   NEUROLOGICAL: No headaches, no dizziness  MUSCULOSKELETAL: No joint pain or swelling;     INTERVAL HPI/OVERNIGHT EVENTS:  T(C): 36.7 (23 @ 11:33), Max: 36.7 (23 @ 11:33)  HR: 93 (23 @ :33) ( - 97)  BP: 122/85 (23 @ :33) (122/ - 143/)  RR: 18 (23 @ :) (18 - 18)  SpO2: 100% (23 @ :33) (98% - 100%)  Wt(kg): --  I&O's Summary    23 Mar 2023 07:01  -  24 Mar 2023 07:00  --------------------------------------------------------  IN: 0 mL / OUT: 201 mL / NET: -201 mL      T(C): 36.7 (23 @ 11:33), Max: 36.7 (23 @ 11:33)  HR: 93 (23 @ 11:33) ( - 97)  BP: 122/85 (23 @ 11:33) (122/85 - 143/78)  RR: 18 (23 @ :33) (18 - 18)  SpO2: 100% (23 @ 11:33) (98% - 100%)  Wt(kg): --Vital Signs Last 24 Hrs  T(C): 36.7 (24 Mar 2023 11:33), Max: 36.7 (24 Mar 2023 11:33)  T(F): 98.1 (24 Mar 2023 11:33), Max: 98.1 (24 Mar 2023 11:33)  HR: 93 (24 Mar 2023 11:33) (85 - 97)  BP: 122/85 (24 Mar 2023 11:33) (122/85 - 143/78)  BP(mean): --  RR: 18 (24 Mar 2023 11:33) (18 - 18)  SpO2: 100% (24 Mar 2023 11:33) (98% - 100%)    Parameters below as of 24 Mar 2023 11:33  Patient On (Oxygen Delivery Method): nasal cannula w/ humidification  O2 Flow (L/min): 2      LABS:                        8.6    6.26  )-----------( 282      ( 24 Mar 2023 05:05 )             28.4     03-24    134<L>  |  100  |  34<H>  ----------------------------<  103<H>  5.0   |  25  |  1.42<H>    Ca    10.2      24 Mar 2023 05:05  Phos  3.4     03-24  Mg     2.30     03-24          CAPILLARY BLOOD GLUCOSE      POCT Blood Glucose.: 106 mg/dL (24 Mar 2023 12:07)  POCT Blood Glucose.: 118 mg/dL (24 Mar 2023 08:58)  POCT Blood Glucose.: 194 mg/dL (23 Mar 2023 21:16)  POCT Blood Glucose.: 171 mg/dL (23 Mar 2023 19:29)  POCT Blood Glucose.: 249 mg/dL (23 Mar 2023 18:09)            PAST MEDICAL & SURGICAL HISTORY:  HTN (hypertension)      Sleep apnea      Diabetes mellitus      Chronic GERD      HLD (hyperlipidemia)      S/P knee replacement  Right (  )      History of endometrial ablation  2002      S/P  section  1977      History of unilateral oophorectomy            MEDICATIONS  (STANDING):  apixaban 10 milliGRAM(s) Oral every 12 hours  buMETAnide 1 milliGRAM(s) Oral daily  chlorhexidine 2% Cloths 1 Application(s) Topical daily  dextrose 5%. 1000 milliLiter(s) (50 mL/Hr) IV Continuous <Continuous>  dextrose 5%. 1000 milliLiter(s) (100 mL/Hr) IV Continuous <Continuous>  dextrose 50% Injectable 25 Gram(s) IV Push once  dextrose 50% Injectable 12.5 Gram(s) IV Push once  dextrose 50% Injectable 25 Gram(s) IV Push once  famotidine    Tablet 20 milliGRAM(s) Oral daily  folic acid 1 milliGRAM(s) Oral daily  glucagon  Injectable 1 milliGRAM(s) IntraMuscular once  insulin lispro (ADMELOG) corrective regimen sliding scale   SubCutaneous three times a day before meals  insulin lispro (ADMELOG) corrective regimen sliding scale   SubCutaneous at bedtime  lansoprazole Capsule 30 milliGRAM(s) Oral daily  spironolactone 25 milliGRAM(s) Oral daily    MEDICATIONS  (PRN):  acetaminophen     Tablet .. 650 milliGRAM(s) Oral every 6 hours PRN Temp greater or equal to 38C (100.4F), Mild Pain (1 - 3)  acetaminophen   IVPB .. 1000 milliGRAM(s) IV Intermittent once PRN Severe Pain (7 - 10)  cyclobenzaprine 5 milliGRAM(s) Oral every 12 hours PRN Muscle Spasm  dextrose Oral Gel 15 Gram(s) Oral once PRN Blood Glucose LESS THAN 70 milliGRAM(s)/deciliter  guaiFENesin Oral Liquid (Sugar-Free) 100 milliGRAM(s) Oral every 6 hours PRN Cough        RADIOLOGY & ADDITIONAL TESTS:    Imaging Personally Reviewed:  [ ] YES  [ ] NO    Consultant(s) Notes Reviewed:  [x ] YES  [ ] NO    PHYSICAL EXAM:  GENERAL: Alert and awake lying in bed in no distress  HEAD:  Atraumatic, Normocephalic  EYES: EOMI, DIO, conjunctiva and sclera clear  NECK: Supple, No JVD, Normal thyroid  NERVOUS SYSTEM:  Alert & Oriented X3, Motor and sensory systems are intact,   CHEST/LUNG: Bilateral clear breath sounds, no rhochi, no wheezing, no crepitations,  HEART: Regular rate and rhythm; No murmurs, rubs, or gallops  ABDOMEN: Soft, Nontender, Nondistended; Bowel sounds present  EXTREMITIES:   Peripheral Pulses are palpable, no  edema b/l upper ecchymosis noted,no tenderness        Care Discussed with Consultants/Other Providers [x ] YES  [ ] NO      Code Status: [] Full Code [] DNR [] DNI [] Goals of Care:   Disposition: [] ICU [] Stroke Unit [] RCU []PCU []Floor [] Discharge Home         BENNETT MillerP

## 2023-03-24 NOTE — DIETITIAN INITIAL EVALUATION ADULT - ADD RECOMMEND
1) Recommend continue DASH/TLC, CSTCHO diet   2) Monitor weights, labs, BM's, skin integrity, p.o. intake.   3) Encourage PO intake and honor food preferences as able.   4) Please document % PO intake in RN flowsheets

## 2023-03-24 NOTE — DIETITIAN INITIAL EVALUATION ADULT - PERTINENT MEDS FT
MEDICATIONS  (STANDING):  apixaban 10 milliGRAM(s) Oral every 12 hours  buMETAnide 1 milliGRAM(s) Oral daily  chlorhexidine 2% Cloths 1 Application(s) Topical daily  dextrose 5%. 1000 milliLiter(s) (50 mL/Hr) IV Continuous <Continuous>  dextrose 5%. 1000 milliLiter(s) (100 mL/Hr) IV Continuous <Continuous>  dextrose 50% Injectable 25 Gram(s) IV Push once  dextrose 50% Injectable 12.5 Gram(s) IV Push once  dextrose 50% Injectable 25 Gram(s) IV Push once  famotidine    Tablet 20 milliGRAM(s) Oral daily  folic acid 1 milliGRAM(s) Oral daily  glucagon  Injectable 1 milliGRAM(s) IntraMuscular once  insulin lispro (ADMELOG) corrective regimen sliding scale   SubCutaneous three times a day before meals  insulin lispro (ADMELOG) corrective regimen sliding scale   SubCutaneous at bedtime  lansoprazole Capsule 30 milliGRAM(s) Oral daily  spironolactone 25 milliGRAM(s) Oral daily    MEDICATIONS  (PRN):  acetaminophen     Tablet .. 650 milliGRAM(s) Oral every 6 hours PRN Temp greater or equal to 38C (100.4F), Mild Pain (1 - 3)  acetaminophen   IVPB .. 1000 milliGRAM(s) IV Intermittent once PRN Severe Pain (7 - 10)  cyclobenzaprine 5 milliGRAM(s) Oral every 12 hours PRN Muscle Spasm  dextrose Oral Gel 15 Gram(s) Oral once PRN Blood Glucose LESS THAN 70 milliGRAM(s)/deciliter  guaiFENesin Oral Liquid (Sugar-Free) 100 milliGRAM(s) Oral every 6 hours PRN Cough

## 2023-03-25 LAB
ANION GAP SERPL CALC-SCNC: 10 MMOL/L — SIGNIFICANT CHANGE UP (ref 7–14)
BUN SERPL-MCNC: 35 MG/DL — HIGH (ref 7–23)
CALCIUM SERPL-MCNC: 10.4 MG/DL — SIGNIFICANT CHANGE UP (ref 8.4–10.5)
CHLORIDE SERPL-SCNC: 99 MMOL/L — SIGNIFICANT CHANGE UP (ref 98–107)
CO2 SERPL-SCNC: 28 MMOL/L — SIGNIFICANT CHANGE UP (ref 22–31)
CREAT SERPL-MCNC: 1.48 MG/DL — HIGH (ref 0.5–1.3)
EGFR: 36 ML/MIN/1.73M2 — LOW
GLUCOSE BLDC GLUCOMTR-MCNC: 115 MG/DL — HIGH (ref 70–99)
GLUCOSE BLDC GLUCOMTR-MCNC: 119 MG/DL — HIGH (ref 70–99)
GLUCOSE BLDC GLUCOMTR-MCNC: 137 MG/DL — HIGH (ref 70–99)
GLUCOSE BLDC GLUCOMTR-MCNC: 179 MG/DL — HIGH (ref 70–99)
GLUCOSE SERPL-MCNC: 121 MG/DL — HIGH (ref 70–99)
MAGNESIUM SERPL-MCNC: 2.2 MG/DL — SIGNIFICANT CHANGE UP (ref 1.6–2.6)
PHOSPHATE SERPL-MCNC: 3.5 MG/DL — SIGNIFICANT CHANGE UP (ref 2.5–4.5)
POTASSIUM SERPL-MCNC: 4.6 MMOL/L — SIGNIFICANT CHANGE UP (ref 3.5–5.3)
POTASSIUM SERPL-SCNC: 4.6 MMOL/L — SIGNIFICANT CHANGE UP (ref 3.5–5.3)
SODIUM SERPL-SCNC: 137 MMOL/L — SIGNIFICANT CHANGE UP (ref 135–145)

## 2023-03-25 RX ADMIN — Medication 1 MILLIGRAM(S): at 12:37

## 2023-03-25 RX ADMIN — Medication 650 MILLIGRAM(S): at 08:10

## 2023-03-25 RX ADMIN — SPIRONOLACTONE 25 MILLIGRAM(S): 25 TABLET, FILM COATED ORAL at 06:05

## 2023-03-25 RX ADMIN — Medication 100 MILLIGRAM(S): at 08:10

## 2023-03-25 RX ADMIN — Medication 650 MILLIGRAM(S): at 08:40

## 2023-03-25 RX ADMIN — Medication 650 MILLIGRAM(S): at 21:57

## 2023-03-25 RX ADMIN — APIXABAN 10 MILLIGRAM(S): 2.5 TABLET, FILM COATED ORAL at 17:30

## 2023-03-25 RX ADMIN — BUMETANIDE 1 MILLIGRAM(S): 0.25 INJECTION INTRAMUSCULAR; INTRAVENOUS at 06:05

## 2023-03-25 RX ADMIN — APIXABAN 10 MILLIGRAM(S): 2.5 TABLET, FILM COATED ORAL at 06:05

## 2023-03-25 RX ADMIN — LANSOPRAZOLE 30 MILLIGRAM(S): 15 CAPSULE, DELAYED RELEASE ORAL at 12:38

## 2023-03-25 RX ADMIN — FAMOTIDINE 10 MILLIGRAM(S): 10 INJECTION INTRAVENOUS at 12:37

## 2023-03-25 RX ADMIN — Medication 100 MILLIGRAM(S): at 21:58

## 2023-03-25 RX ADMIN — CHLORHEXIDINE GLUCONATE 1 APPLICATION(S): 213 SOLUTION TOPICAL at 12:37

## 2023-03-25 RX ADMIN — Medication 650 MILLIGRAM(S): at 22:35

## 2023-03-25 NOTE — DOWNTIME INTERRUPTION NOTE - WHICH MANUAL FORMS INITIATED?
See paper chart for clinical documentation recorded during [facility] downtime.
Due to the [facility] bundled upgrade / Downtime 03/24-03/25/2023 all the notes and flow sheets related to Respiratory Care Services provided to this patient related to mechanical ventilation, Noninvasive Ventilation (NIV) and High Flow Nasal Cannula (HFNC) during this [facility] downtime are filed in patient’s paper chart.

## 2023-03-25 NOTE — PROGRESS NOTE ADULT - SUBJECTIVE AND OBJECTIVE BOX
TORI WOOTEN  77y  Female      Patient is a 77y old  Female who presents with a chief complaint of Other pulmonary embolism without acute cor pulmonale  comfortable,nad,denies pain in arms.b/l upper arm ecchymosis reported       REVIEW OF SYSTEMS:  CONSTITUTIONAL: No fever  RESPIRATORY: No cough, hemoptysis or shortness of breath  CARDIOVASCULAR: No chest pain, palpitations, dizziness, or leg swelling  GASTROINTESTINAL: No abdominal pain. nausea, vomiting, hematemesis  GENITOURINARY: No dysuria, frequency, hematuria   NEUROLOGICAL: No headaches, no dizziness  MUSCULOSKELETAL: No joint pain or swelling;   MEDICATIONS  (STANDING):  apixaban 10 milliGRAM(s) Oral every 12 hours  buMETAnide 1 milliGRAM(s) Oral daily  chlorhexidine 2% Cloths 1 Application(s) Topical daily  dextrose 5%. 1000 milliLiter(s) (50 mL/Hr) IV Continuous <Continuous>  dextrose 5%. 1000 milliLiter(s) (100 mL/Hr) IV Continuous <Continuous>  dextrose 50% Injectable 25 Gram(s) IV Push once  dextrose 50% Injectable 12.5 Gram(s) IV Push once  dextrose 50% Injectable 25 Gram(s) IV Push once  famotidine    Tablet 10 milliGRAM(s) Oral daily  folic acid 1 milliGRAM(s) Oral daily  glucagon  Injectable 1 milliGRAM(s) IntraMuscular once  insulin lispro (ADMELOG) corrective regimen sliding scale   SubCutaneous three times a day before meals  insulin lispro (ADMELOG) corrective regimen sliding scale   SubCutaneous at bedtime  lansoprazole Capsule 30 milliGRAM(s) Oral daily  spironolactone 25 milliGRAM(s) Oral daily    MEDICATIONS  (PRN):  acetaminophen     Tablet .. 650 milliGRAM(s) Oral every 6 hours PRN Temp greater or equal to 38C (100.4F), Mild Pain (1 - 3)  acetaminophen   IVPB .. 1000 milliGRAM(s) IV Intermittent once PRN Severe Pain (7 - 10)  cyclobenzaprine 5 milliGRAM(s) Oral every 12 hours PRN Muscle Spasm  dextrose Oral Gel 15 Gram(s) Oral once PRN Blood Glucose LESS THAN 70 milliGRAM(s)/deciliter  guaiFENesin Oral Liquid (Sugar-Free) 100 milliGRAM(s) Oral every 6 hours PRN Cough    Vital Signs Last 24 Hrs  T(C): 36.7 (23 @ 20:34), Max: 36.7 (23 @ 12:30)  T(F): 98.1 (23 @ 20:34), Max: 98.1 (23 @ 12:30)  HR: 86 (23 @ 22:47) (86 - 101)  BP: 121/70 (23 @ 20:34) (121/70 - 139/84)  BP(mean): --  RR: 18 (23 @ 20:34) (18 - 18)  SpO2: 100% (23 @ 22:47) (95% - 100%)            PAST MEDICAL & SURGICAL HISTORY:  HTN (hypertension)      Sleep apnea      Diabetes mellitus      Chronic GERD      HLD (hyperlipidemia)      S/P knee replacement  Right (  )      History of endometrial ablation        S/P  section  1977      History of unilateral oophorectomy        RADIOLOGY & ADDITIONAL TESTS:    Imaging Personally Reviewed:  [ ] YES  [ ] NO    Consultant(s) Notes Reviewed:  [x ] YES  [ ] NO    PHYSICAL EXAM:  GENERAL: Alert and awake lying in bed in no distress  HEAD:  Atraumatic, Normocephalic  EYES: EOMI, DIO, conjunctiva and sclera clear  NECK: Supple, No JVD, Normal thyroid  NERVOUS SYSTEM:  Alert & Oriented X3, Motor and sensory systems are intact,   CHEST/LUNG: dec breath sounds at bases  HEART: Regular rate and rhythm; No murmurs, rubs, or gallops  ABDOMEN: Soft, Nontender, Nondistended; Bowel sounds present  EXTREMITIES:   Peripheral Pulses are palpable, no  edema b/l upper ecchymosis noted,no tenderness    LABS:      137  |  99  |  35<H>  ----------------------------<  121<H>  4.6   |  28  |  1.48<H>    Ca    10.4      25 Mar 2023 06:00  Phos  3.5       Mg     2.20           Creatinine Trend: 1.48 <--, 1.42 <--, 1.35 <--, 1.43 <--, 1.62 <--, 1.57 <--, 1.58 <--, 1.54 <--, 1.58 <--                        8.6    6.26  )-----------( 282      ( 24 Mar 2023 05:05 )             28.4     Urine Studies:  Urinalysis Basic - ( 21 Mar 2023 22:05 )    Color: Light Yellow / Appearance: Clear / S.019 / pH:   Gluc:  / Ketone: Negative  / Bili: Negative / Urobili: <2 mg/dL   Blood:  / Protein: Trace / Nitrite: Negative   Leuk Esterase: Negative / RBC: 1 /HPF / WBC 1 /HPF   Sq Epi:  / Non Sq Epi:  / Bacteria:       Creatinine, Random Urine: 157 mg/dL ( @ 22:05)  Protein/Creatinine Ratio Calculation: 0.1 Ratio ( @ 22:05)                  Care Discussed with Consultants/Other Providers [x ] YES  [ ] NO

## 2023-03-25 NOTE — PROGRESS NOTE ADULT - SUBJECTIVE AND OBJECTIVE BOX
PATIENT SEEN AND EXAMINED ON :- 3/25/23  DATE OF SERVICE:      3/25/23       Interim events noted,Labs ,Radiological studies and Cardiology tests reviewed .    MR#[id number]  PATIENT NAME:-[facility] COURSE: HPI:  77-year-old woman with a past medical history of HTN, HLD, GERD, DM 2, DURAN, paroxysmal A-fib(?) not on anticoagulation, remote history of bilateral knee replacements presents to the ED with worsening shortness of breath over the last 2 weeks.  Patient endorses worsening dyspnea on exertion.  Patient was noted to have an elevated D-dimer and sent to the ED by Cardiologist Dr. Irizarry  for further evaluation.  CT Scan of Chest shows acute pulmonary emboli in the right interlobar artery and right upper, middle, and lower lobar branches; acute pulmonary emboli within the left upper and lower lobar branches and suggestion of right heart strain.  On interview, patient states she developed shortness of breath 2 weeks ago.  She saw her pulmonologist, Dr. Valenzuela, on 3/13/22 and was informed she did not have a pulmonary cause for her SOB.  She followed up with her cardiologist, Dr. Irizarry, today and D-Dimers came back high and was directed to [facility] ED.  She denies recent long distance travel, recent trauma or surgery, smoking history, use of hormone replacement medications or a personal history of cancer.  She does report breast cancer of sister and mother.  She reports having a mammogram this past August with normal findings.  She denies fever, chills, recent sick contact, chest pain, abdominal pain N/V/D.  She does state she had a stress test and echocardiogram with Dr. Irizarry 3 years ago that was normal. She reports a remote history (10 yrs?) of Atrial Fibrillation but when stated on Cardizem she remained in SR was placed on Holter and her primary team did not continue anticoagulation. On assessment, patient A+OX3 in no acute distress, SR 90s, /60, RR 18, SATing 100% on 4L NC, lung sounds diminished at bases, Temp 98.4F, no JVD, bilateral pedal edema +1 with right leg calf pain.  Lab show elevated D-Dimer 2837, K+ 5.4 Creat 1.59, Troponins 35 ->35, pBNP 3049, Lactate 1.1.  CT Scan Chest shows acute pulmonary emboli in the right interlobar artery and right upper, middle, and lower lobar branches; cute pulmonary emboli within the left upper and lower lobar branches with suggestion of right heart strain; incidental finding of left lower lobe 4mm calcified granuloma.   (18 Mar 2023 12:40)      INTERIM EVENTS:Patient seen at bedside ,interim events noted.      PMH -reviewed admission note, no change since admission  HEART FAILURE: Acute[ ]Chronic[ ] Systolic[ ] Diastolic[ ] Combined Systolic and Diastolic[ ]  CAD[ ] CABG[ ] PCI[ ]  DEVICES[ ] PPM[ ] ICD[ ] ILR[ ]  ATRIAL FIBRILLATION[ ] Paroxysmal[ ] Permanent[ ] CHADS2-[  ]  JACKLYN[ ] CKD1[ ] CKD2[ ] CKD3[ ] CKD4[ ] ESRD[ ]  COPD[ ] HTN[ ]   DM[ ] Type1[ ] Type 2[ ]   CVA[ ] Paresis[ ]    AMBULATION: Assisted[ ] Cane/walker[ ] Independent[ ]    MEDICATIONS  (STANDING):  apixaban 10 milliGRAM(s) Oral every 12 hours  buMETAnide 1 milliGRAM(s) Oral daily  chlorhexidine 2% Cloths 1 Application(s) Topical daily  dextrose 5%. 1000 milliLiter(s) (50 mL/Hr) IV Continuous <Continuous>  dextrose 5%. 1000 milliLiter(s) (100 mL/Hr) IV Continuous <Continuous>  dextrose 50% Injectable 25 Gram(s) IV Push once  dextrose 50% Injectable 12.5 Gram(s) IV Push once  dextrose 50% Injectable 25 Gram(s) IV Push once  famotidine    Tablet 10 milliGRAM(s) Oral daily  folic acid 1 milliGRAM(s) Oral daily  glucagon  Injectable 1 milliGRAM(s) IntraMuscular once  insulin lispro (ADMELOG) corrective regimen sliding scale   SubCutaneous three times a day before meals  insulin lispro (ADMELOG) corrective regimen sliding scale   SubCutaneous at bedtime  lansoprazole Capsule 30 milliGRAM(s) Oral daily  spironolactone 25 milliGRAM(s) Oral daily    MEDICATIONS  (PRN):  acetaminophen     Tablet .. 650 milliGRAM(s) Oral every 6 hours PRN Temp greater or equal to 38C (100.4F), Mild Pain (1 - 3)  acetaminophen   IVPB .. 1000 milliGRAM(s) IV Intermittent once PRN Severe Pain (7 - 10)  cyclobenzaprine 5 milliGRAM(s) Oral every 12 hours PRN Muscle Spasm  dextrose Oral Gel 15 Gram(s) Oral once PRN Blood Glucose LESS THAN 70 milliGRAM(s)/deciliter  guaiFENesin Oral Liquid (Sugar-Free) 100 milliGRAM(s) Oral every 6 hours PRN Cough            REVIEW OF SYSTEMS:  Constitutional: [ ] fever, [ ]weight loss,  [ ]fatigue [ ]weight gain  Eyes: [ ] visual changes  Respiratory: [ ]shortness of breath;  [ ] cough, [ ]wheezing, [ ]chills, [ ]hemoptysis  Cardiovascular: [ ] chest pain, [ ]palpitations, [ ]dizziness,  [ ]leg swelling[ ]orthopnea[ ]PND  Gastrointestinal: [ ] abdominal pain, [ ]nausea, [ ]vomiting,  [ ]diarrhea [ ]Constipation [ ]Melena  Genitourinary: [ ] dysuria, [ ] hematuria [ ][place]za  Neurologic: [ ] headaches [ ] tremors[ ]weakness [ ]Paralysis Right[ ] Left[ ]  Skin: [ ] itching, [ ]burning, [ ] rashes  Endocrine: [ ] heat or cold intolerance  Musculoskeletal: [ ] joint pain or swelling; [ ] muscle, back, or extremity pain  Psychiatric: [ ] depression, [ ]anxiety, [ ]mood swings, or [ ]difficulty sleeping  Hematologic: [ ] easy bruising, [ ] bleeding gums    [ ] All remaining systems negative except as per above.   [ ]Unable to obtain.  [x] No change in ROS since admission      Vital Signs Last 24 Hrs  T(C): 36.7 (25 Mar 2023 20:34), Max: 36.7 (25 Mar 2023 12:30)  T(F): 98.1 (25 Mar 2023 20:34), Max: 98.1 (25 Mar 2023 12:30)  HR: 101 (25 Mar 2023 20:34) (88 - 101)  BP: 121/70 (25 Mar 2023 20:34) (121/70 - 139/84)  BP(mean): --  RR: 18 (25 Mar 2023 20:34) (18 - 18)  SpO2: 97% (25 Mar 2023 20:34) (95% - 100%)    Parameters below as of 25 Mar 2023 20:34  Patient On (Oxygen Delivery Method): nasal cannula  O2 Flow (L/min): 2    I&O's Summary    24 Mar 2023 07:01  -  25 Mar 2023 07:00  --------------------------------------------------------  IN: 200 mL / OUT: 0 mL / NET: 200 mL    25 Mar 2023 07:01  -  25 Mar 2023 21:37  --------------------------------------------------------  IN: 250 mL / OUT: 500 mL / NET: -250 mL        PHYSICAL EXAM:  General: No acute distress BMI-  HEENT: EOMI, PERRL  Neck: Supple, [ ] JVD  Lungs: Equal air entry bilaterally; [ ] rales [ ] wheezing [ ] rhonchi  Heart: Regular rate and rhythm; [x ] murmur   2/6 [ x] systolic [ ] diastolic [ ] radiation[ ] rubs [ ]  gallops  Abdomen: Nontender, bowel sounds present  Extremities: No clubbing, cyanosis, [ ] edema [ ]Pulses  equal and intact  Nervous system:  Alert & Oriented X3, no focal deficits  Psychiatric: Normal affect  Skin: No rashes or lesions    LABS:  03-25    137  |  99  |  35<H>  ----------------------------<  121<H>  4.6   |  28  |  1.48<H>    Ca    10.4      25 Mar 2023 06:00  Phos  3.5     03-25  Mg     2.20     03-25      Creatinine Trend: 1.48<--, 1.42<--, 1.35<--, 1.43<--, 1.62<--, 1.57<--                        8.6    6.26  )-----------( 282      ( 24 Mar 2023 05:05 )             28.4

## 2023-03-25 NOTE — PROGRESS NOTE ADULT - ASSESSMENT
77y Female with history of long standing HTN and DM presents with SOB. Nephrology consulted for elevated Scr.    1) CKD-3b: Baseline Scr 1.4-1.6 as per PMD with CKD-3b in setting of long standing HTN and DM. Scr @ baseline. UA bland without proteinuria. Renal US with bilateral simple cysts. Outpatient CKD work up. Avoid nephrotoxins. Monitor electrolytes.    2) HTN with CKD: BP controlled. Holding home olmesartan but will resume if BP increases. Monitor BP.    3) LE edema: Patient drinking excessively and advised to restrict fluid intake to 1.2L daily. Continue with bumex 1 mg PO daily and aldactone 25 mg daily. TTE with normal LVSF. Monitor UO.    4) PE: As per primary team.        [facility] NEPHROLOGY  Eric Acosta M.D.  Mitch Tatum D.O.  Paty Lama M.D.  Yanci Mirza, CHARLI, ANP-C    Telephone: [phone]  Facsimile: [phone]    [address] [place] [address]

## 2023-03-25 NOTE — PROGRESS NOTE ADULT - SUBJECTIVE AND OBJECTIVE BOX
[facility] NEPHROLOGY- PROGRESS NOTE    77y Female with history of long standing HTN and DM presents with SOB. Nephrology consulted for elevated Scr.    REVIEW OF SYSTEMS:  Gen: no fevers  Cards: no chest pain  Resp: + dyspnea improving  GI: no nausea or vomiting or diarrhea  Vascular: + LE edema    codeine (Other)  penicillin (Blisters)      Hospital Medications: Medications reviewed    VITALS:  T(F): 99.5 (23 @ 19:26), Max: 99.5 (23 @ 19:26)  HR: 106 (23 @ 19:26)  BP: 145/79 (23 @ 19:26)  RR: 18 (23 @ 19:26)  SpO2: 93% (23 @ 19:26)  Wt(kg): --     @ 07:01  -   @ 07:00  --------------------------------------------------------  IN: 200 mL / OUT: 0 mL / NET: 200 mL      PHYSICAL EXAM:    Gen: NAD, calm  Cards: RRR, +S1/S2, no M/G/R  Resp: CTA B/L  GI: soft, NT/ND, NABS  Vascular: 1+ LE edema B/L      LABS:      137  |  99  |  35<H>  ----------------------------<  121<H>  4.6   |  28  |  1.48<H>    Ca    10.4      25 Mar 2023 06:00  Phos  3.5       Mg     2.20           Creatinine Trend: 1.48 <--, 1.42 <--, 1.35 <--, 1.43 <--, 1.62 <--, 1.57 <--, 1.58 <--, 1.54 <--, 1.58 <--                        8.6    6.26  )-----------( 282      ( 24 Mar 2023 05:05 )             28.4     Urine Studies:  Urinalysis Basic - ( 21 Mar 2023 22:05 )    Color: Light Yellow / Appearance: Clear / S.019 / pH:   Gluc:  / Ketone: Negative  / Bili: Negative / Urobili: <2 mg/dL   Blood:  / Protein: Trace / Nitrite: Negative   Leuk Esterase: Negative / RBC: 1 /HPF / WBC 1 /HPF   Sq Epi:  / Non Sq Epi:  / Bacteria:       Creatinine, Random Urine: 157 mg/dL ( @ 22:05)  Protein/Creatinine Ratio Calculation: 0.1 Ratio ( @ 22:05)

## 2023-03-26 LAB
GLUCOSE BLDC GLUCOMTR-MCNC: 121 MG/DL — HIGH (ref 70–99)
GLUCOSE BLDC GLUCOMTR-MCNC: 131 MG/DL — HIGH (ref 70–99)
GLUCOSE BLDC GLUCOMTR-MCNC: 167 MG/DL — HIGH (ref 70–99)
HCT VFR BLD CALC: 32.6 % — LOW (ref 34.5–45)
HGB BLD-MCNC: 9.6 G/DL — LOW (ref 11.5–15.5)
MCHC RBC-ENTMCNC: 26.2 PG — LOW (ref 27–34)
MCHC RBC-ENTMCNC: 29.4 GM/DL — LOW (ref 32–36)
MCV RBC AUTO: 88.8 FL — SIGNIFICANT CHANGE UP (ref 80–100)
NRBC # BLD: 0 /100 WBCS — SIGNIFICANT CHANGE UP (ref 0–0)
NRBC # FLD: 0.02 K/UL — HIGH (ref 0–0)
PLATELET # BLD AUTO: 393 K/UL — SIGNIFICANT CHANGE UP (ref 150–400)
RBC # BLD: 3.67 M/UL — LOW (ref 3.8–5.2)
RBC # FLD: 15.9 % — HIGH (ref 10.3–14.5)
WBC # BLD: 6.97 K/UL — SIGNIFICANT CHANGE UP (ref 3.8–10.5)
WBC # FLD AUTO: 6.97 K/UL — SIGNIFICANT CHANGE UP (ref 3.8–10.5)

## 2023-03-26 RX ADMIN — APIXABAN 10 MILLIGRAM(S): 2.5 TABLET, FILM COATED ORAL at 05:32

## 2023-03-26 RX ADMIN — CHLORHEXIDINE GLUCONATE 1 APPLICATION(S): 213 SOLUTION TOPICAL at 11:29

## 2023-03-26 RX ADMIN — SPIRONOLACTONE 25 MILLIGRAM(S): 25 TABLET, FILM COATED ORAL at 05:32

## 2023-03-26 RX ADMIN — Medication 650 MILLIGRAM(S): at 08:45

## 2023-03-26 RX ADMIN — Medication 100 MILLIGRAM(S): at 22:26

## 2023-03-26 RX ADMIN — LANSOPRAZOLE 30 MILLIGRAM(S): 15 CAPSULE, DELAYED RELEASE ORAL at 11:29

## 2023-03-26 RX ADMIN — FAMOTIDINE 10 MILLIGRAM(S): 10 INJECTION INTRAVENOUS at 11:28

## 2023-03-26 RX ADMIN — Medication 1 MILLIGRAM(S): at 11:29

## 2023-03-26 RX ADMIN — Medication 100 MILLIGRAM(S): at 06:37

## 2023-03-26 RX ADMIN — APIXABAN 10 MILLIGRAM(S): 2.5 TABLET, FILM COATED ORAL at 17:29

## 2023-03-26 RX ADMIN — BUMETANIDE 1 MILLIGRAM(S): 0.25 INJECTION INTRAMUSCULAR; INTRAVENOUS at 05:32

## 2023-03-26 NOTE — PROGRESS NOTE ADULT - SUBJECTIVE AND OBJECTIVE BOX
TORI WOOTEN  77y  Female      Patient is a 77y old  Female who presents with a chief complaint of sob (26 Mar 2023 11:01)  c/o pain in rt.arm near elbow area.no recent fall.no sob,no cp,no fever,no cough    REVIEW OF SYSTEMS:  CONSTITUTIONAL: No fever  RESPIRATORY: No cough, hemoptysis or shortness of breath  CARDIOVASCULAR: No chest pain, palpitations, dizziness, or leg swelling  GASTROINTESTINAL: No abdominal pain. nausea, vomiting, hematemesis  GENITOURINARY: No dysuria, frequency, hematuria   NEUROLOGICAL: No headaches, no dizziness  MUSCULOSKELETAL: No joint pain or swelling;     INTERVAL HPI/OVERNIGHT EVENTS:  T(C): 36.7 (23 @ 19:59), Max: 36.7 (23 @ 00:34)  HR: 100 (23 @ 19:59) (77 - 100)  BP: 137/65 (23 @ 19:59) (137/65 - 144/77)  RR: 18 (23 @ 19:59) (18 - 18)  SpO2: 100% (23 @ 19:59) (97% - 100%)  Wt(kg): --  I&O's Summary    25 Mar 2023 07:01  -  26 Mar 2023 07:00  --------------------------------------------------------  IN: 250 mL / OUT: 500 mL / NET: -250 mL      T(C): 36.7 (23 @ 19:59), Max: 36.7 (23 @ 00:34)  HR: 100 (23 @ 19:59) (77 - 100)  BP: 137/65 (23 @ 19:59) (137/65 - 144/77)  RR: 18 (23 @ 19:59) (18 - 18)  SpO2: 100% (23 @ 19:59) (97% - 100%)  Wt(kg): --Vital Signs Last 24 Hrs  T(C): 36.7 (26 Mar 2023 19:59), Max: 36.7 (26 Mar 2023 00:34)  T(F): 98 (26 Mar 2023 19:59), Max: 98 (26 Mar 2023 00:34)  HR: 100 (26 Mar 2023 19:59) (77 - 100)  BP: 137/65 (26 Mar 2023 19:59) (137/65 - 144/77)  BP(mean): --  RR: 18 (26 Mar 2023 19:59) (18 - 18)  SpO2: 100% (26 Mar 2023 19:59) (97% - 100%)    Parameters below as of 26 Mar 2023 19:59  Patient On (Oxygen Delivery Method): nasal cannula  O2 Flow (L/min): 2      LABS:                        9.6    6.97  )-----------( 393      ( 26 Mar 2023 07:07 )             32.6     03-25    137  |  99  |  35<H>  ----------------------------<  121<H>  4.6   |  28  |  1.48<H>    Ca    10.4      25 Mar 2023 06:00  Phos  3.5     03-25  Mg     2.20     03-25          CAPILLARY BLOOD GLUCOSE      POCT Blood Glucose.: 131 mg/dL (26 Mar 2023 17:54)  POCT Blood Glucose.: 121 mg/dL (26 Mar 2023 08:29)            PAST MEDICAL & SURGICAL HISTORY:  HTN (hypertension)      Sleep apnea      Diabetes mellitus      Chronic GERD      HLD (hyperlipidemia)      S/P knee replacement  Right (  )      History of endometrial ablation  2002      S/P  section  1977      History of unilateral oophorectomy            MEDICATIONS  (STANDING):  apixaban 10 milliGRAM(s) Oral every 12 hours  buMETAnide 1 milliGRAM(s) Oral daily  chlorhexidine 2% Cloths 1 Application(s) Topical daily  dextrose 5%. 1000 milliLiter(s) (50 mL/Hr) IV Continuous <Continuous>  dextrose 5%. 1000 milliLiter(s) (100 mL/Hr) IV Continuous <Continuous>  dextrose 50% Injectable 25 Gram(s) IV Push once  dextrose 50% Injectable 12.5 Gram(s) IV Push once  dextrose 50% Injectable 25 Gram(s) IV Push once  famotidine    Tablet 10 milliGRAM(s) Oral daily  folic acid 1 milliGRAM(s) Oral daily  glucagon  Injectable 1 milliGRAM(s) IntraMuscular once  insulin lispro (ADMELOG) corrective regimen sliding scale   SubCutaneous three times a day before meals  insulin lispro (ADMELOG) corrective regimen sliding scale   SubCutaneous at bedtime  lansoprazole Capsule 30 milliGRAM(s) Oral daily  spironolactone 25 milliGRAM(s) Oral daily    MEDICATIONS  (PRN):  acetaminophen     Tablet .. 650 milliGRAM(s) Oral every 6 hours PRN Temp greater or equal to 38C (100.4F), Mild Pain (1 - 3)  acetaminophen   IVPB .. 1000 milliGRAM(s) IV Intermittent once PRN Severe Pain (7 - 10)  cyclobenzaprine 5 milliGRAM(s) Oral every 12 hours PRN Muscle Spasm  dextrose Oral Gel 15 Gram(s) Oral once PRN Blood Glucose LESS THAN 70 milliGRAM(s)/deciliter  guaiFENesin Oral Liquid (Sugar-Free) 100 milliGRAM(s) Oral every 6 hours PRN Cough        RADIOLOGY & ADDITIONAL TESTS:    Imaging Personally Reviewed:  [ ] YES  [ ] NO    Consultant(s) Notes Reviewed:  [ ] YES  [ ] NO    PHYSICAL EXAM:  GENERAL: Alert and awake lying in bed in no distress  HEAD:  Atraumatic, Normocephalic  EYES: EOMI, DIO, conjunctiva and sclera clear  NECK: Supple, No JVD, Normal thyroid  NERVOUS SYSTEM:  Alert & Oriented X3, Motor and sensory systems are intact,   CHEST/LUNG: Bilateral clear breath sounds, no rhochi, no wheezing, no crepitations,  HEART: Regular rate and rhythm; No murmurs, rubs, or gallops  ABDOMEN: Soft, Nontender, Nondistended; Bowel sounds present  EXTREMITIES:   Peripheral Pulses are palpable, no  edema.diffuse exts on b/l upper arms.no tenderness on rt.arm        Care Discussed with Consultants/Other Providers [ cx YES  [ ] NO      Code Status: [] Full Code [] DNR [] DNI [] Goals of Care:   Disposition: [] ICU [] Stroke Unit [] RCU []PCU []Floor [] Discharge Home         BENNETT MillerP

## 2023-03-26 NOTE — PROGRESS NOTE ADULT - SUBJECTIVE AND OBJECTIVE BOX
[facility] NEPHROLOGY- PROGRESS NOTE    77y Female with history of long standing HTN and DM presents with SOB. Nephrology consulted for elevated Scr.    REVIEW OF SYSTEMS:  Gen: no fevers  Cards: no chest pain  Resp: + dyspnea improving  GI: no nausea or vomiting or diarrhea  Vascular: + LE edema    codeine (Other)  penicillin (Blisters)      Hospital Medications: Medications reviewed    VITALS:  T(F): 97.7 (23 @ 04:34), Max: 98.1 (23 @ 12:30)  HR: 88 (23 @ 07:18)  BP: 139/77 (23 @ 04:34)  RR: 18 (23 @ 04:34)  SpO2: 97% (23 @ 07:18)  Wt(kg): --     @ 07:01  -   @ 07:00  --------------------------------------------------------  IN: 250 mL / OUT: 500 mL / NET: -250 mL          PHYSICAL EXAM:  Gen: NAD, calm  Cards: RRR, +S1/S2, no M/G/R  Resp: CTA B/L  GI: soft, NT/ND, NABS  Vascular: 1+ LE edema B/L      LABS:      137  |  99  |  35<H>  ----------------------------<  121<H>  4.6   |  28  |  1.48<H>    Ca    10.4      25 Mar 2023 06:00  Phos  3.5       Mg     2.20           Creatinine Trend: 1.48 <--, 1.42 <--, 1.35 <--, 1.43 <--, 1.62 <--, 1.57 <--, 1.58 <--, 1.54 <--                        9.6    6.97  )-----------( 393      ( 26 Mar 2023 07:07 )             32.6     Urine Studies:  Urinalysis Basic - ( 21 Mar 2023 22:05 )    Color: Light Yellow / Appearance: Clear / S.019 / pH:   Gluc:  / Ketone: Negative  / Bili: Negative / Urobili: <2 mg/dL   Blood:  / Protein: Trace / Nitrite: Negative   Leuk Esterase: Negative / RBC: 1 /HPF / WBC 1 /HPF   Sq Epi:  / Non Sq Epi:  / Bacteria:       Creatinine, Random Urine: 157 mg/dL ( @ 22:05)  Protein/Creatinine Ratio Calculation: 0.1 Ratio ( @ 22:05)

## 2023-03-26 NOTE — PROGRESS NOTE ADULT - SUBJECTIVE AND OBJECTIVE BOX
PATIENT SEEN AND EXAMINED ON :- 3/26/23  DATE OF SERVICE:  3/26/23           Interim events noted,Labs ,Radiological studies and Cardiology tests reviewed .    MR#[id number]  PATIENT NAME:-[facility] COURSE: HPI:  77-year-old woman with a past medical history of HTN, HLD, GERD, DM 2, DURAN, paroxysmal A-fib(?) not on anticoagulation, remote history of bilateral knee replacements presents to the ED with worsening shortness of breath over the last 2 weeks.  Patient endorses worsening dyspnea on exertion.  Patient was noted to have an elevated D-dimer and sent to the ED by Cardiologist Dr. Irizarry  for further evaluation.  CT Scan of Chest shows acute pulmonary emboli in the right interlobar artery and right upper, middle, and lower lobar branches; acute pulmonary emboli within the left upper and lower lobar branches and suggestion of right heart strain.  On interview, patient states she developed shortness of breath 2 weeks ago.  She saw her pulmonologist, Dr. Valenzuela, on 3/13/22 and was informed she did not have a pulmonary cause for her SOB.  She followed up with her cardiologist, Dr. Irizarry, today and D-Dimers came back high and was directed to [facility] ED.  She denies recent long distance travel, recent trauma or surgery, smoking history, use of hormone replacement medications or a personal history of cancer.  She does report breast cancer of sister and mother.  She reports having a mammogram this past August with normal findings.  She denies fever, chills, recent sick contact, chest pain, abdominal pain N/V/D.  She does state she had a stress test and echocardiogram with Dr. Irizarry 3 years ago that was normal. She reports a remote history (10 yrs?) of Atrial Fibrillation but when stated on Cardizem she remained in SR was placed on Holter and her primary team did not continue anticoagulation. On assessment, patient A+OX3 in no acute distress, SR 90s, /60, RR 18, SATing 100% on 4L NC, lung sounds diminished at bases, Temp 98.4F, no JVD, bilateral pedal edema +1 with right leg calf pain.  Lab show elevated D-Dimer 2837, K+ 5.4 Creat 1.59, Troponins 35 ->35, pBNP 3049, Lactate 1.1.  CT Scan Chest shows acute pulmonary emboli in the right interlobar artery and right upper, middle, and lower lobar branches; cute pulmonary emboli within the left upper and lower lobar branches with suggestion of right heart strain; incidental finding of left lower lobe 4mm calcified granuloma.   (18 Mar 2023 12:40)      INTERIM EVENTS:Patient seen at bedside ,interim events noted.      PMH -reviewed admission note, no change since admission  HEART FAILURE: Acute[ ]Chronic[ ] Systolic[ ] Diastolic[ ] Combined Systolic and Diastolic[ ]  CAD[ ] CABG[ ] PCI[ ]  DEVICES[ ] PPM[ ] ICD[ ] ILR[ ]  ATRIAL FIBRILLATION[ ] Paroxysmal[ ] Permanent[ ] CHADS2-[  ]  JACKLYN[ ] CKD1[ ] CKD2[ ] CKD3[ ] CKD4[ ] ESRD[ ]  COPD[ ] HTN[ ]   DM[ ] Type1[ ] Type 2[ ]   CVA[ ] Paresis[ ]    AMBULATION: Assisted[ ] Cane/walker[ ] Independent[ ]    MEDICATIONS  (STANDING):  apixaban 10 milliGRAM(s) Oral every 12 hours  buMETAnide 1 milliGRAM(s) Oral daily  chlorhexidine 2% Cloths 1 Application(s) Topical daily  dextrose 5%. 1000 milliLiter(s) (50 mL/Hr) IV Continuous <Continuous>  dextrose 5%. 1000 milliLiter(s) (100 mL/Hr) IV Continuous <Continuous>  dextrose 50% Injectable 25 Gram(s) IV Push once  dextrose 50% Injectable 12.5 Gram(s) IV Push once  dextrose 50% Injectable 25 Gram(s) IV Push once  famotidine    Tablet 10 milliGRAM(s) Oral daily  folic acid 1 milliGRAM(s) Oral daily  glucagon  Injectable 1 milliGRAM(s) IntraMuscular once  insulin lispro (ADMELOG) corrective regimen sliding scale   SubCutaneous three times a day before meals  insulin lispro (ADMELOG) corrective regimen sliding scale   SubCutaneous at bedtime  lansoprazole Capsule 30 milliGRAM(s) Oral daily  spironolactone 25 milliGRAM(s) Oral daily    MEDICATIONS  (PRN):  acetaminophen     Tablet .. 650 milliGRAM(s) Oral every 6 hours PRN Temp greater or equal to 38C (100.4F), Mild Pain (1 - 3)  acetaminophen   IVPB .. 1000 milliGRAM(s) IV Intermittent once PRN Severe Pain (7 - 10)  cyclobenzaprine 5 milliGRAM(s) Oral every 12 hours PRN Muscle Spasm  dextrose Oral Gel 15 Gram(s) Oral once PRN Blood Glucose LESS THAN 70 milliGRAM(s)/deciliter  guaiFENesin Oral Liquid (Sugar-Free) 100 milliGRAM(s) Oral every 6 hours PRN Cough            REVIEW OF SYSTEMS:  Constitutional: [ ] fever, [ ]weight loss,  [ ]fatigue [ ]weight gain  Eyes: [ ] visual changes  Respiratory: [ ]shortness of breath;  [ ] cough, [ ]wheezing, [ ]chills, [ ]hemoptysis  Cardiovascular: [ ] chest pain, [ ]palpitations, [ ]dizziness,  [ ]leg swelling[ ]orthopnea[ ]PND  Gastrointestinal: [ ] abdominal pain, [ ]nausea, [ ]vomiting,  [ ]diarrhea [ ]Constipation [ ]Melena  Genitourinary: [ ] dysuria, [ ] hematuria [ ]Zaragoza  Neurologic: [ ] headaches [ ] tremors[ ]weakness [ ]Paralysis Right[ ] Left[ ]  Skin: [ ] itching, [ ]burning, [ ] rashes  Endocrine: [ ] heat or cold intolerance  Musculoskeletal: [ ] joint pain or swelling; [ ] muscle, back, or extremity pain  Psychiatric: [ ] depression, [ ]anxiety, [ ]mood swings, or [ ]difficulty sleeping  Hematologic: [ ] easy bruising, [ ] bleeding gums    [ ] All remaining systems negative except as per above.   [ ]Unable to obtain.  [x] No change in ROS since admission      Vital Signs Last 24 Hrs  T(C): 36.7 (26 Mar 2023 19:59), Max: 36.7 (26 Mar 2023 00:34)  T(F): 98 (26 Mar 2023 19:59), Max: 98 (26 Mar 2023 00:34)  HR: 100 (26 Mar 2023 19:59) (77 - 100)  BP: 137/65 (26 Mar 2023 19:59) (137/65 - 144/77)  BP(mean): --  RR: 18 (26 Mar 2023 19:59) (18 - 18)  SpO2: 100% (26 Mar 2023 19:59) (97% - 100%)    Parameters below as of 26 Mar 2023 19:59  Patient On (Oxygen Delivery Method): nasal cannula  O2 Flow (L/min): 2    I&O's Summary    25 Mar 2023 07:01  -  26 Mar 2023 07:00  --------------------------------------------------------  IN: 250 mL / OUT: 500 mL / NET: -250 mL        PHYSICAL EXAM:  General: No acute distress BMI-  HEENT: EOMI, PERRL  Neck: Supple, [ ] JVD  Lungs: Equal air entry bilaterally; [ ] rales [ ] wheezing [ ] rhonchi  Heart: Regular rate and rhythm; [x ] murmur   2/6 [ x] systolic [ ] diastolic [ ] radiation[ ] rubs [ ]  gallops  Abdomen: Nontender, bowel sounds present  Extremities: No clubbing, cyanosis, [ ] edema [ ]Pulses  equal and intact  Nervous system:  Alert & Oriented X3, no focal deficits  Psychiatric: Normal affect  Skin: No rashes or lesions    LABS:  03-25    137  |  99  |  35<H>  ----------------------------<  121<H>  4.6   |  28  |  1.48<H>    Ca    10.4      25 Mar 2023 06:00  Phos  3.5     03-25  Mg     2.20     03-25      Creatinine Trend: 1.48<--, 1.42<--, 1.35<--, 1.43<--, 1.62<--, 1.57<--                        9.6    6.97  )-----------( 393      ( 26 Mar 2023 07:07 )             32.6

## 2023-03-26 NOTE — PROGRESS NOTE ADULT - ASSESSMENT
77y Female with history of long standing HTN and DM presents with SOB. Nephrology consulted for elevated Scr.    1) CKD-3b: Baseline Scr 1.4-1.6 as per PMD with CKD-3b in setting of long standing HTN and DM. Scr @ baseline. UA bland without proteinuria. Renal US with bilateral simple cysts. Outpatient CKD work up. Avoid nephrotoxins. Monitor electrolytes.    2) HTN with CKD: BP controlled. Holding home olmesartan but will resume if BP increases. Monitor BP.    3) LE edema: Patient drinking excessively and advised to restrict fluid intake to 1.2L daily. Continue with bumex 1 mg PO daily and aldactone 25 mg daily. TTE with normal LVSF. Monitor UO.    4) PE: As per primary team.        [facility] NEPHROLOGY  Eric Acosta M.D.  Mitch Tatum D.O.  Paty Lama M.D.  Yanci Mirza, CHARLI, ANP-C    Telephone: [phone]  Facsimile: [phone]    [address] [place] [address]

## 2023-03-27 LAB
ALBUMIN SERPL ELPH-MCNC: 3.7 G/DL — SIGNIFICANT CHANGE UP (ref 3.3–5)
ALP SERPL-CCNC: 47 U/L — SIGNIFICANT CHANGE UP (ref 40–120)
ALT FLD-CCNC: 15 U/L — SIGNIFICANT CHANGE UP (ref 4–33)
ANION GAP SERPL CALC-SCNC: 11 MMOL/L — SIGNIFICANT CHANGE UP (ref 7–14)
AST SERPL-CCNC: 14 U/L — SIGNIFICANT CHANGE UP (ref 4–32)
BILIRUB SERPL-MCNC: 0.4 MG/DL — SIGNIFICANT CHANGE UP (ref 0.2–1.2)
BUN SERPL-MCNC: 40 MG/DL — HIGH (ref 7–23)
CALCIUM SERPL-MCNC: 10.3 MG/DL — SIGNIFICANT CHANGE UP (ref 8.4–10.5)
CHLORIDE SERPL-SCNC: 101 MMOL/L — SIGNIFICANT CHANGE UP (ref 98–107)
CO2 SERPL-SCNC: 28 MMOL/L — SIGNIFICANT CHANGE UP (ref 22–31)
CREAT SERPL-MCNC: 1.75 MG/DL — HIGH (ref 0.5–1.3)
EGFR: 29 ML/MIN/1.73M2 — LOW
GLUCOSE BLDC GLUCOMTR-MCNC: 105 MG/DL — HIGH (ref 70–99)
GLUCOSE BLDC GLUCOMTR-MCNC: 125 MG/DL — HIGH (ref 70–99)
GLUCOSE BLDC GLUCOMTR-MCNC: 142 MG/DL — HIGH (ref 70–99)
GLUCOSE BLDC GLUCOMTR-MCNC: 160 MG/DL — HIGH (ref 70–99)
GLUCOSE SERPL-MCNC: 138 MG/DL — HIGH (ref 70–99)
HCT VFR BLD CALC: 29.9 % — LOW (ref 34.5–45)
HGB BLD-MCNC: 8.7 G/DL — LOW (ref 11.5–15.5)
MAGNESIUM SERPL-MCNC: 2.2 MG/DL — SIGNIFICANT CHANGE UP (ref 1.6–2.6)
MCHC RBC-ENTMCNC: 26 PG — LOW (ref 27–34)
MCHC RBC-ENTMCNC: 29.1 GM/DL — LOW (ref 32–36)
MCV RBC AUTO: 89.3 FL — SIGNIFICANT CHANGE UP (ref 80–100)
NRBC # BLD: 0 /100 WBCS — SIGNIFICANT CHANGE UP (ref 0–0)
NRBC # FLD: 0 K/UL — SIGNIFICANT CHANGE UP (ref 0–0)
PHOSPHATE SERPL-MCNC: 3.7 MG/DL — SIGNIFICANT CHANGE UP (ref 2.5–4.5)
PLATELET # BLD AUTO: 370 K/UL — SIGNIFICANT CHANGE UP (ref 150–400)
POTASSIUM SERPL-MCNC: 4.5 MMOL/L — SIGNIFICANT CHANGE UP (ref 3.5–5.3)
POTASSIUM SERPL-SCNC: 4.5 MMOL/L — SIGNIFICANT CHANGE UP (ref 3.5–5.3)
PROT SERPL-MCNC: 6.3 G/DL — SIGNIFICANT CHANGE UP (ref 6–8.3)
RBC # BLD: 3.35 M/UL — LOW (ref 3.8–5.2)
RBC # FLD: 15.9 % — HIGH (ref 10.3–14.5)
SODIUM SERPL-SCNC: 140 MMOL/L — SIGNIFICANT CHANGE UP (ref 135–145)
WBC # BLD: 6.74 K/UL — SIGNIFICANT CHANGE UP (ref 3.8–10.5)
WBC # FLD AUTO: 6.74 K/UL — SIGNIFICANT CHANGE UP (ref 3.8–10.5)

## 2023-03-27 PROCEDURE — 93971 EXTREMITY STUDY: CPT | Mod: 26

## 2023-03-27 PROCEDURE — 99232 SBSQ HOSP IP/OBS MODERATE 35: CPT

## 2023-03-27 RX ORDER — TIOTROPIUM BROMIDE 18 UG/1
2 CAPSULE ORAL; RESPIRATORY (INHALATION) DAILY
Refills: 0 | Status: DISCONTINUED | OUTPATIENT
Start: 2023-03-27 | End: 2023-03-28

## 2023-03-27 RX ORDER — BUDESONIDE AND FORMOTEROL FUMARATE DIHYDRATE 160; 4.5 UG/1; UG/1
2 AEROSOL RESPIRATORY (INHALATION)
Refills: 0 | Status: DISCONTINUED | OUTPATIENT
Start: 2023-03-27 | End: 2023-03-28

## 2023-03-27 RX ADMIN — BUMETANIDE 1 MILLIGRAM(S): 0.25 INJECTION INTRAMUSCULAR; INTRAVENOUS at 05:06

## 2023-03-27 RX ADMIN — Medication 650 MILLIGRAM(S): at 17:29

## 2023-03-27 RX ADMIN — APIXABAN 10 MILLIGRAM(S): 2.5 TABLET, FILM COATED ORAL at 17:29

## 2023-03-27 RX ADMIN — BUDESONIDE AND FORMOTEROL FUMARATE DIHYDRATE 2 PUFF(S): 160; 4.5 AEROSOL RESPIRATORY (INHALATION) at 21:09

## 2023-03-27 RX ADMIN — Medication 650 MILLIGRAM(S): at 18:30

## 2023-03-27 RX ADMIN — Medication 100 MILLIGRAM(S): at 06:05

## 2023-03-27 RX ADMIN — Medication 1 MILLIGRAM(S): at 11:18

## 2023-03-27 RX ADMIN — LANSOPRAZOLE 30 MILLIGRAM(S): 15 CAPSULE, DELAYED RELEASE ORAL at 11:18

## 2023-03-27 RX ADMIN — Medication 650 MILLIGRAM(S): at 06:05

## 2023-03-27 RX ADMIN — CHLORHEXIDINE GLUCONATE 1 APPLICATION(S): 213 SOLUTION TOPICAL at 11:18

## 2023-03-27 RX ADMIN — SPIRONOLACTONE 25 MILLIGRAM(S): 25 TABLET, FILM COATED ORAL at 05:06

## 2023-03-27 RX ADMIN — APIXABAN 10 MILLIGRAM(S): 2.5 TABLET, FILM COATED ORAL at 05:07

## 2023-03-27 NOTE — PROGRESS NOTE ADULT - PROBLEM SELECTOR PROBLEM 5
PAF (paroxysmal atrial fibrillation)

## 2023-03-27 NOTE — PROGRESS NOTE ADULT - PROBLEM SELECTOR PROBLEM 1
Acute pulmonary embolism

## 2023-03-27 NOTE — PROGRESS NOTE ADULT - SUBJECTIVE AND OBJECTIVE BOX
PATIENT SEEN AND EXAMINED ON :- 3/27/23  DATE OF SERVICE:    3/27/23         Interim events noted,Labs ,Radiological studies and Cardiology tests reviewed .    MR#[id number]  PATIENT NAME:-[facility] COURSE: HPI:  77-year-old woman with a past medical history of HTN, HLD, GERD, DM 2, DURAN, paroxysmal A-fib(?) not on anticoagulation, remote history of bilateral knee replacements presents to the ED with worsening shortness of breath over the last 2 weeks.  Patient endorses worsening dyspnea on exertion.  Patient was noted to have an elevated D-dimer and sent to the ED by Cardiologist Dr. Irizarry  for further evaluation.  CT Scan of Chest shows acute pulmonary emboli in the right interlobar artery and right upper, middle, and lower lobar branches; acute pulmonary emboli within the left upper and lower lobar branches and suggestion of right heart strain.  On interview, patient states she developed shortness of breath 2 weeks ago.  She saw her pulmonologist, Dr. Valenzuela, on 3/13/22 and was informed she did not have a pulmonary cause for her SOB.  She followed up with her cardiologist, Dr. Irizarry, today and D-Dimers came back high and was directed to [facility] ED.  She denies recent long distance travel, recent trauma or surgery, smoking history, use of hormone replacement medications or a personal history of cancer.  She does report breast cancer of sister and mother.  She reports having a mammogram this past August with normal findings.  She denies fever, chills, recent sick contact, chest pain, abdominal pain N/V/D.  She does state she had a stress test and echocardiogram with Dr. Irizarry 3 years ago that was normal. She reports a remote history (10 yrs?) of Atrial Fibrillation but when stated on Cardizem she remained in SR was placed on Holter and her primary team did not continue anticoagulation. On assessment, patient A+OX3 in no acute distress, SR 90s, /60, RR 18, SATing 100% on 4L NC, lung sounds diminished at bases, Temp 98.4F, no JVD, bilateral pedal edema +1 with right leg calf pain.  Lab show elevated D-Dimer 2837, K+ 5.4 Creat 1.59, Troponins 35 ->35, pBNP 3049, Lactate 1.1.  CT Scan Chest shows acute pulmonary emboli in the right interlobar artery and right upper, middle, and lower lobar branches; cute pulmonary emboli within the left upper and lower lobar branches with suggestion of right heart strain; incidental finding of left lower lobe 4mm calcified granuloma.   (18 Mar 2023 12:40)      INTERIM EVENTS:Patient seen at bedside ,interim events noted.      PMH -reviewed admission note, no change since admission  HEART FAILURE: Acute[ ]Chronic[ ] Systolic[ ] Diastolic[ ] Combined Systolic and Diastolic[ ]  CAD[ ] CABG[ ] PCI[ ]  DEVICES[ ] PPM[ ] ICD[ ] ILR[ ]  ATRIAL FIBRILLATION[ ] Paroxysmal[ ] Permanent[ ] CHADS2-[  ]  JACKLYN[ ] CKD1[ ] CKD2[ ] CKD3[ ] CKD4[ ] ESRD[ ]  COPD[ ] HTN[ ]   DM[ ] Type1[ ] Type 2[ ]   CVA[ ] Paresis[ ]    AMBULATION: Assisted[ ] Cane/walker[ ] Independent[ ]    MEDICATIONS  (STANDING):  apixaban 10 milliGRAM(s) Oral every 12 hours  budesonide 160 MICROgram(s)/formoterol 4.5 MICROgram(s) Inhaler 2 Puff(s) Inhalation two times a day  buMETAnide 1 milliGRAM(s) Oral daily  chlorhexidine 2% Cloths 1 Application(s) Topical daily  dextrose 5%. 1000 milliLiter(s) (50 mL/Hr) IV Continuous <Continuous>  dextrose 5%. 1000 milliLiter(s) (100 mL/Hr) IV Continuous <Continuous>  dextrose 50% Injectable 25 Gram(s) IV Push once  dextrose 50% Injectable 12.5 Gram(s) IV Push once  dextrose 50% Injectable 25 Gram(s) IV Push once  famotidine    Tablet 10 milliGRAM(s) Oral daily  folic acid 1 milliGRAM(s) Oral daily  glucagon  Injectable 1 milliGRAM(s) IntraMuscular once  insulin lispro (ADMELOG) corrective regimen sliding scale   SubCutaneous three times a day before meals  insulin lispro (ADMELOG) corrective regimen sliding scale   SubCutaneous at bedtime  lansoprazole Capsule 30 milliGRAM(s) Oral daily  spironolactone 25 milliGRAM(s) Oral daily  tiotropium 2.5 MICROgram(s) Inhaler 2 Puff(s) Inhalation daily    MEDICATIONS  (PRN):  acetaminophen     Tablet .. 650 milliGRAM(s) Oral every 6 hours PRN Temp greater or equal to 38C (100.4F), Mild Pain (1 - 3)  acetaminophen   IVPB .. 1000 milliGRAM(s) IV Intermittent once PRN Severe Pain (7 - 10)  cyclobenzaprine 5 milliGRAM(s) Oral every 12 hours PRN Muscle Spasm  dextrose Oral Gel 15 Gram(s) Oral once PRN Blood Glucose LESS THAN 70 milliGRAM(s)/deciliter  guaiFENesin Oral Liquid (Sugar-Free) 100 milliGRAM(s) Oral every 6 hours PRN Cough            REVIEW OF SYSTEMS:  Constitutional: [ ] fever, [ ]weight loss,  [ ]fatigue [ ]weight gain  Eyes: [ ] visual changes  Respiratory: [ ]shortness of breath;  [ ] cough, [ ]wheezing, [ ]chills, [ ]hemoptysis  Cardiovascular: [ ] chest pain, [ ]palpitations, [ ]dizziness,  [ ]leg swelling[ ]orthopnea[ ]PND  Gastrointestinal: [ ] abdominal pain, [ ]nausea, [ ]vomiting,  [ ]diarrhea [ ]Constipation [ ]Melena  Genitourinary: [ ] dysuria, [ ] hematuria [ ]Zaragoza  Neurologic: [ ] headaches [ ] tremors[ ]weakness [ ]Paralysis Right[ ] Left[ ]  Skin: [ ] itching, [ ]burning, [ ] rashes  Endocrine: [ ] heat or cold intolerance  Musculoskeletal: [ ] joint pain or swelling; [ ] muscle, back, or extremity pain  Psychiatric: [ ] depression, [ ]anxiety, [ ]mood swings, or [ ]difficulty sleeping  Hematologic: [ ] easy bruising, [ ] bleeding gums    [ ] All remaining systems negative except as per above.   [ ]Unable to obtain.  [x] No change in ROS since admission      Vital Signs Last 24 Hrs  T(C): 36.9 (27 Mar 2023 20:15), Max: 36.9 (27 Mar 2023 20:15)  T(F): 98.4 (27 Mar 2023 20:15), Max: 98.4 (27 Mar 2023 20:15)  HR: 63 (27 Mar 2023 20:15) (63 - 85)  BP: 124/58 (27 Mar 2023 20:15) (124/58 - 126/51)  BP(mean): --  RR: 18 (27 Mar 2023 20:15) (18 - 18)  SpO2: 100% (27 Mar 2023 20:15) (93% - 100%)    Parameters below as of 27 Mar 2023 20:15  Patient On (Oxygen Delivery Method): room air      I&O's Summary      PHYSICAL EXAM:  General: No acute distress BMI-  HEENT: EOMI, PERRL  Neck: Supple, [ ] JVD  Lungs: Equal air entry bilaterally; [ ] rales [ ] wheezing [ ] rhonchi  Heart: Regular rate and rhythm; [x ] murmur   2/6 [ x] systolic [ ] diastolic [ ] radiation[ ] rubs [ ]  gallops  Abdomen: Nontender, bowel sounds present  Extremities: No clubbing, cyanosis, [ ] edema [ ]Pulses  equal and intact  Nervous system:  Alert & Oriented X3, no focal deficits  Psychiatric: Normal affect  Skin: No rashes or lesions    LABS:  03-27    140  |  101  |  40<H>  ----------------------------<  138<H>  4.5   |  28  |  1.75<H>    Ca    10.3      27 Mar 2023 04:05  Phos  3.7     03-27  Mg     2.20     03-27    TPro  6.3  /  Alb  3.7  /  TBili  0.4  /  DBili  x   /  AST  14  /  ALT  15  /  AlkPhos  47  03-27    Creatinine Trend: 1.75<--, 1.48<--, 1.42<--, 1.35<--, 1.43<--, 1.62<--                        8.7    6.74  )-----------( 370      ( 27 Mar 2023 04:05 )             29.9

## 2023-03-27 NOTE — PROGRESS NOTE ADULT - SUBJECTIVE AND OBJECTIVE BOX
PULMONARY PROGRESS NOTE    TORI WOOTEN  MRN-[id number]    Patient is a 77y old  Female who presents with a chief complaint of sob (27 Mar 2023 12:49)      HPI:  -  -    ROS:   -    ACTIVE MEDICATION LIST:  MEDICATIONS  (STANDING):  apixaban 10 milliGRAM(s) Oral every 12 hours  buMETAnide 1 milliGRAM(s) Oral daily  chlorhexidine 2% Cloths 1 Application(s) Topical daily  dextrose 5%. 1000 milliLiter(s) (100 mL/Hr) IV Continuous <Continuous>  dextrose 5%. 1000 milliLiter(s) (50 mL/Hr) IV Continuous <Continuous>  dextrose 50% Injectable 25 Gram(s) IV Push once  dextrose 50% Injectable 12.5 Gram(s) IV Push once  dextrose 50% Injectable 25 Gram(s) IV Push once  famotidine    Tablet 10 milliGRAM(s) Oral daily  folic acid 1 milliGRAM(s) Oral daily  glucagon  Injectable 1 milliGRAM(s) IntraMuscular once  insulin lispro (ADMELOG) corrective regimen sliding scale   SubCutaneous three times a day before meals  insulin lispro (ADMELOG) corrective regimen sliding scale   SubCutaneous at bedtime  lansoprazole Capsule 30 milliGRAM(s) Oral daily  spironolactone 25 milliGRAM(s) Oral daily    MEDICATIONS  (PRN):  acetaminophen     Tablet .. 650 milliGRAM(s) Oral every 6 hours PRN Temp greater or equal to 38C (100.4F), Mild Pain (1 - 3)  acetaminophen   IVPB .. 1000 milliGRAM(s) IV Intermittent once PRN Severe Pain (7 - 10)  cyclobenzaprine 5 milliGRAM(s) Oral every 12 hours PRN Muscle Spasm  dextrose Oral Gel 15 Gram(s) Oral once PRN Blood Glucose LESS THAN 70 milliGRAM(s)/deciliter  guaiFENesin Oral Liquid (Sugar-Free) 100 milliGRAM(s) Oral every 6 hours PRN Cough      EXAM:  Vital Signs Last 24 Hrs  T(C): 36.3 (27 Mar 2023 04:36), Max: 36.7 (26 Mar 2023 19:59)  T(F): 97.4 (27 Mar 2023 04:36), Max: 98 (26 Mar 2023 19:59)  HR: 80 (27 Mar 2023 07:34) (78 - 100)  BP: 126/51 (27 Mar 2023 04:36) (126/51 - 137/65)  BP(mean): --  RR: 18 (27 Mar 2023 04:36) (18 - 18)  SpO2: 93% (27 Mar 2023 07:34) (93% - 100%)    Parameters below as of 27 Mar 2023 04:36  Patient On (Oxygen Delivery Method): room air        GENERAL: The patient is awake and alert in no apparent distress.     LUNGS: Clear to auscultation without wheezing, rales or rhonchi; respirations unlabored    HEART: Regular rate and rhythm without murmur.                            8.7    6.74  )-----------( 370      ( 27 Mar 2023 04:05 )             29.9       03-27    140  |  101  |  40<H>  ----------------------------<  138<H>  4.5   |  28  |  1.75<H>    Ca    10.3      27 Mar 2023 04:05  Phos  3.7     03-27  Mg     2.20     03-27    TPro  6.3  /  Alb  3.7  /  TBili  0.4  /  DBili  x   /  AST  14  /  ALT  15  /  AlkPhos  47  03-27    >>> <<<    PROBLEM LIST:  77y Female with HEALTH ISSUES - PROBLEM Dx:  Acute pulmonary embolism    Sleep apnea    Diabetes mellitus    Diabetes mellitus    Hypertension    Hyperlipidemia    Chronic atrial fibrillation    PAF (paroxysmal atrial fibrillation)    JACKLYN (acute kidney injury)    Suspected deep vein thrombosis (DVT)    Pulmonary thromboembolism              RECS:        Please call with any questions.    Barb Collazo DO  [facility] Pulmonary/Sleep Medicine  [phone]   PULMONARY PROGRESS NOTE    TORI WOOTEN  MRN-[id number]    Patient is a 77y old  Female who presents with a chief complaint of sob (27 Mar 2023 12:49)      HPI:  -seen on NC  not liking cpap machine  not getting her trelegy  less winded than admission but not yet back tobaseline   family at bedside  -    ROS:   -    ACTIVE MEDICATION LIST:  MEDICATIONS  (STANDING):  apixaban 10 milliGRAM(s) Oral every 12 hours  buMETAnide 1 milliGRAM(s) Oral daily  chlorhexidine 2% Cloths 1 Application(s) Topical daily  dextrose 5%. 1000 milliLiter(s) (100 mL/Hr) IV Continuous <Continuous>  dextrose 5%. 1000 milliLiter(s) (50 mL/Hr) IV Continuous <Continuous>  dextrose 50% Injectable 25 Gram(s) IV Push once  dextrose 50% Injectable 12.5 Gram(s) IV Push once  dextrose 50% Injectable 25 Gram(s) IV Push once  famotidine    Tablet 10 milliGRAM(s) Oral daily  folic acid 1 milliGRAM(s) Oral daily  glucagon  Injectable 1 milliGRAM(s) IntraMuscular once  insulin lispro (ADMELOG) corrective regimen sliding scale   SubCutaneous three times a day before meals  insulin lispro (ADMELOG) corrective regimen sliding scale   SubCutaneous at bedtime  lansoprazole Capsule 30 milliGRAM(s) Oral daily  spironolactone 25 milliGRAM(s) Oral daily    MEDICATIONS  (PRN):  acetaminophen     Tablet .. 650 milliGRAM(s) Oral every 6 hours PRN Temp greater or equal to 38C (100.4F), Mild Pain (1 - 3)  acetaminophen   IVPB .. 1000 milliGRAM(s) IV Intermittent once PRN Severe Pain (7 - 10)  cyclobenzaprine 5 milliGRAM(s) Oral every 12 hours PRN Muscle Spasm  dextrose Oral Gel 15 Gram(s) Oral once PRN Blood Glucose LESS THAN 70 milliGRAM(s)/deciliter  guaiFENesin Oral Liquid (Sugar-Free) 100 milliGRAM(s) Oral every 6 hours PRN Cough      EXAM:  Vital Signs Last 24 Hrs  T(C): 36.3 (27 Mar 2023 04:36), Max: 36.7 (26 Mar 2023 19:59)  T(F): 97.4 (27 Mar 2023 04:36), Max: 98 (26 Mar 2023 19:59)  HR: 80 (27 Mar 2023 07:34) (78 - 100)  BP: 126/51 (27 Mar 2023 04:36) (126/51 - 137/65)  BP(mean): --  RR: 18 (27 Mar 2023 04:36) (18 - 18)  SpO2: 93% (27 Mar 2023 07:34) (93% - 100%)    Parameters below as of 27 Mar 2023 04:36  Patient On (Oxygen Delivery Method): room air        GENERAL: The patient is awake and alert in no apparent distress.     LUNGS:   respirations unlabored                           8.7    6.74  )-----------( 370      ( 27 Mar 2023 04:05 )             29.9       03-27    140  |  101  |  40<H>  ----------------------------<  138<H>  4.5   |  28  |  1.75<H>    Ca    10.3      27 Mar 2023 04:05  Phos  3.7     03-27  Mg     2.20     03-27    TPro  6.3  /  Alb  3.7  /  TBili  0.4  /  DBili  x   /  AST  14  /  ALT  15  /  AlkPhos  47  03-27     < from: CT Angio Chest PE Protocol w/ IV Cont (03.18.23 @ 01:12) >    ACC: [id number] [facility]   ORDERED BY: YAYO MCLEAN     PROCEDURE DATE:  03/18/2023          INTERPRETATION:  CLINICAL INFORMATION: Chest pain. Evaluate for pulmonary   embolism.    COMPARISON: CTA chest 8/8/2020    CONTRAST/COMPLICATIONS:  IV Contrast: Omnipaque 350  70 cc administered   30 cc discarded  Oral Contrast: NONE  Complications: None reported at time of study completion    PROCEDURE:  CT Angiography of the Chest.  Sagittal and coronal reformats were performed as well as 3D (MIP)   reconstructions.    FINDINGS:    LUNGS AND AIRWAYS: Patent central airways.  Left lower lobe calcified   granuloma. A 4 mm nodule in the left lower lobe. Bibasilar subsegmental   atelectasis.  PLEURA: No pleural effusion.  MEDIASTINUM AND JAVIER: No lymphadenopathy and several calcified   mediastinal and hilar lymph nodes.  VESSELS: Acute pulmonary emboli in the right interlobar artery and right   upper, middle, and lower lobar branches. Acute pulmonary emboli within   the left upper and lower lobar branches.  HEART: Increased size of the right ventricle as compared to the left   ventricle, with leftward convexity of the interventricular septum. No   pericardial effusion.  CHEST WALL AND LOWER NECK: Redemonstrated nodular thyroid.  VISUALIZED UPPER ABDOMEN: Small hiatal hernia. Scattered splenic   granulomata. Left renal cyst.  BONES: Chronic fracture deformities of the left fourth through sixth   ribs. Degenerative changes.    IMPRESSION:  Acute bilateral lobar branch pulmonary emboli as described above, with   suggestion of right heart strain.    MD Martha was informed of these results by Melinda Pinon MD with   read back at about 2:18 AM on 3/18/2023    --- End of Report ---          MELINDA PINON MD; Resident Radiologist  This document has been electronically signed.  ALYSON LEE MD; Attending Radiologist  This document has been electronically signed. Mar 18 2023  3:44AM    < end of copied text >  < from: Transthoracic Echocardiogram (03.20.23 @ 09:01) >    Patient name: TORI WOOTEN  YOB: 1945   Age: 77 (F)   MR#: [id number]  Study Date: 3/20/2023  [facility]  Study quality: Technically Difficult  Referring Physician: Ronan Gonzalez MD  BloodPressure: 126/76 mmHg  Height: 155 cm  Weight: 123 kg  BSA: 2.2 m2  ------------------------------------------------------------------------  PROCEDURE: Transthoracic echocardiogram with 2-D, M-Mode  and complete spectral and color flow Doppler.  INDICATION: Other pulmonary embolism without acute cor  pulmonale (I26.99)  ------------------------------------------------------------------------  DIMENSIONS:  Dimensions:     Normal Values:  LA:     3.9 cm    2.0 - 4.0 cm  Ao:     2.6 cm    2.0 - 3.8cm  SEPTUM: 0.9 cm    0.6 - 1.2 cm  PWT:    0.9 cm    0.6 - 1.1 cm  LVIDd:  3.8 cm    3.0 - 5.6 cm  LVIDs:    ---     1.8 - 4.0 cm  Derived Variables:  LVMI: 47 g/m2  RWT: 0.47  Ejection Fraction (Modified Dumont Rule): 70 %  ------------------------------------------------------------------------  OBSERVATIONS:  Mitral Valve: Mitral annular calcification, otherwise  normal mitral valve.  Aortic Root: Normal aortic root.  Aortic Valve: Calcified aortic valve with normal opening.  Mild aortic regurgitation.  Left Atrium: Normal left atrium.  LA volume index = 21  cc/m2.  Left Ventricle: Normal left ventricular systolic function.  No segmental wall motion abnormalities. Increased relative  wall thickness with normal left ventricular mass index,  consistent with concentric left ventricular remodeling.  Reversal of the E-A  waves of the mitral inflow pattern is  consistent with diastolic LV dysfunction.  Right Heart: Normal right atrium. Normal right ventricular  size and function. Normal tricuspid valve. Mild tricuspid  regurgitation. Normal pulmonic valve.  Pericardium/PleuraNormal pericardium with no pericardial  effusion.  Hemodynamic: Estimated right ventricular systolic pressure  equals 26 mm Hg, assuming right atrial pressure equals 10  mm Hg, consistent with normal pulmonary pressures.  ------------------------------------------------------------------------  CONCLUSIONS:  1. Calcified aortic valve with normal opening. Mild aortic  regurgitation.  2. Normal left ventricular internal dimensions and wall  thicknesses.  3. Normal left ventricular systolic function. No segmental  wall motion abnormalities.  4. Normal right ventricular size and function.  ------------------------------------------------------------------------  Confirmed on  3/20/2023 - 11:33:22 by Bennie Fritz M.D. RPVI  ------------------------------------------------------------------------    < end of copied text >      PROBLEM LIST:  77y Female with HEALTH ISSUES - PROBLEM Dx:  Acute pulmonary embolism    Sleep apnea    Diabetes mellitus    Diabetes mellitus    Hypertension    Hyperlipidemia    Chronic atrial fibrillation    PAF (paroxysmal atrial fibrillation)    JACKLYN (acute kidney injury)    Suspected deep vein thrombosis (DVT)    Pulmonary thromboembolism              RECS:  symbicort/spiriva hwile inp atient; can go back on trelegy qd when discharged   full AC  cpap with sleep  wean O2  incentive divya  oob/chair  close f/u with DR Valenzuela    Please call with any questions.    Barb Collazo, DO  [facility] Pulmonary/Sleep Medicine  [phone]

## 2023-03-27 NOTE — PROGRESS NOTE ADULT - PROBLEM SELECTOR PLAN 2
iss  pt on oral hypoglycemic agents at home  start iss  diabetic diet/

## 2023-03-27 NOTE — PROGRESS NOTE ADULT - PROBLEM SELECTOR PLAN 4
hold olmesartan , aldactone as per cards sec to elevated cr  cont lasix as per cards

## 2023-03-27 NOTE — PROGRESS NOTE ADULT - PROBLEM SELECTOR PLAN 1
sec to evidence of rv strain on CT , CCU was consulted - no ccu care at present   -Eliquis  Acute Deep venous thrombosis in the BILATERAL popliteal and peroneal   veins.    vascular consult f/u  ? triggered by PAF   risk and benef< from: Transthoracic Echocardiogram (03.20.23 @ 09:01) >    Calcified aortic valve with normal opening. Mild aortic  regurgitation.  2. Normal left ventricular internal dimensions and wall  thicknesses.  3. Normal left ventricular systolic function. No segmental  wall motion abnormalities.  4. Normal right ventricular size and function.    -f/u venous doppler-upper exts-neg
sec to evidence of rv strain on CT , CCU was consulted - no ccu care at present   -Eliquis  Acute Deep venous thrombosis in the BILATERAL popliteal and peroneal   veins.    vascular consult f/u  ? triggered by PAF   risk and benef< from: Transthoracic Echocardiogram (03.20.23 @ 09:01) >    Calcified aortic valve with normal opening. Mild aortic  regurgitation.  2. Normal left ventricular internal dimensions and wall  thicknesses.  3. Normal left ventricular systolic function. No segmental  wall motion abnormalities.  4. Normal right ventricular size and function.    -check dopler rt.arm-r/o dvt
sec to evidence of rv strain on CT , CCU was consulted - no ccu care at present   -Eliquis  Acute Deep venous thrombosis in the BILATERAL popliteal and peroneal   veins.    vascular consult f/u  ? triggered by PAF   risk and benef< from: Transthoracic Echocardiogram (03.20.23 @ 09:01) >    Calcified aortic valve with normal opening. Mild aortic  regurgitation.  2. Normal left ventricular internal dimensions and wall  thicknesses.  3. Normal left ventricular systolic function. No segmental  wall motion abnormalities.  4. Normal right ventricular size and function.    -f/u venous doppler-upper exts-neg
sec to evidence of rv strain on CT , CCU was consulted - no ccu care at present   -Eliquis  Acute Deep venous thrombosis in the BILATERAL popliteal and peroneal   veins.    vascular consult f/u  ? triggered by PAF   risk and benef< from: Transthoracic Echocardiogram (03.20.23 @ 09:01) >    Calcified aortic valve with normal opening. Mild aortic  regurgitation.  2. Normal left ventricular internal dimensions and wall  thicknesses.  3. Normal left ventricular systolic function. No segmental  wall motion abnormalities.  4. Normal right ventricular size and function.    -f/u venous doppler-upper exts-neg
sec to evidence of rv strain on CT , CCU was consulted - no ccu care at present   -Eliquis  Acute Deep venous thrombosis in the BILATERAL popliteal and peroneal   veins.    vascular consult f/u  ? triggered by PAF   risk and benef< from: Transthoracic Echocardiogram (03.20.23 @ 09:01) >    Calcified aortic valve with normal opening. Mild aortic  regurgitation.  2. Normal left ventricular internal dimensions and wall  thicknesses.  3. Normal left ventricular systolic function. No segmental  wall motion abnormalities.  4. Normal right ventricular size and function.    -check dopler rt.arm-r/o dvt
sec to evidence of rv strain on CT , CCU was consulted - no ccu care at present   -Eliquis  Acute Deep venous thrombosis in the BILATERAL popliteal and peroneal   veins.    vascular consult f/u  ? triggered by PAF   risk and benef< from: Transthoracic Echocardiogram (03.20.23 @ 09:01) >    Calcified aortic valve with normal opening. Mild aortic  regurgitation.  2. Normal left ventricular internal dimensions and wall  thicknesses.  3. Normal left ventricular systolic function. No segmental  wall motion abnormalities.  4. Normal right ventricular size and function.    -f/u venous doppler-upper exts-neg
sec to evidence of rv strain on CT , CCU was consulted - no ccu care at present   cont heparin   Acute Deep venous thrombosis in the BILATERAL popliteal and peroneal   veins.    < end of copied text >  echo   vascular consult f/u  ? triggered by PAF   risk and benefits of ac informed
sec to evidence of rv strain on CT , CCU was consulted - no ccu care at present   cont heparin   Acute Deep venous thrombosis in the BILATERAL popliteal and peroneal   veins.    < end of copied text >  echo   vascular consult f/u  ? triggered by PAF   risk and benefits of ac informed
sec to evidence of rv strain on CT , CCU was consulted - no ccu care at present   cont heparin   Acute Deep venous thrombosis in the BILATERAL popliteal and peroneal   veins.    vascular consult f/u  ? triggered by PAF   risk and benef< from: Transthoracic Echocardiogram (03.20.23 @ 09:01) >    Calcified aortic valve with normal opening. Mild aortic  regurgitation.  2. Normal left ventricular internal dimensions and wall  thicknesses.  3. Normal left ventricular systolic function. No segmental  wall motion abnormalities.  4. Normal right ventricular size and function.    < end of copied text >    its of ac informed
sec to evidence of rv strain on CT , CCU was consulted - no ccu care at present   -Eliquis  Acute Deep venous thrombosis in the BILATERAL popliteal and peroneal   veins.    vascular consult f/u  ? triggered by PAF   risk and benef< from: Transthoracic Echocardiogram (03.20.23 @ 09:01) >    Calcified aortic valve with normal opening. Mild aortic  regurgitation.  2. Normal left ventricular internal dimensions and wall  thicknesses.  3. Normal left ventricular systolic function. No segmental  wall motion abnormalities.  4. Normal right ventricular size and function.    -f/u venous doppler-upper exts-neg
sec to evidence of rv strain on CT , CCU was consulted - no ccu care at present   -Eliquis  Acute Deep venous thrombosis in the BILATERAL popliteal and peroneal   veins.    vascular consult f/u  ? triggered by PAF   risk and benef< from: Transthoracic Echocardiogram (03.20.23 @ 09:01) >    Calcified aortic valve with normal opening. Mild aortic  regurgitation.  2. Normal left ventricular internal dimensions and wall  thicknesses.  3. Normal left ventricular systolic function. No segmental  wall motion abnormalities.  4. Normal right ventricular size and function.    -f/u venous doppler-upper exts
sec to evidence of rv strain on CT , CCU was consulted - no ccu care at present   -Eliquis  Acute Deep venous thrombosis in the BILATERAL popliteal and peroneal   veins.    vascular consult f/u  ? triggered by PAF   risk and benef< from: Transthoracic Echocardiogram (03.20.23 @ 09:01) >    Calcified aortic valve with normal opening. Mild aortic  regurgitation.  2. Normal left ventricular internal dimensions and wall  thicknesses.  3. Normal left ventricular systolic function. No segmental  wall motion abnormalities.  4. Normal right ventricular size and function.    -f/u venous doppler-upper exts-neg
sec to evidence of rv strain on CT , CCU was consulted - no ccu care at present   cont heparin   Acute Deep venous thrombosis in the BILATERAL popliteal and peroneal   veins.    < end of copied text >  echo   vascular consult  ? triggered by PAF   risk and benefits of ac informed

## 2023-03-27 NOTE — PROGRESS NOTE ADULT - PROBLEM SELECTOR PROBLEM 4
JACKLYN (acute kidney injury)

## 2023-03-27 NOTE — PROGRESS NOTE ADULT - PROBLEM SELECTOR PLAN 3
pulm f/u   on suppl oxygen at present   cpap at night

## 2023-03-27 NOTE — PROGRESS NOTE ADULT - ASSESSMENT
77y Female with history of long standing HTN and DM presents with SOB. Nephrology consulted for elevated Scr.    1) CKD-3b: Baseline Scr 1.4-1.6 as per PMD with CKD-3b in setting of long standing HTN and DM. Scr mildly increased this morning in the setting of improving volume status but remains near baseline. UA bland without proteinuria. Renal US with bilateral simple cysts. Outpatient CKD work up. Avoid nephrotoxins. Monitor electrolytes.    2) HTN with CKD: BP controlled. Holding home olmesartan but will resume if BP increases. Monitor BP.    3) LE edema: Improving. Continue with bumex 1 mg PO daily and aldactone 25 mg daily. TTE with normal LVSF. Monitor UO.    4) PE: As per primary team.        [facility] NEPHROLOGY  Eric Acosta M.D.  Mitch Tatum D.O.  Paty Lama M.D.  Yanci Mirza, CHARLI, ANP-C    Telephone: [phone]  Facsimile: [phone]    [address] [place] [address]

## 2023-03-27 NOTE — PROGRESS NOTE ADULT - NUTRITIONAL ASSESSMENT
This patient has been assessed with a concern for Malnutrition and has been determined to have a diagnosis/diagnoses of Morbid obesity (BMI > 40).    This patient is being managed with:   Diet DASH/TLC-  Sodium & Cholesterol Restricted  Consistent Carbohydrate {No Snacks} (CSTCHO)  Entered: Mar 18 2023  1:23PM    

## 2023-03-27 NOTE — PROGRESS NOTE ADULT - PROBLEM/PLAN-2
DISPLAY PLAN FREE TEXT
(V5) oriented
DISPLAY PLAN FREE TEXT

## 2023-03-27 NOTE — PROGRESS NOTE ADULT - SUBJECTIVE AND OBJECTIVE BOX
TORI WOOTEN  77y  Female      Patient is a 77y old  Female who presents with a chief complaint of sob (26 Mar 2023 11:01)  c/o pain in rt.arm near elbow area.no recent fall.no sob,no cp,no fever,no cough    REVIEW OF SYSTEMS:  CONSTITUTIONAL: No fever  RESPIRATORY: No cough, hemoptysis or shortness of breath  CARDIOVASCULAR: No chest pain, palpitations, dizziness, or leg swelling  GASTROINTESTINAL: No abdominal pain. nausea, vomiting, hematemesis  GENITOURINARY: No dysuria, frequency, hematuria   NEUROLOGICAL: No headaches, no dizziness  MUSCULOSKELETAL: No joint pain or swelling;       MEDICATIONS  (STANDING):  apixaban 10 milliGRAM(s) Oral every 12 hours  budesonide 160 MICROgram(s)/formoterol 4.5 MICROgram(s) Inhaler 2 Puff(s) Inhalation two times a day  buMETAnide 1 milliGRAM(s) Oral daily  chlorhexidine 2% Cloths 1 Application(s) Topical daily  dextrose 5%. 1000 milliLiter(s) (50 mL/Hr) IV Continuous <Continuous>  dextrose 5%. 1000 milliLiter(s) (100 mL/Hr) IV Continuous <Continuous>  dextrose 50% Injectable 25 Gram(s) IV Push once  dextrose 50% Injectable 12.5 Gram(s) IV Push once  dextrose 50% Injectable 25 Gram(s) IV Push once  famotidine    Tablet 10 milliGRAM(s) Oral daily  folic acid 1 milliGRAM(s) Oral daily  glucagon  Injectable 1 milliGRAM(s) IntraMuscular once  insulin lispro (ADMELOG) corrective regimen sliding scale   SubCutaneous three times a day before meals  insulin lispro (ADMELOG) corrective regimen sliding scale   SubCutaneous at bedtime  lansoprazole Capsule 30 milliGRAM(s) Oral daily  spironolactone 25 milliGRAM(s) Oral daily  tiotropium 2.5 MICROgram(s) Inhaler 2 Puff(s) Inhalation daily    MEDICATIONS  (PRN):  acetaminophen     Tablet .. 650 milliGRAM(s) Oral every 6 hours PRN Temp greater or equal to 38C (100.4F), Mild Pain (1 - 3)  acetaminophen   IVPB .. 1000 milliGRAM(s) IV Intermittent once PRN Severe Pain (7 - 10)  cyclobenzaprine 5 milliGRAM(s) Oral every 12 hours PRN Muscle Spasm  dextrose Oral Gel 15 Gram(s) Oral once PRN Blood Glucose LESS THAN 70 milliGRAM(s)/deciliter  guaiFENesin Oral Liquid (Sugar-Free) 100 milliGRAM(s) Oral every 6 hours PRN Cough    Vital Signs Last 24 Hrs  T(C): 36.9 (23 @ 20:15), Max: 36.9 (23 @ 20:15)  T(F): 98.4 (23 @ 20:15), Max: 98.4 (23 @ 20:15)  HR: 81 (23 @ 22:59) (63 - 85)  BP: 124/58 (23 @ 20:15) (124/58 - 126/51)  BP(mean): --  RR: 18 (23 @ 20:15) (18 - 18)  SpO2: 94% (23 @ 22:59) (93% - 100%)              PAST MEDICAL & SURGICAL HISTORY:  HTN (hypertension)      Sleep apnea      Diabetes mellitus      Chronic GERD      HLD (hyperlipidemia)      S/P knee replacement  Right (  )      History of endometrial ablation  2002      S/P  section  1977      History of unilateral oophorectomy              RADIOLOGY & ADDITIONAL TESTS:    Imaging Personally Reviewed:  [ ] YES  [ ] NO    Consultant(s) Notes Reviewed:  [ ] YES  [ ] NO    PHYSICAL EXAM:  GENERAL: Alert and awake lying in bed in no distress  HEAD:  Atraumatic, Normocephalic  EYES: EOMI, DIO, conjunctiva and sclera clear  NECK: Supple, No JVD, Normal thyroid  NERVOUS SYSTEM:  Alert & Oriented X3, Motor and sensory systems are intact,   CHEST/LUNG: dec breath sounds at bases  HEART: Regular rate and rhythm; No murmurs, rubs, or gallops  ABDOMEN: Soft, Nontender, Nondistended; Bowel sounds present  EXTREMITIES:   Peripheral Pulses are palpable, no  edema.diffuse exts on b/l upper arms.no tenderness on rt.arm        Care Discussed with Consultants/Other Providers [ cx YES  [ ] NO    LABS:      140  |  101  |  40<H>  ----------------------------<  138<H>  4.5   |  28  |  1.75<H>    Ca    10.3      27 Mar 2023 04:05  Phos  3.7       Mg     2.20         TPro  6.3  /  Alb  3.7  /  TBili  0.4  /  DBili      /  AST  14  /  ALT  15  /  AlkPhos  47      Creatinine Trend: 1.75 <--, 1.48 <--, 1.42 <--, 1.35 <--, 1.43 <--, 1.62 <--                        8.7    6.74  )-----------( 370      ( 27 Mar 2023 04:05 )             29.9     Urine Studies:  Urinalysis Basic - ( 21 Mar 2023 22:05 )    Color: Light Yellow / Appearance: Clear / S.019 / pH:   Gluc:  / Ketone: Negative  / Bili: Negative / Urobili: <2 mg/dL   Blood:  / Protein: Trace / Nitrite: Negative   Leuk Esterase: Negative / RBC: 1 /HPF / WBC 1 /HPF   Sq Epi:  / Non Sq Epi:  / Bacteria:       Protein/Creatinine Ratio Calculation: 0.1 Ratio ( @ 22:05)  Creatinine, Random Urine: 157 mg/dL ( @ 22:05)          LIVER FUNCTIONS - ( 27 Mar 2023 04:05 )  Alb: 3.7 g/dL / Pro: 6.3 g/dL / ALK PHOS: 47 U/L / ALT: 15 U/L / AST: 14 U/L / GGT: x

## 2023-03-27 NOTE — PROGRESS NOTE ADULT - TIME BILLING
- Review of records, telemetry, vital signs and daily labs.   - General and cardiovascular physical examination.  - Generation of cardiovascular treatment plan.  - Coordination of care.      Patient was seen and examined by me on 3/26/23,interim events noted,labs and radiology studies reviewed.  Zach Winn MD,FACC.  [address] [place], [place] [id number].  [phone]
d/w ACP
- Review of records, telemetry, vital signs and daily labs.   - General and cardiovascular physical examination.  - Generation of cardiovascular treatment plan.  - Coordination of care.      Patient was seen and examined by me on 3/24/23,interim events noted,labs and radiology studies reviewed.  Zach Winn MD,FACC.  [address] [place], [place] [id number].  [phone]
colostrum
d/w ACP
- Review of records, telemetry, vital signs and daily labs.   - General and cardiovascular physical examination.  - Generation of cardiovascular treatment plan.  - Coordination of care.      Patient was seen and examined by me on 3/27/23,interim events noted,labs and radiology studies reviewed.  Zach Winn MD,FACC.  [address] [place], [place] [id number].  [phone]
- Review of records, telemetry, vital signs and daily labs.   - General and cardiovascular physical examination.  - Generation of cardiovascular treatment plan.  - Coordination of care.      Patient was seen and examined by me on 3/25/23,interim events noted,labs and radiology studies reviewed.  Zach Winn MD,FACC.  [address] [place], [place] [id number].  [phone]
d/w ACP

## 2023-03-27 NOTE — PROGRESS NOTE ADULT - SUBJECTIVE AND OBJECTIVE BOX
[facility] NEPHROLOGY- PROGRESS NOTE    77y Female with history of long standing HTN and DM presents with SOB. Nephrology consulted for elevated Scr.    REVIEW OF SYSTEMS:  Gen: no fevers  Cards: no chest pain  Resp: + dyspnea improving  GI: no nausea or vomiting or diarrhea  Vascular: + LE edema improving    codeine (Other)  penicillin (Blisters)      Hospital Medications: Medications reviewed      VITALS:  T(F): 97.4 (23 @ 04:36), Max: 98 (23 @ 19:59)  HR: 80 (23 @ 07:34)  BP: 126/51 (23 @ 04:36)  RR: 18 (23 @ 04:36)  SpO2: 93% (23 @ 07:34)  Wt(kg): --        PHYSICAL EXAM:  Gen: NAD, calm  Cards: RRR, +S1/S2, no M/G/R  Resp: CTA B/L  GI: soft, NT/ND, NABS  Vascular: 1+ LE edema B/L      LABS:      140  |  101  |  40<H>  ----------------------------<  138<H>  4.5   |  28  |  1.75<H>    Ca    10.3      27 Mar 2023 04:05  Phos  3.7       Mg     2.20         TPro  6.3  /  Alb  3.7  /  TBili  0.4  /  DBili      /  AST  14  /  ALT  15  /  AlkPhos  47      Creatinine Trend: 1.75 <--, 1.48 <--, 1.42 <--, 1.35 <--, 1.43 <--, 1.62 <--                        8.7    6.74  )-----------( 370      ( 27 Mar 2023 04:05 )             29.9     Urine Studies:  Urinalysis Basic - ( 21 Mar 2023 22:05 )    Color: Light Yellow / Appearance: Clear / S.019 / pH:   Gluc:  / Ketone: Negative  / Bili: Negative / Urobili: <2 mg/dL   Blood:  / Protein: Trace / Nitrite: Negative   Leuk Esterase: Negative / RBC: 1 /HPF / WBC 1 /HPF   Sq Epi:  / Non Sq Epi:  / Bacteria:       Creatinine, Random Urine: 157 mg/dL ( @ 22:05)  Protein/Creatinine Ratio Calculation: 0.1 Ratio ( @ 22:05)

## 2023-03-27 NOTE — PROGRESS NOTE ADULT - PROBLEM SELECTOR PROBLEM 3
Sleep apnea

## 2023-03-28 ENCOUNTER — TRANSCRIPTION ENCOUNTER (OUTPATIENT)
Age: 78
End: 2023-03-28

## 2023-03-28 VITALS
OXYGEN SATURATION: 98 % | SYSTOLIC BLOOD PRESSURE: 131 MMHG | HEART RATE: 90 BPM | RESPIRATION RATE: 19 BRPM | DIASTOLIC BLOOD PRESSURE: 74 MMHG | TEMPERATURE: 97 F

## 2023-03-28 LAB
ANION GAP SERPL CALC-SCNC: 13 MMOL/L — SIGNIFICANT CHANGE UP (ref 7–14)
BUN SERPL-MCNC: 35 MG/DL — HIGH (ref 7–23)
CALCIUM SERPL-MCNC: 10.7 MG/DL — HIGH (ref 8.4–10.5)
CHLORIDE SERPL-SCNC: 100 MMOL/L — SIGNIFICANT CHANGE UP (ref 98–107)
CO2 SERPL-SCNC: 27 MMOL/L — SIGNIFICANT CHANGE UP (ref 22–31)
CREAT SERPL-MCNC: 1.57 MG/DL — HIGH (ref 0.5–1.3)
EGFR: 33 ML/MIN/1.73M2 — LOW
GLUCOSE BLDC GLUCOMTR-MCNC: 100 MG/DL — HIGH (ref 70–99)
GLUCOSE BLDC GLUCOMTR-MCNC: 127 MG/DL — HIGH (ref 70–99)
GLUCOSE SERPL-MCNC: 114 MG/DL — HIGH (ref 70–99)
HCT VFR BLD CALC: 33 % — LOW (ref 34.5–45)
HGB BLD-MCNC: 9.7 G/DL — LOW (ref 11.5–15.5)
MCHC RBC-ENTMCNC: 26.3 PG — LOW (ref 27–34)
MCHC RBC-ENTMCNC: 29.4 GM/DL — LOW (ref 32–36)
MCV RBC AUTO: 89.4 FL — SIGNIFICANT CHANGE UP (ref 80–100)
NRBC # BLD: 0 /100 WBCS — SIGNIFICANT CHANGE UP (ref 0–0)
NRBC # FLD: 0 K/UL — SIGNIFICANT CHANGE UP (ref 0–0)
PLATELET # BLD AUTO: 427 K/UL — HIGH (ref 150–400)
POTASSIUM SERPL-MCNC: 4.4 MMOL/L — SIGNIFICANT CHANGE UP (ref 3.5–5.3)
POTASSIUM SERPL-SCNC: 4.4 MMOL/L — SIGNIFICANT CHANGE UP (ref 3.5–5.3)
RBC # BLD: 3.69 M/UL — LOW (ref 3.8–5.2)
RBC # FLD: 16 % — HIGH (ref 10.3–14.5)
SODIUM SERPL-SCNC: 140 MMOL/L — SIGNIFICANT CHANGE UP (ref 135–145)
WBC # BLD: 7.72 K/UL — SIGNIFICANT CHANGE UP (ref 3.8–10.5)
WBC # FLD AUTO: 7.72 K/UL — SIGNIFICANT CHANGE UP (ref 3.8–10.5)

## 2023-03-28 RX ORDER — DILTIAZEM HCL 120 MG
1 CAPSULE, EXT RELEASE 24 HR ORAL
Qty: 0 | Refills: 0 | DISCHARGE

## 2023-03-28 RX ORDER — OLMESARTAN MEDOXOMIL 5 MG/1
1 TABLET, FILM COATED ORAL
Qty: 0 | Refills: 0 | DISCHARGE

## 2023-03-28 RX ORDER — APIXABAN 2.5 MG/1
5 TABLET, FILM COATED ORAL
Refills: 0 | Status: DISCONTINUED | OUTPATIENT
Start: 2023-03-28 | End: 2023-03-28

## 2023-03-28 RX ORDER — SODIUM CHLORIDE 0.65 %
1 AEROSOL, SPRAY (ML) NASAL THREE TIMES A DAY
Refills: 0 | Status: DISCONTINUED | OUTPATIENT
Start: 2023-03-28 | End: 2023-03-28

## 2023-03-28 RX ORDER — BUMETANIDE 0.25 MG/ML
1 INJECTION INTRAMUSCULAR; INTRAVENOUS
Qty: 30 | Refills: 0
Start: 2023-03-28 | End: 2023-04-26

## 2023-03-28 RX ORDER — APIXABAN 2.5 MG/1
1 TABLET, FILM COATED ORAL
Qty: 60 | Refills: 0
Start: 2023-03-28 | End: 2023-04-26

## 2023-03-28 RX ORDER — ETODOLAC 400 MG/1
1 TABLET, FILM COATED ORAL
Qty: 0 | Refills: 0 | DISCHARGE

## 2023-03-28 RX ORDER — POTASSIUM CHLORIDE 20 MEQ
1 PACKET (EA) ORAL
Qty: 0 | Refills: 0 | DISCHARGE

## 2023-03-28 RX ADMIN — LANSOPRAZOLE 30 MILLIGRAM(S): 15 CAPSULE, DELAYED RELEASE ORAL at 11:16

## 2023-03-28 RX ADMIN — CHLORHEXIDINE GLUCONATE 1 APPLICATION(S): 213 SOLUTION TOPICAL at 11:16

## 2023-03-28 RX ADMIN — SPIRONOLACTONE 25 MILLIGRAM(S): 25 TABLET, FILM COATED ORAL at 05:00

## 2023-03-28 RX ADMIN — TIOTROPIUM BROMIDE 2 PUFF(S): 18 CAPSULE ORAL; RESPIRATORY (INHALATION) at 11:15

## 2023-03-28 RX ADMIN — FAMOTIDINE 10 MILLIGRAM(S): 10 INJECTION INTRAVENOUS at 11:16

## 2023-03-28 RX ADMIN — Medication 1 MILLIGRAM(S): at 11:16

## 2023-03-28 RX ADMIN — Medication 1 SPRAY(S): at 02:16

## 2023-03-28 RX ADMIN — Medication 650 MILLIGRAM(S): at 12:30

## 2023-03-28 RX ADMIN — BUMETANIDE 1 MILLIGRAM(S): 0.25 INJECTION INTRAMUSCULAR; INTRAVENOUS at 05:00

## 2023-03-28 RX ADMIN — BUDESONIDE AND FORMOTEROL FUMARATE DIHYDRATE 2 PUFF(S): 160; 4.5 AEROSOL RESPIRATORY (INHALATION) at 09:56

## 2023-03-28 RX ADMIN — APIXABAN 5 MILLIGRAM(S): 2.5 TABLET, FILM COATED ORAL at 05:00

## 2023-03-28 RX ADMIN — Medication 650 MILLIGRAM(S): at 11:45

## 2023-03-28 NOTE — PROGRESS NOTE ADULT - PROVIDER SPECIALTY LIST ADULT
Nephrology
Cardiology
Cardiology
Nephrology
Nephrology
Pulmonology
Cardiology
Nephrology
Pulmonology
Cardiology
Cardiology
Internal Medicine
Nephrology
Internal Medicine
Internal Medicine
Cardiology
Internal Medicine
Cardiology
Internal Medicine
Internal Medicine
Cardiology
Internal Medicine

## 2023-03-28 NOTE — PROVIDER CONTACT NOTE (OTHER) - SITUATION
Complaining of bilateral cramping/muscle spasms.
/51
Pt had a mild nose bleed. VSS. Pt complaining of dry nose. Pt refusing CPAP therapy.

## 2023-03-28 NOTE — DISCHARGE NOTE NURSING/CASE MANAGEMENT/SOCIAL WORK - NSDCPEFALRISK_GEN_ALL_CORE
For information on Fall & Injury Prevention, visit: [email or web address] OR  [email or web address] OR  https://www.cdc.gov/steadi/patient.html

## 2023-03-28 NOTE — PROVIDER CONTACT NOTE (OTHER) - REASON
Pt had a mild nose bleed. VSS. Pt complaining of dry nose. Pt refusing CPAP therapy.
BP
Complaining of bilateral cramping/muscle spasms

## 2023-03-28 NOTE — DISCHARGE NOTE NURSING/CASE MANAGEMENT/SOCIAL WORK - PATIENT PORTAL LINK FT
You can access the FollowMyHealth Patient Portal offered by [facility] by registering at the following website: [email or web address]. By joining [employer]’s FollowMyHealth portal, you will also be able to view your health information using other applications (apps) compatible with our system.

## 2023-03-28 NOTE — PROGRESS NOTE ADULT - SUBJECTIVE AND OBJECTIVE BOX
[facility] NEPHROLOGY- PROGRESS NOTE    77y Female with history of long standing HTN and DM presents with SOB. Nephrology consulted for elevated Scr.    REVIEW OF SYSTEMS:  Gen: no fevers  Cards: no chest pain  Resp: + dyspnea improving  GI: no nausea or vomiting or diarrhea  Vascular: + LE edema improving    codeine (Other)  penicillin (Blisters)      Hospital Medications: Medications reviewed      VITALS:  T(F): 97.2 (23 @ 11:45), Max: 98.4 (23 @ 20:15)  HR: 90 (23 @ 11:45)  BP: 131/74 (23 @ 11:45)  RR: 19 (23 @ 11:45)  SpO2: 98% (23 @ 11:45)  Wt(kg): --        PHYSICAL EXAM:  Gen: NAD, calm  Cards: RRR, +S1/S2, no M/G/R  Resp: CTA B/L  GI: soft, NT/ND, NABS  Vascular: trace LE edema B/L      LABS:      140  |  100  |  35<H>  ----------------------------<  114<H>  4.4   |  27  |  1.57<H>    Ca    10.7<H>      28 Mar 2023 06:30  Phos  3.7       Mg     2.20         TPro  6.3  /  Alb  3.7  /  TBili  0.4  /  DBili      /  AST  14  /  ALT  15  /  AlkPhos  47      Creatinine Trend: 1.57 <--, 1.75 <--, 1.48 <--, 1.42 <--, 1.35 <--, 1.43 <--                        9.7    7.72  )-----------( 427      ( 28 Mar 2023 06:30 )             33.0     Urine Studies:  Urinalysis Basic - ( 21 Mar 2023 22:05 )    Color: Light Yellow / Appearance: Clear / S.019 / pH:   Gluc:  / Ketone: Negative  / Bili: Negative / Urobili: <2 mg/dL   Blood:  / Protein: Trace / Nitrite: Negative   Leuk Esterase: Negative / RBC: 1 /HPF / WBC 1 /HPF   Sq Epi:  / Non Sq Epi:  / Bacteria:       Creatinine, Random Urine: 157 mg/dL ( @ 22:05)  Protein/Creatinine Ratio Calculation: 0.1 Ratio ( @ 22:05)

## 2023-03-28 NOTE — PROVIDER CONTACT NOTE (OTHER) - BACKGROUND
Admitted for PE & DVT.
Pt admitted for other pulmonary embolism without acute cor pulmonale
Pt admitted to emergency department with BL PE with unknown provocation confirmed by CT Chest.

## 2023-03-28 NOTE — PROGRESS NOTE ADULT - REASON FOR ADMISSION
sob

## 2023-03-28 NOTE — PROVIDER CONTACT NOTE (OTHER) - ASSESSMENT
Pt had a mild nose bleed. VSS. Pt complaining of dry nose. Pt refusing CPAP therapy.
/51. Pt AOX4. No s/s of distress noted.
Right arm extremely ecchymotic, hard, tender to touch.

## 2023-03-28 NOTE — PROVIDER CONTACT NOTE (OTHER) - ACTION/TREATMENT ORDERED:
Provider made aware. Nasal spray ordered. Provider made aware. Nasal spray ordered. Eliquis decreased to 5 mg PO.

## 2023-03-28 NOTE — PROGRESS NOTE ADULT - ASSESSMENT
77y Female with history of long standing HTN and DM presents with SOB. Nephrology consulted for elevated Scr.    1) CKD-3b: Baseline Scr 1.4-1.6 as per PMD with CKD-3b in setting of long standing HTN and DM. Scr @ baseline. UA bland without proteinuria. Renal US with bilateral simple cysts. Outpatient CKD work up. Avoid nephrotoxins. Monitor electrolytes.    2) HTN with CKD: BP controlled. Holding home olmesartan but will resume if BP increases. Monitor BP.    3) LE edema: Improving. Continue with bumex 1 mg PO daily and aldactone 25 mg daily. TTE with normal LVSF. Monitor UO.    4) Hypercalcemia: Mild and likely due to primary HPT unmasked in setting of diuresis. Given plans for discharge, patient will need outpatient work up. If patient not discharged, check iPTH with AM labs. Monitor serum calcium.    5) PE: As per primary team.    No renal objection to discharge with outpatient follow up.      [facility] NEPHROLOGY  Eric Acosta M.D.  Mitch Tatum D.O.  Paty Lama M.D.  Yanci Mirza, MSN, ANP-C    Telephone: [phone]  Facsimile: [phone]    [address] [place] [address]

## 2023-03-28 NOTE — CHART NOTE - NSCHARTNOTEFT_GEN_A_CORE
Patient's loading dose of Eliquis 10 mg is now done with 7 days, transitioned to maintenance dose of Eliquis 5 mg BID as per cards recommendations

## 2023-04-04 ENCOUNTER — APPOINTMENT (OUTPATIENT)
Dept: PULMONOLOGY | Facility: CLINIC | Age: 78
End: 2023-04-04
Payer: MEDICARE

## 2023-04-04 VITALS
RESPIRATION RATE: 18 BRPM | SYSTOLIC BLOOD PRESSURE: 132 MMHG | DIASTOLIC BLOOD PRESSURE: 80 MMHG | HEART RATE: 110 BPM | OXYGEN SATURATION: 92 %

## 2023-04-04 DIAGNOSIS — R79.89 OTHER SPECIFIED ABNORMAL FINDINGS OF BLOOD CHEMISTRY: ICD-10-CM

## 2023-04-04 LAB
BASOPHILS # BLD AUTO: 0.06 K/UL
BASOPHILS NFR BLD AUTO: 0.8 %
EOSINOPHIL # BLD AUTO: 0.16 K/UL
EOSINOPHIL NFR BLD AUTO: 2 %
HCT VFR BLD CALC: 36.4 %
HGB BLD-MCNC: 10.3 G/DL
IMM GRANULOCYTES NFR BLD AUTO: 2.8 %
LYMPHOCYTES # BLD AUTO: 1.57 K/UL
LYMPHOCYTES NFR BLD AUTO: 19.7 %
MAN DIFF?: NORMAL
MCHC RBC-ENTMCNC: 26.6 PG
MCHC RBC-ENTMCNC: 28.3 GM/DL
MCV RBC AUTO: 94.1 FL
MONOCYTES # BLD AUTO: 0.87 K/UL
MONOCYTES NFR BLD AUTO: 10.9 %
NEUTROPHILS # BLD AUTO: 5.08 K/UL
NEUTROPHILS NFR BLD AUTO: 63.8 %
PLATELET # BLD AUTO: 485 K/UL
RBC # BLD: 3.87 M/UL
RBC # FLD: 16.4 %
WBC # FLD AUTO: 7.96 K/UL

## 2023-04-04 PROCEDURE — 99213 OFFICE O/P EST LOW 20 MIN: CPT | Mod: 25

## 2023-04-04 PROCEDURE — 36415 COLL VENOUS BLD VENIPUNCTURE: CPT

## 2023-04-04 RX ORDER — FUROSEMIDE 40 MG/1
40 TABLET ORAL
Refills: 0 | Status: DISCONTINUED | COMMUNITY
End: 2023-04-04

## 2023-04-04 RX ORDER — PREDNISONE 10 MG/1
10 TABLET ORAL
Qty: 30 | Refills: 0 | Status: DISCONTINUED | COMMUNITY
Start: 2023-02-27 | End: 2023-04-04

## 2023-04-04 RX ORDER — PROMETHAZINE HYDROCHLORIDE AND DEXTROMETHORPHAN HYDROBROMIDE ORAL SOLUTION 15; 6.25 MG/5ML; MG/5ML
6.25-15 SOLUTION ORAL
Qty: 120 | Refills: 0 | Status: DISCONTINUED | COMMUNITY
Start: 2017-11-04 | End: 2023-04-04

## 2023-04-04 RX ORDER — ETODOLAC 400 MG/1
400 TABLET, FILM COATED ORAL
Refills: 0 | Status: DISCONTINUED | COMMUNITY
End: 2023-04-04

## 2023-04-04 RX ORDER — POTASSIUM CHLORIDE 1500 MG/1
20 TABLET, EXTENDED RELEASE ORAL
Qty: 90 | Refills: 0 | Status: DISCONTINUED | COMMUNITY
Start: 2017-12-02 | End: 2023-04-04

## 2023-04-04 RX ORDER — METHYLPREDNISOLONE 4 MG/1
4 TABLET ORAL
Qty: 1 | Refills: 0 | Status: DISCONTINUED | COMMUNITY
Start: 2023-02-15 | End: 2023-04-04

## 2023-04-05 ENCOUNTER — TRANSCRIPTION ENCOUNTER (OUTPATIENT)
Age: 78
End: 2023-04-05

## 2023-04-05 LAB
ALBUMIN SERPL ELPH-MCNC: 4.4 G/DL
ALP BLD-CCNC: 60 U/L
ALT SERPL-CCNC: 12 U/L
ANION GAP SERPL CALC-SCNC: 15 MMOL/L
AST SERPL-CCNC: 13 U/L
BILIRUB SERPL-MCNC: 0.2 MG/DL
BUN SERPL-MCNC: 30 MG/DL
CALCIUM SERPL-MCNC: 11 MG/DL
CHLORIDE SERPL-SCNC: 104 MMOL/L
CO2 SERPL-SCNC: 23 MMOL/L
CREAT SERPL-MCNC: 1.58 MG/DL
DEPRECATED D DIMER PPP IA-ACNC: 571 NG/ML DDU
EGFR: 33 ML/MIN/1.73M2
GLUCOSE SERPL-MCNC: 121 MG/DL
POTASSIUM SERPL-SCNC: 4.7 MMOL/L
PROT SERPL-MCNC: 6.7 G/DL
SODIUM SERPL-SCNC: 142 MMOL/L

## 2023-04-05 NOTE — HISTORY OF PRESENT ILLNESS
[Never] : never [TextBox_4] : Hospitalized for shortness of breath underwent CTPA positive for bilateral pulmonary embolism.  Currently on Eliquis 5 mg twice daily tolerating well significant improvement in shortness of breath noted.

## 2023-04-05 NOTE — ASSESSMENT
[FreeTextEntry1] : Recovering from pulmonary embolism.  Feeling better.  Labs show improvement in elevated D-dimer noted in hospital.  Eventually will need follow-up imaging.  For now continue Eliquis.  Patient likely high risk for recurrent PE and should strongly be considered for indefinite duration anticoagulation will evaluate this in several months depending on her tolerance of anticoagulation.

## 2023-05-09 ENCOUNTER — APPOINTMENT (OUTPATIENT)
Dept: PULMONOLOGY | Facility: CLINIC | Age: 78
End: 2023-05-09
Payer: MEDICARE

## 2023-05-09 VITALS
SYSTOLIC BLOOD PRESSURE: 136 MMHG | WEIGHT: 267 LBS | BODY MASS INDEX: 50.41 KG/M2 | DIASTOLIC BLOOD PRESSURE: 82 MMHG | HEART RATE: 98 BPM | HEIGHT: 61 IN | OXYGEN SATURATION: 95 %

## 2023-05-09 PROCEDURE — 99214 OFFICE O/P EST MOD 30 MIN: CPT | Mod: 25

## 2023-05-09 PROCEDURE — 36415 COLL VENOUS BLD VENIPUNCTURE: CPT

## 2023-05-09 PROCEDURE — 94618 PULMONARY STRESS TESTING: CPT

## 2023-05-09 RX ORDER — DILTIAZEM HYDROCHLORIDE 180 MG/1
180 CAPSULE, EXTENDED RELEASE ORAL
Qty: 90 | Refills: 0 | Status: COMPLETED | COMMUNITY
Start: 2017-12-02 | End: 2023-05-09

## 2023-05-09 RX ORDER — PITAVASTATIN CALCIUM 2.09 MG/1
2 TABLET, FILM COATED ORAL
Qty: 90 | Refills: 0 | Status: COMPLETED | COMMUNITY
Start: 2021-04-24 | End: 2023-05-09

## 2023-05-09 RX ORDER — OLMESARTAN MEDOXOMIL 40 MG/1
40 TABLET, FILM COATED ORAL
Qty: 90 | Refills: 0 | Status: COMPLETED | COMMUNITY
Start: 2017-10-08 | End: 2023-05-09

## 2023-05-09 NOTE — PROCEDURE
[FreeTextEntry1] : Pulmonary stress test only ambulated 2 minutes prompt oxygen desaturation\par \par CPAP data download confirms excellent compliance

## 2023-05-09 NOTE — HISTORY OF PRESENT ILLNESS
[TextBox_4] : Follow-up for sleep apnea obesity chronic airways disease and recent pulmonary embolism.  Actually having worsening shortness of breath with reducing exercise tolerance.  Continues on Eliquis at maintenance dose.

## 2023-05-09 NOTE — ASSESSMENT
[FreeTextEntry1] : Exertional oxygen desaturation in setting of recent pulmonary embolus.  Need to be concerned about inadequate pulmonary embolism treatment.  There is significant controversy about the use of DOACs in patients who are extremely obese.  Although the literature does support the use of DOACs over warfarin in this situation, the concern about suboptimal dosing is real.  Recommend repeat imaging.  If there is evidence of new disease or that the current disease has not adequately resolved consider changing to warfarin for long-term management.  Will obtain D-dimer proBNP to assess urgency of reassessment

## 2023-05-10 ENCOUNTER — APPOINTMENT (OUTPATIENT)
Dept: CT IMAGING | Facility: IMAGING CENTER | Age: 78
End: 2023-05-10
Payer: MEDICARE

## 2023-05-10 ENCOUNTER — OUTPATIENT (OUTPATIENT)
Dept: OUTPATIENT SERVICES | Facility: HOSPITAL | Age: 78
LOS: 1 days | End: 2023-05-10
Payer: MEDICARE

## 2023-05-10 DIAGNOSIS — I26.99 OTHER PULMONARY EMBOLISM WITHOUT ACUTE COR PULMONALE: ICD-10-CM

## 2023-05-10 DIAGNOSIS — Z96.659 PRESENCE OF UNSPECIFIED ARTIFICIAL KNEE JOINT: Chronic | ICD-10-CM

## 2023-05-10 DIAGNOSIS — Z98.891 HISTORY OF UTERINE SCAR FROM PREVIOUS SURGERY: Chronic | ICD-10-CM

## 2023-05-10 DIAGNOSIS — Z98.890 OTHER SPECIFIED POSTPROCEDURAL STATES: Chronic | ICD-10-CM

## 2023-05-10 LAB
ALBUMIN SERPL ELPH-MCNC: 4.6 G/DL
ALP BLD-CCNC: 65 U/L
ALT SERPL-CCNC: 13 U/L
ANION GAP SERPL CALC-SCNC: 17 MMOL/L
AST SERPL-CCNC: 17 U/L
BASOPHILS # BLD AUTO: 0.06 K/UL
BASOPHILS NFR BLD AUTO: 0.7 %
BILIRUB SERPL-MCNC: 0.2 MG/DL
BUN SERPL-MCNC: 40 MG/DL
CALCIUM SERPL-MCNC: 10.6 MG/DL
CHLORIDE SERPL-SCNC: 103 MMOL/L
CO2 SERPL-SCNC: 21 MMOL/L
CREAT SERPL-MCNC: 1.68 MG/DL
DEPRECATED D DIMER PPP IA-ACNC: 196 NG/ML DDU
EGFR: 31 ML/MIN/1.73M2
EOSINOPHIL # BLD AUTO: 0.12 K/UL
EOSINOPHIL NFR BLD AUTO: 1.5 %
GLUCOSE SERPL-MCNC: 112 MG/DL
HCT VFR BLD CALC: 35.9 %
HGB BLD-MCNC: 10.3 G/DL
IMM GRANULOCYTES NFR BLD AUTO: 0.5 %
LYMPHOCYTES # BLD AUTO: 1.96 K/UL
LYMPHOCYTES NFR BLD AUTO: 24 %
MAN DIFF?: NORMAL
MCHC RBC-ENTMCNC: 24.9 PG
MCHC RBC-ENTMCNC: 28.7 GM/DL
MCV RBC AUTO: 86.7 FL
MONOCYTES # BLD AUTO: 0.99 K/UL
MONOCYTES NFR BLD AUTO: 12.1 %
NEUTROPHILS # BLD AUTO: 4.99 K/UL
NEUTROPHILS NFR BLD AUTO: 61.2 %
NT-PROBNP SERPL-MCNC: 80 PG/ML
PLATELET # BLD AUTO: 502 K/UL
POTASSIUM SERPL-SCNC: 4.3 MMOL/L
PROT SERPL-MCNC: 7.4 G/DL
RBC # BLD: 4.14 M/UL
RBC # FLD: 16.3 %
SODIUM SERPL-SCNC: 141 MMOL/L
WBC # FLD AUTO: 8.16 K/UL

## 2023-05-10 PROCEDURE — 71275 CT ANGIOGRAPHY CHEST: CPT

## 2023-05-10 PROCEDURE — 71275 CT ANGIOGRAPHY CHEST: CPT | Mod: 26,MH

## 2023-06-26 ENCOUNTER — NON-APPOINTMENT (OUTPATIENT)
Age: 78
End: 2023-06-26

## 2023-06-29 ENCOUNTER — APPOINTMENT (OUTPATIENT)
Dept: PULMONOLOGY | Facility: CLINIC | Age: 78
End: 2023-06-29
Payer: MEDICARE

## 2023-06-29 VITALS — SYSTOLIC BLOOD PRESSURE: 125 MMHG | HEART RATE: 98 BPM | DIASTOLIC BLOOD PRESSURE: 84 MMHG | OXYGEN SATURATION: 96 %

## 2023-06-29 PROCEDURE — 94618 PULMONARY STRESS TESTING: CPT

## 2023-06-29 PROCEDURE — 99213 OFFICE O/P EST LOW 20 MIN: CPT | Mod: 25

## 2023-06-30 NOTE — PROCEDURE
[FreeTextEntry1] : Pulmonary stress test demonstrates significant interval improvement in degree of oxygen desaturation with only minimal oxygen desaturation is noted and patient was able to complete 6 minutes of exertion\par

## 2023-06-30 NOTE — ADDENDUM
[FreeTextEntry1] : I, Alfredo Vanessaallison, acted solely as a scribe for Dr. Ra Valenzuela M.D. on this date 06/29/2023. \par \par All medical record entries made by the Scribe were at my, Dr. Ra Valenzuela M.D., direction and personally dictated by me on 06/29/2023. I have reviewed the chart and agree that the record accurately reflects my personal performance of the history, physical exam, assessment and plan. I have also personally directed, reviewed, and agreed with the chart.

## 2023-06-30 NOTE — HISTORY OF PRESENT ILLNESS
[TextBox_4] : Prior: Follow-up for sleep apnea obesity chronic airways disease and recent pulmonary embolism.  Actually having worsening shortness of breath with reducing exercise tolerance.  Continues on Eliquis at maintenance dose.\par \par Current: TORI WOOTEN is a 78 year old female, with history of Obstructive sleep apnea, obesity, COPD, Pulmonary embolism, who presents to the office for follow up evaluation. Patient reports that she continues to have dyspnea which has been improving. She states that she continues to take Eliquis at a maintenance dose.

## 2023-06-30 NOTE — ASSESSMENT
[FreeTextEntry1] : Ms. TORI WOOTEN is an 78 year old female, history of Obstructive sleep apnea, obesity, COPD, Pulmonary embolism. Patient appears improving from a pulmonary standpoint. 6 minute walk test today indicates a significant improvement in O2 saturation. Follow up in 1 month.

## 2023-07-25 ENCOUNTER — APPOINTMENT (OUTPATIENT)
Dept: PULMONOLOGY | Facility: CLINIC | Age: 78
End: 2023-07-25

## 2023-07-26 ENCOUNTER — NON-APPOINTMENT (OUTPATIENT)
Age: 78
End: 2023-07-26

## 2023-07-27 ENCOUNTER — APPOINTMENT (OUTPATIENT)
Dept: PULMONOLOGY | Facility: CLINIC | Age: 78
End: 2023-07-27
Payer: MEDICARE

## 2023-07-27 VITALS — OXYGEN SATURATION: 94 % | HEART RATE: 114 BPM | DIASTOLIC BLOOD PRESSURE: 72 MMHG | SYSTOLIC BLOOD PRESSURE: 139 MMHG

## 2023-07-27 PROCEDURE — ZZZZZ: CPT

## 2023-07-27 PROCEDURE — 94729 DIFFUSING CAPACITY: CPT

## 2023-07-27 PROCEDURE — 94618 PULMONARY STRESS TESTING: CPT

## 2023-07-27 PROCEDURE — 99214 OFFICE O/P EST MOD 30 MIN: CPT | Mod: 25

## 2023-07-27 PROCEDURE — 94060 EVALUATION OF WHEEZING: CPT

## 2023-07-27 PROCEDURE — 36415 COLL VENOUS BLD VENIPUNCTURE: CPT

## 2023-07-27 PROCEDURE — 94727 GAS DIL/WSHOT DETER LNG VOL: CPT

## 2023-07-27 NOTE — REASON FOR VISIT
[Follow-Up] : a follow-up visit [Pulmonary Embolism] : pulmonary embolism [Shortness of Breath] : shortness of breath [Asthma] : asthma

## 2023-07-28 LAB
ALBUMIN SERPL ELPH-MCNC: 4.4 G/DL
ALP BLD-CCNC: 67 U/L
ALT SERPL-CCNC: 14 U/L
ANION GAP SERPL CALC-SCNC: 17 MMOL/L
AST SERPL-CCNC: 18 U/L
BILIRUB SERPL-MCNC: <0.2 MG/DL
BUN SERPL-MCNC: 41 MG/DL
CALCIUM SERPL-MCNC: 9.4 MG/DL
CHLORIDE SERPL-SCNC: 105 MMOL/L
CO2 SERPL-SCNC: 18 MMOL/L
CREAT SERPL-MCNC: 1.83 MG/DL
DEPRECATED D DIMER PPP IA-ACNC: 152 NG/ML DDU
EGFR: 28 ML/MIN/1.73M2
GLUCOSE SERPL-MCNC: 120 MG/DL
NT-PROBNP SERPL-MCNC: 50 PG/ML
POTASSIUM SERPL-SCNC: 4.6 MMOL/L
PROT SERPL-MCNC: 6.8 G/DL
SODIUM SERPL-SCNC: 140 MMOL/L

## 2023-07-28 NOTE — HISTORY OF PRESENT ILLNESS
[Never] : never [TextBox_4] : Prior: Patient reports that she continues to have dyspnea which has been improving. She states that she continues to take Eliquis at a maintenance dose.\par \par Current: TORI WOOTEN is a 78 year old female, with history of Obstructive sleep apnea, obesity, asthma with COPD, Pulmonary embolism, who presents to the office for follow up evaluation. Patient reports that her dyspnea intermittently worsens and improves from a case to case basis. She also states of having an elevated heart rate. Patient reports that she continues to take Trelegy for asthma treatment.  Still with shortness of breath on exertion does not appear to be fully recovering after her pulmonary embolism

## 2023-07-28 NOTE — ADDENDUM
[FreeTextEntry1] : I, Alfredo Vanessaallison, acted solely as a scribe for Dr. Ra Valenzuela M.D. on this date 07/27/2023. \par \par All medical record entries made by the Scribe were at my, Dr. Ra Valenzuela M.D., direction and personally dictated by me on 07/27/2023. I have reviewed the chart and agree that the record accurately reflects my personal performance of the history, physical exam, assessment and plan. I have also personally directed, reviewed, and agreed with the chart. \par \par Addendum-July 28, 2023\par Labs noted for mild anemia-need to monitor on anticoagulation

## 2023-07-28 NOTE — PROCEDURE
[FreeTextEntry1] : Stable PFT wit no interval change.\par \par Venipuncture with labs drawn in office.

## 2023-07-28 NOTE — ASSESSMENT
[FreeTextEntry1] : Ms. TORI WOOTEN is an 78 year old female, history of Obstructive sleep apnea, obesity, asthma with COPD, Pulmonary embolism. Patient continues to have dyspnea which could be due to post pulmonary embolism complications. Continue Trelegy Ellipta daily and will start her on Montelukast for stronger asthma treatment.  Considering change to Coumadin to ensure adequacy of anticoagulation as possible suboptimal anticoagulation due to weight is contributing to her symptoms

## 2023-08-01 ENCOUNTER — INPATIENT (INPATIENT)
Facility: HOSPITAL | Age: 78
LOS: 5 days | Discharge: HOME HEALTH | End: 2023-08-07
Attending: INTERNAL MEDICINE | Admitting: INTERNAL MEDICINE
Payer: MEDICARE

## 2023-08-01 VITALS
TEMPERATURE: 98 F | RESPIRATION RATE: 20 BRPM | DIASTOLIC BLOOD PRESSURE: 71 MMHG | OXYGEN SATURATION: 100 % | SYSTOLIC BLOOD PRESSURE: 134 MMHG | HEART RATE: 96 BPM

## 2023-08-01 DIAGNOSIS — Z98.891 HISTORY OF UTERINE SCAR FROM PREVIOUS SURGERY: Chronic | ICD-10-CM

## 2023-08-01 DIAGNOSIS — Z96.659 PRESENCE OF UNSPECIFIED ARTIFICIAL KNEE JOINT: Chronic | ICD-10-CM

## 2023-08-01 DIAGNOSIS — Z98.890 OTHER SPECIFIED POSTPROCEDURAL STATES: Chronic | ICD-10-CM

## 2023-08-01 DIAGNOSIS — Z90.721 ACQUIRED ABSENCE OF OVARIES, UNILATERAL: Chronic | ICD-10-CM

## 2023-08-01 LAB
ALBUMIN SERPL ELPH-MCNC: 4.5 G/DL — SIGNIFICANT CHANGE UP (ref 3.3–5)
ALP SERPL-CCNC: 108 U/L — SIGNIFICANT CHANGE UP (ref 40–120)
ALT FLD-CCNC: 50 U/L — HIGH (ref 4–33)
ANION GAP SERPL CALC-SCNC: 14 MMOL/L — SIGNIFICANT CHANGE UP (ref 7–14)
APTT BLD: 33.8 SEC — SIGNIFICANT CHANGE UP (ref 24.5–35.6)
AST SERPL-CCNC: 57 U/L — HIGH (ref 4–32)
B PERT DNA SPEC QL NAA+PROBE: SIGNIFICANT CHANGE UP
B PERT+PARAPERT DNA PNL SPEC NAA+PROBE: SIGNIFICANT CHANGE UP
BASOPHILS # BLD AUTO: 0.06 K/UL — SIGNIFICANT CHANGE UP (ref 0–0.2)
BASOPHILS NFR BLD AUTO: 0.9 % — SIGNIFICANT CHANGE UP (ref 0–2)
BILIRUB SERPL-MCNC: 0.3 MG/DL — SIGNIFICANT CHANGE UP (ref 0.2–1.2)
BLD GP AB SCN SERPL QL: NEGATIVE — SIGNIFICANT CHANGE UP
BORDETELLA PARAPERTUSSIS (RAPRVP): SIGNIFICANT CHANGE UP
BUN SERPL-MCNC: 34 MG/DL — HIGH (ref 7–23)
C PNEUM DNA SPEC QL NAA+PROBE: SIGNIFICANT CHANGE UP
CALCIUM SERPL-MCNC: 10 MG/DL — SIGNIFICANT CHANGE UP (ref 8.4–10.5)
CHLORIDE SERPL-SCNC: 101 MMOL/L — SIGNIFICANT CHANGE UP (ref 98–107)
CO2 SERPL-SCNC: 23 MMOL/L — SIGNIFICANT CHANGE UP (ref 22–31)
CREAT SERPL-MCNC: 1.73 MG/DL — HIGH (ref 0.5–1.3)
EGFR: 30 ML/MIN/1.73M2 — LOW
EOSINOPHIL # BLD AUTO: 0.33 K/UL — SIGNIFICANT CHANGE UP (ref 0–0.5)
EOSINOPHIL NFR BLD AUTO: 5.1 % — SIGNIFICANT CHANGE UP (ref 0–6)
FLUAV SUBTYP SPEC NAA+PROBE: SIGNIFICANT CHANGE UP
FLUBV RNA SPEC QL NAA+PROBE: SIGNIFICANT CHANGE UP
GLUCOSE SERPL-MCNC: 120 MG/DL — HIGH (ref 70–99)
HADV DNA SPEC QL NAA+PROBE: SIGNIFICANT CHANGE UP
HCOV 229E RNA SPEC QL NAA+PROBE: SIGNIFICANT CHANGE UP
HCOV HKU1 RNA SPEC QL NAA+PROBE: SIGNIFICANT CHANGE UP
HCOV NL63 RNA SPEC QL NAA+PROBE: SIGNIFICANT CHANGE UP
HCOV OC43 RNA SPEC QL NAA+PROBE: SIGNIFICANT CHANGE UP
HCT VFR BLD CALC: 27.8 % — LOW (ref 34.5–45)
HGB BLD-MCNC: 8 G/DL — LOW (ref 11.5–15.5)
HMPV RNA SPEC QL NAA+PROBE: SIGNIFICANT CHANGE UP
HPIV1 RNA SPEC QL NAA+PROBE: SIGNIFICANT CHANGE UP
HPIV2 RNA SPEC QL NAA+PROBE: SIGNIFICANT CHANGE UP
HPIV3 RNA SPEC QL NAA+PROBE: SIGNIFICANT CHANGE UP
HPIV4 RNA SPEC QL NAA+PROBE: SIGNIFICANT CHANGE UP
IANC: 3.97 K/UL — SIGNIFICANT CHANGE UP (ref 1.8–7.4)
IMM GRANULOCYTES NFR BLD AUTO: 1.1 % — HIGH (ref 0–0.9)
INR BLD: 1.17 RATIO — SIGNIFICANT CHANGE UP (ref 0.85–1.18)
LYMPHOCYTES # BLD AUTO: 1.33 K/UL — SIGNIFICANT CHANGE UP (ref 1–3.3)
LYMPHOCYTES # BLD AUTO: 20.6 % — SIGNIFICANT CHANGE UP (ref 13–44)
M PNEUMO DNA SPEC QL NAA+PROBE: SIGNIFICANT CHANGE UP
MCHC RBC-ENTMCNC: 21.9 PG — LOW (ref 27–34)
MCHC RBC-ENTMCNC: 28.8 GM/DL — LOW (ref 32–36)
MCV RBC AUTO: 76 FL — LOW (ref 80–100)
MONOCYTES # BLD AUTO: 0.71 K/UL — SIGNIFICANT CHANGE UP (ref 0–0.9)
MONOCYTES NFR BLD AUTO: 11 % — SIGNIFICANT CHANGE UP (ref 2–14)
NEUTROPHILS # BLD AUTO: 3.97 K/UL — SIGNIFICANT CHANGE UP (ref 1.8–7.4)
NEUTROPHILS NFR BLD AUTO: 61.3 % — SIGNIFICANT CHANGE UP (ref 43–77)
NRBC # BLD: 0 /100 WBCS — SIGNIFICANT CHANGE UP (ref 0–0)
NRBC # FLD: 0.02 K/UL — HIGH (ref 0–0)
NT-PROBNP SERPL-SCNC: 68 PG/ML — SIGNIFICANT CHANGE UP
PLATELET # BLD AUTO: 376 K/UL — SIGNIFICANT CHANGE UP (ref 150–400)
POTASSIUM SERPL-MCNC: 4.2 MMOL/L — SIGNIFICANT CHANGE UP (ref 3.5–5.3)
POTASSIUM SERPL-SCNC: 4.2 MMOL/L — SIGNIFICANT CHANGE UP (ref 3.5–5.3)
PROT SERPL-MCNC: 7.6 G/DL — SIGNIFICANT CHANGE UP (ref 6–8.3)
PROTHROM AB SERPL-ACNC: 13.2 SEC — HIGH (ref 9.5–13)
RAPID RVP RESULT: SIGNIFICANT CHANGE UP
RBC # BLD: 3.66 M/UL — LOW (ref 3.8–5.2)
RBC # FLD: 18.6 % — HIGH (ref 10.3–14.5)
RH IG SCN BLD-IMP: POSITIVE — SIGNIFICANT CHANGE UP
RSV RNA SPEC QL NAA+PROBE: SIGNIFICANT CHANGE UP
RV+EV RNA SPEC QL NAA+PROBE: SIGNIFICANT CHANGE UP
SARS-COV-2 RNA SPEC QL NAA+PROBE: SIGNIFICANT CHANGE UP
SODIUM SERPL-SCNC: 138 MMOL/L — SIGNIFICANT CHANGE UP (ref 135–145)
TROPONIN T, HIGH SENSITIVITY RESULT: 16 NG/L — SIGNIFICANT CHANGE UP
WBC # BLD: 6.47 K/UL — SIGNIFICANT CHANGE UP (ref 3.8–10.5)
WBC # FLD AUTO: 6.47 K/UL — SIGNIFICANT CHANGE UP (ref 3.8–10.5)

## 2023-08-01 PROCEDURE — 71046 X-RAY EXAM CHEST 2 VIEWS: CPT | Mod: 26

## 2023-08-01 PROCEDURE — 99285 EMERGENCY DEPT VISIT HI MDM: CPT

## 2023-08-01 RX ORDER — ACETAMINOPHEN 500 MG
1000 TABLET ORAL ONCE
Refills: 0 | Status: COMPLETED | OUTPATIENT
Start: 2023-08-01 | End: 2023-08-01

## 2023-08-01 RX ADMIN — Medication 400 MILLIGRAM(S): at 21:35

## 2023-08-01 RX ADMIN — Medication 1000 MILLIGRAM(S): at 22:05

## 2023-08-01 NOTE — ED PROVIDER NOTE - OBJECTIVE STATEMENT
77F PMH HTN, HLD, GERD, DM 2, DURAN, paroxysmal A-fib, Bilateral Pulmonary Embolism on Eliquis Presenting to the emergency department for shortness of breath.  Patient reports that she has felt short of breath for a few weeks now, progressively worsening, saw her doctor who checked a D-dimer which was negative, also reports that when he walked her around he noted her heart rate elevated and her oxygen saturation dropped.  Reporting she returns to the doctor again today for continued shortness of breath and they sent her to the hospital for further evaluation.  She also reports that she had a two-point drop in her hemoglobin over the period of about a month, has not noted any recent bleeding, melena, BRBPR.  As a result her doctor decreased her Eliquis dose from 5 mg twice a day to 2-1/2 mg twice a day. Denies any recent fevers, chills, cough, chest pain, abdominal pain, vomiting, diarrhea, dysuria, calf pain, new edema.  Reports she has chronic arthritis for which she utilizes Tylenol.

## 2023-08-01 NOTE — ED PROVIDER NOTE - CLINICAL SUMMARY MEDICAL DECISION MAKING FREE TEXT BOX
78-year-old female with history as noted above presenting to the emergency department with progressively worsening shortness of breath over the last few weeks.  On exam she is awake and alert, unlabored at rest however becomes short of breath on minimal exertion, at this time her oxygenation is normal on room air, lungs are clear, abdomen soft and nontender.  Pulses are equal bilaterally.  Given history and presentation discussed plan for labs, screening chest x-ray, CT angio, EKG, to evaluate for differential including but not limited to anemia, electrolyte disturbance, organ dysfunction, infections, effusions, worsening PE, evaluate degree of anemia.  At this time no evidence of bleeding on exam.  Patient and daughter at bedside informed and are agreeable

## 2023-08-01 NOTE — ED ADULT NURSE NOTE - NSFALLHARMRISKINTERV_ED_ALL_ED
Assistance OOB with selected safe patient handling equipment if applicable/Communicate risk of Fall with Harm to all staff, patient, and family/Monitor gait and stability/Provide patient with walking aids/Provide visual cue: red socks, yellow wristband, yellow gown, etc/Reinforce activity limits and safety measures with patient and family/Bed in lowest position, wheels locked, appropriate side rails in place/Call bell, personal items and telephone in reach/Instruct patient to call for assistance before getting out of bed/chair/stretcher/Non-slip footwear applied when patient is off stretcher/[place] to call system/Physically safe environment - no spills, clutter or unnecessary equipment/Purposeful Proactive Rounding/Room/bathroom lighting operational, light cord in reach

## 2023-08-01 NOTE — ED ADULT TRIAGE NOTE - CHIEF COMPLAINT QUOTE
Pt presents to ED ambulatory with cane with c/o worsening shortness of breath x several weeks. Pt was seen outpatinet at pulmonologist office and found to be anemic with HGB=8. Pt denies noticing blood in stool or urine. Pt was seen at primary care office today and advised to come to ED for further eval. Pt short of breath speaking in 2-3 word sentences. pt has hx of DVT and PE.

## 2023-08-01 NOTE — ED ADULT NURSE NOTE - OBJECTIVE STATEMENT
Er pt sent by PMD for low Hgb on Eliquis for Afib and DVT, PE pt AOX 3 cooperative pt morbid obese, c/o upper back pain, labs sent IV to right lat. hand, 20g angio cath place pt has poor venous access, pt denies dizziness, no HA, no SOB, pending dispo.     Neyda Durbin RN.

## 2023-08-02 DIAGNOSIS — K21.9 GASTRO-ESOPHAGEAL REFLUX DISEASE WITHOUT ESOPHAGITIS: ICD-10-CM

## 2023-08-02 DIAGNOSIS — J45.909 UNSPECIFIED ASTHMA, UNCOMPLICATED: ICD-10-CM

## 2023-08-02 DIAGNOSIS — D64.9 ANEMIA, UNSPECIFIED: ICD-10-CM

## 2023-08-02 DIAGNOSIS — R06.02 SHORTNESS OF BREATH: ICD-10-CM

## 2023-08-02 DIAGNOSIS — R06.09 OTHER FORMS OF DYSPNEA: ICD-10-CM

## 2023-08-02 DIAGNOSIS — N18.30 CHRONIC KIDNEY DISEASE, STAGE 3 UNSPECIFIED: ICD-10-CM

## 2023-08-02 DIAGNOSIS — R09.89 OTHER SPECIFIED SYMPTOMS AND SIGNS INVOLVING THE CIRCULATORY AND RESPIRATORY SYSTEMS: ICD-10-CM

## 2023-08-02 DIAGNOSIS — I10 ESSENTIAL (PRIMARY) HYPERTENSION: ICD-10-CM

## 2023-08-02 DIAGNOSIS — Z29.9 ENCOUNTER FOR PROPHYLACTIC MEASURES, UNSPECIFIED: ICD-10-CM

## 2023-08-02 DIAGNOSIS — E11.9 TYPE 2 DIABETES MELLITUS WITHOUT COMPLICATIONS: ICD-10-CM

## 2023-08-02 DIAGNOSIS — G47.33 OBSTRUCTIVE SLEEP APNEA (ADULT) (PEDIATRIC): ICD-10-CM

## 2023-08-02 DIAGNOSIS — I48.0 PAROXYSMAL ATRIAL FIBRILLATION: ICD-10-CM

## 2023-08-02 DIAGNOSIS — Z86.711 PERSONAL HISTORY OF PULMONARY EMBOLISM: ICD-10-CM

## 2023-08-02 LAB
A1C WITH ESTIMATED AVERAGE GLUCOSE RESULT: 5.9 % — HIGH (ref 4–5.6)
ALBUMIN SERPL ELPH-MCNC: 4.2 G/DL — SIGNIFICANT CHANGE UP (ref 3.3–5)
ALP SERPL-CCNC: 111 U/L — SIGNIFICANT CHANGE UP (ref 40–120)
ALT FLD-CCNC: 50 U/L — HIGH (ref 4–33)
ANION GAP SERPL CALC-SCNC: 12 MMOL/L — SIGNIFICANT CHANGE UP (ref 7–14)
AST SERPL-CCNC: 55 U/L — HIGH (ref 4–32)
BASOPHILS # BLD AUTO: 0.05 K/UL — SIGNIFICANT CHANGE UP (ref 0–0.2)
BASOPHILS NFR BLD AUTO: 0.7 % — SIGNIFICANT CHANGE UP (ref 0–2)
BILIRUB SERPL-MCNC: 0.5 MG/DL — SIGNIFICANT CHANGE UP (ref 0.2–1.2)
BUN SERPL-MCNC: 26 MG/DL — HIGH (ref 7–23)
CALCIUM SERPL-MCNC: 10 MG/DL — SIGNIFICANT CHANGE UP (ref 8.4–10.5)
CHLORIDE SERPL-SCNC: 102 MMOL/L — SIGNIFICANT CHANGE UP (ref 98–107)
CO2 SERPL-SCNC: 25 MMOL/L — SIGNIFICANT CHANGE UP (ref 22–31)
CREAT SERPL-MCNC: 1.56 MG/DL — HIGH (ref 0.5–1.3)
EGFR: 33 ML/MIN/1.73M2 — LOW
EOSINOPHIL # BLD AUTO: 0.35 K/UL — SIGNIFICANT CHANGE UP (ref 0–0.5)
EOSINOPHIL NFR BLD AUTO: 5.1 % — SIGNIFICANT CHANGE UP (ref 0–6)
ESTIMATED AVERAGE GLUCOSE: 123 — SIGNIFICANT CHANGE UP
GLUCOSE BLDC GLUCOMTR-MCNC: 114 MG/DL — HIGH (ref 70–99)
GLUCOSE BLDC GLUCOMTR-MCNC: 128 MG/DL — HIGH (ref 70–99)
GLUCOSE BLDC GLUCOMTR-MCNC: 151 MG/DL — HIGH (ref 70–99)
GLUCOSE SERPL-MCNC: 100 MG/DL — HIGH (ref 70–99)
HCT VFR BLD CALC: 28.5 % — LOW (ref 34.5–45)
HGB BLD-MCNC: 8.1 G/DL — LOW (ref 11.5–15.5)
IANC: 4.35 K/UL — SIGNIFICANT CHANGE UP (ref 1.8–7.4)
IMM GRANULOCYTES NFR BLD AUTO: 1.2 % — HIGH (ref 0–0.9)
LYMPHOCYTES # BLD AUTO: 1.31 K/UL — SIGNIFICANT CHANGE UP (ref 1–3.3)
LYMPHOCYTES # BLD AUTO: 19 % — SIGNIFICANT CHANGE UP (ref 13–44)
MAGNESIUM SERPL-MCNC: 2.1 MG/DL — SIGNIFICANT CHANGE UP (ref 1.6–2.6)
MCHC RBC-ENTMCNC: 21.5 PG — LOW (ref 27–34)
MCHC RBC-ENTMCNC: 28.4 GM/DL — LOW (ref 32–36)
MCV RBC AUTO: 75.6 FL — LOW (ref 80–100)
MONOCYTES # BLD AUTO: 0.77 K/UL — SIGNIFICANT CHANGE UP (ref 0–0.9)
MONOCYTES NFR BLD AUTO: 11.1 % — SIGNIFICANT CHANGE UP (ref 2–14)
NEUTROPHILS # BLD AUTO: 4.35 K/UL — SIGNIFICANT CHANGE UP (ref 1.8–7.4)
NEUTROPHILS NFR BLD AUTO: 62.9 % — SIGNIFICANT CHANGE UP (ref 43–77)
NRBC # BLD: 0 /100 WBCS — SIGNIFICANT CHANGE UP (ref 0–0)
NRBC # FLD: 0 K/UL — SIGNIFICANT CHANGE UP (ref 0–0)
PLATELET # BLD AUTO: 387 K/UL — SIGNIFICANT CHANGE UP (ref 150–400)
POTASSIUM SERPL-MCNC: 4.3 MMOL/L — SIGNIFICANT CHANGE UP (ref 3.5–5.3)
POTASSIUM SERPL-SCNC: 4.3 MMOL/L — SIGNIFICANT CHANGE UP (ref 3.5–5.3)
PROT SERPL-MCNC: 7.4 G/DL — SIGNIFICANT CHANGE UP (ref 6–8.3)
RBC # BLD: 3.77 M/UL — LOW (ref 3.8–5.2)
RBC # FLD: 18.8 % — HIGH (ref 10.3–14.5)
SODIUM SERPL-SCNC: 139 MMOL/L — SIGNIFICANT CHANGE UP (ref 135–145)
TSH SERPL-MCNC: 0.51 UIU/ML — SIGNIFICANT CHANGE UP (ref 0.27–4.2)
WBC # BLD: 6.91 K/UL — SIGNIFICANT CHANGE UP (ref 3.8–10.5)
WBC # FLD AUTO: 6.91 K/UL — SIGNIFICANT CHANGE UP (ref 3.8–10.5)

## 2023-08-02 PROCEDURE — 71275 CT ANGIOGRAPHY CHEST: CPT | Mod: 26,MA

## 2023-08-02 PROCEDURE — 93306 TTE W/DOPPLER COMPLETE: CPT | Mod: 26

## 2023-08-02 PROCEDURE — 99222 1ST HOSP IP/OBS MODERATE 55: CPT

## 2023-08-02 RX ORDER — ACETAMINOPHEN 500 MG
650 TABLET ORAL EVERY 6 HOURS
Refills: 0 | Status: DISCONTINUED | OUTPATIENT
Start: 2023-08-02 | End: 2023-08-07

## 2023-08-02 RX ORDER — BUDESONIDE AND FORMOTEROL FUMARATE DIHYDRATE 160; 4.5 UG/1; UG/1
2 AEROSOL RESPIRATORY (INHALATION)
Refills: 0 | Status: DISCONTINUED | OUTPATIENT
Start: 2023-08-02 | End: 2023-08-04

## 2023-08-02 RX ORDER — INSULIN LISPRO 100/ML
VIAL (ML) SUBCUTANEOUS AT BEDTIME
Refills: 0 | Status: DISCONTINUED | OUTPATIENT
Start: 2023-08-02 | End: 2023-08-07

## 2023-08-02 RX ORDER — DEXTROSE 50 % IN WATER 50 %
12.5 SYRINGE (ML) INTRAVENOUS ONCE
Refills: 0 | Status: DISCONTINUED | OUTPATIENT
Start: 2023-08-02 | End: 2023-08-07

## 2023-08-02 RX ORDER — GLUCAGON INJECTION, SOLUTION 0.5 MG/.1ML
1 INJECTION, SOLUTION SUBCUTANEOUS ONCE
Refills: 0 | Status: DISCONTINUED | OUTPATIENT
Start: 2023-08-02 | End: 2023-08-07

## 2023-08-02 RX ORDER — CINACALCET 30 MG/1
60 TABLET, FILM COATED ORAL DAILY
Refills: 0 | Status: DISCONTINUED | OUTPATIENT
Start: 2023-08-02 | End: 2023-08-07

## 2023-08-02 RX ORDER — DEXTROSE 50 % IN WATER 50 %
25 SYRINGE (ML) INTRAVENOUS ONCE
Refills: 0 | Status: DISCONTINUED | OUTPATIENT
Start: 2023-08-02 | End: 2023-08-07

## 2023-08-02 RX ORDER — BUMETANIDE 0.25 MG/ML
1 INJECTION INTRAMUSCULAR; INTRAVENOUS DAILY
Refills: 0 | Status: DISCONTINUED | OUTPATIENT
Start: 2023-08-03 | End: 2023-08-07

## 2023-08-02 RX ORDER — SODIUM CHLORIDE 9 MG/ML
1000 INJECTION INTRAMUSCULAR; INTRAVENOUS; SUBCUTANEOUS ONCE
Refills: 0 | Status: DISCONTINUED | OUTPATIENT
Start: 2023-08-02 | End: 2023-08-02

## 2023-08-02 RX ORDER — MONTELUKAST 4 MG/1
10 TABLET, CHEWABLE ORAL DAILY
Refills: 0 | Status: DISCONTINUED | OUTPATIENT
Start: 2023-08-02 | End: 2023-08-07

## 2023-08-02 RX ORDER — TIOTROPIUM BROMIDE 18 UG/1
2 CAPSULE ORAL; RESPIRATORY (INHALATION) DAILY
Refills: 0 | Status: DISCONTINUED | OUTPATIENT
Start: 2023-08-02 | End: 2023-08-07

## 2023-08-02 RX ORDER — SODIUM CHLORIDE 9 MG/ML
1000 INJECTION, SOLUTION INTRAVENOUS
Refills: 0 | Status: DISCONTINUED | OUTPATIENT
Start: 2023-08-02 | End: 2023-08-07

## 2023-08-02 RX ORDER — METFORMIN HYDROCHLORIDE 850 MG/1
1 TABLET ORAL
Qty: 0 | Refills: 0 | DISCHARGE

## 2023-08-02 RX ORDER — LOSARTAN POTASSIUM 100 MG/1
50 TABLET, FILM COATED ORAL DAILY
Refills: 0 | Status: DISCONTINUED | OUTPATIENT
Start: 2023-08-02 | End: 2023-08-02

## 2023-08-02 RX ORDER — LATANOPROST 0.05 MG/ML
1 SOLUTION/ DROPS OPHTHALMIC; TOPICAL AT BEDTIME
Refills: 0 | Status: DISCONTINUED | OUTPATIENT
Start: 2023-08-02 | End: 2023-08-07

## 2023-08-02 RX ORDER — IPRATROPIUM/ALBUTEROL SULFATE 18-103MCG
3 AEROSOL WITH ADAPTER (GRAM) INHALATION EVERY 6 HOURS
Refills: 0 | Status: DISCONTINUED | OUTPATIENT
Start: 2023-08-02 | End: 2023-08-04

## 2023-08-02 RX ORDER — INSULIN LISPRO 100/ML
VIAL (ML) SUBCUTANEOUS
Refills: 0 | Status: DISCONTINUED | OUTPATIENT
Start: 2023-08-02 | End: 2023-08-07

## 2023-08-02 RX ORDER — SPIRONOLACTONE 25 MG/1
25 TABLET, FILM COATED ORAL DAILY
Refills: 0 | Status: DISCONTINUED | OUTPATIENT
Start: 2023-08-03 | End: 2023-08-07

## 2023-08-02 RX ORDER — FOLIC ACID 0.8 MG
1 TABLET ORAL
Qty: 0 | Refills: 0 | DISCHARGE

## 2023-08-02 RX ORDER — SODIUM CHLORIDE 9 MG/ML
4 INJECTION INTRAMUSCULAR; INTRAVENOUS; SUBCUTANEOUS EVERY 12 HOURS
Refills: 0 | Status: DISCONTINUED | OUTPATIENT
Start: 2023-08-02 | End: 2023-08-04

## 2023-08-02 RX ORDER — PANTOPRAZOLE SODIUM 20 MG/1
40 TABLET, DELAYED RELEASE ORAL
Refills: 0 | Status: DISCONTINUED | OUTPATIENT
Start: 2023-08-02 | End: 2023-08-07

## 2023-08-02 RX ORDER — APIXABAN 2.5 MG/1
2.5 TABLET, FILM COATED ORAL
Refills: 0 | Status: DISCONTINUED | OUTPATIENT
Start: 2023-08-02 | End: 2023-08-07

## 2023-08-02 RX ORDER — DEXTROSE 50 % IN WATER 50 %
15 SYRINGE (ML) INTRAVENOUS ONCE
Refills: 0 | Status: DISCONTINUED | OUTPATIENT
Start: 2023-08-02 | End: 2023-08-07

## 2023-08-02 RX ADMIN — Medication 650 MILLIGRAM(S): at 17:05

## 2023-08-02 RX ADMIN — SODIUM CHLORIDE 4 MILLILITER(S): 9 INJECTION INTRAMUSCULAR; INTRAVENOUS; SUBCUTANEOUS at 21:30

## 2023-08-02 RX ADMIN — Medication 0: at 22:42

## 2023-08-02 RX ADMIN — Medication 650 MILLIGRAM(S): at 17:35

## 2023-08-02 RX ADMIN — BUDESONIDE AND FORMOTEROL FUMARATE DIHYDRATE 2 PUFF(S): 160; 4.5 AEROSOL RESPIRATORY (INHALATION) at 23:37

## 2023-08-02 RX ADMIN — MONTELUKAST 10 MILLIGRAM(S): 4 TABLET, CHEWABLE ORAL at 17:56

## 2023-08-02 RX ADMIN — LATANOPROST 1 DROP(S): 0.05 SOLUTION/ DROPS OPHTHALMIC; TOPICAL at 23:35

## 2023-08-02 RX ADMIN — Medication 3 MILLILITER(S): at 21:26

## 2023-08-02 RX ADMIN — Medication 3 MILLILITER(S): at 16:16

## 2023-08-02 RX ADMIN — Medication 650 MILLIGRAM(S): at 23:35

## 2023-08-02 RX ADMIN — CINACALCET 60 MILLIGRAM(S): 30 TABLET, FILM COATED ORAL at 17:56

## 2023-08-02 RX ADMIN — APIXABAN 2.5 MILLIGRAM(S): 2.5 TABLET, FILM COATED ORAL at 18:03

## 2023-08-02 RX ADMIN — Medication 1: at 18:48

## 2023-08-02 NOTE — H&P ADULT - PROBLEM SELECTOR PLAN 2
Recently downtrending H/H. Unclear etiology. No s/s bleeding, possible slow bleed over past several months since starting eliquis.   -FOBT   -Iron studies  -Monitor H/H daily   -Maintain active T&S   -Transfuse for Hgb <7  -Continue with eliquis for now due to recent history of PEs and Afib and no overt signs of bleeding.

## 2023-08-02 NOTE — H&P ADULT - PROBLEM SELECTOR PLAN 8
-Hold home olmesartan in setting of mild elevation of Cr from baseline and IV contrast in ED   -Restart as tolerated

## 2023-08-02 NOTE — H&P ADULT - ASSESSMENT
78F with PMH of HTN, HLD, NIDDM, paroxysmal afib on Eliquis, h/o PE's (3/2023), DURAN on CPAP, ?asthma (dx 2018), and GERD presenting with progressive EPPS over past month. Admitted for workup of EPPS and anemia.

## 2023-08-02 NOTE — H&P ADULT - NSHPLABSRESULTS_GEN_ALL_CORE
8.1    6.91  )-----------( 387      ( 02 Aug 2023 12:25 )             28.5       08-02    139  |  102  |  26<H>  ----------------------------<  100<H>  4.3   |  25  |  1.56<H>    Ca    10.0      02 Aug 2023 12:25  Mg     2.10     08-02    TPro  7.4  /  Alb  4.2  /  TBili  0.5  /  DBili  x   /  AST  55<H>  /  ALT  50<H>  /  AlkPhos  111  08-02              Urinalysis Basic - ( 02 Aug 2023 12:25 )    Color: x / Appearance: x / SG: x / pH: x  Gluc: 100 mg/dL / Ketone: x  / Bili: x / Urobili: x   Blood: x / Protein: x / Nitrite: x   Leuk Esterase: x / RBC: x / WBC x   Sq Epi: x / Non Sq Epi: x / Bacteria: x        PT/INR - ( 01 Aug 2023 19:30 )   PT: 13.2 sec;   INR: 1.17 ratio         PTT - ( 01 Aug 2023 19:30 )  PTT:33.8 sec    Lactate Trend            CAPILLARY BLOOD GLUCOSE      POCT Blood Glucose.: 114 mg/dL (02 Aug 2023 12:06)

## 2023-08-02 NOTE — H&P ADULT - NSHPREVIEWOFSYSTEMS_GEN_ALL_CORE
Review of Systems:     Constitutional: No weakness, fever, chills     Eyes: No blindness, no eye pain    ENT: No throat pain, no rhinorrhea     Neck: No pain or stiffness     Respiratory: +cough +wheezing +SOB    Cardiovascular: No chest pain, no palpitations +EPPS     Gastrointestinal: No abdominal or epigastric pain. No nausea, vomiting, or diarrhea     Genitourinary: No dysuria, no change in frequency, no hematuria     Neurological: No numbness or weakness      Skin: No itching, burning, rashes, or lesions     Psych: No depression or anxiety

## 2023-08-02 NOTE — PATIENT PROFILE ADULT - FALL HARM RISK - HARM RISK INTERVENTIONS

## 2023-08-02 NOTE — H&P ADULT - NSHPPHYSICALEXAM_GEN_ALL_CORE
Vital Signs Last 24 Hrs  T(C): 36.4 (02 Aug 2023 12:28), Max: 36.9 (02 Aug 2023 05:49)  T(F): 97.6 (02 Aug 2023 12:28), Max: 98.5 (02 Aug 2023 05:49)  HR: 98 (02 Aug 2023 12:28) (95 - 99)  BP: 122/62 (02 Aug 2023 12:28) (108/48 - 142/75)  BP(mean): 64 (02 Aug 2023 07:40) (64 - 64)  RR: 18 (02 Aug 2023 12:28) (18 - 20)  SpO2: 99% (02 Aug 2023 12:28) (95% - 100%)    Parameters below as of 02 Aug 2023 12:28  Patient On (Oxygen Delivery Method): room air    Physical Exam:     General: No acute distress, well-appearing    Eyes: PERRL, EOMI     ENT: MMM, no oropharyngeal lesions or erythema appreciated     Pulm: No increased WOB. No wheezing. 2L NC R basilar crackles.     CV: RRR. S1&S2+. No M/R/G appreciated.     Abdomen: +BS. Soft, NTND. No organomegaly.     MSK: Nml ROM    Extremities: No peripheral edema or cyanosis.     Neuro: A&Ox3, no focal deficits     Skin: Warm and dry. No visible rash.

## 2023-08-02 NOTE — H&P ADULT - PROBLEM SELECTOR PLAN 1
Patient with progressive EPPS x 1 month. Unclear etiology, whether it's uncontrolled asthma vs CHF vs pHTN vs 2/2 anemia. Physical exam unremarkable.    -CTA Chest no PE on presentation   -RVP negative   -VBG   -TTE   -Anemia workup as below Patient with progressive EPPS x 1 month. Unclear etiology, whether it's uncontrolled asthma vs CHF vs pHTN vs 2/2 anemia. Physical exam unremarkable.    -CTA Chest no PE on presentation   -RVP negative   -VBG   -TTE   -Anemia workup as below  -Unclear what patient is on diuretics for, c/w home spironolactone and bumex 1mg qday. Can increase bumex back to BID if Cr remains stable over next 1-2 days after IV contrast administration.

## 2023-08-02 NOTE — H&P ADULT - PROBLEM SELECTOR PLAN 6
Patient dx with asthma in 2018 in setting of a chronic cough.   -C/w home trelegy, montelukast  -Shannon q6   -Hypersal

## 2023-08-02 NOTE — H&P ADULT - PROBLEM SELECTOR PLAN 3
Baseline Cr 1.4-1.6. Cr 1.3 on admission. S/p 1L NS in ED.   -Monitor Cr closely in setting of IV contrast in ED   -Nephro consulted, recs appreciated   -Avoid nephrotoxic agents, renally dose meds   -C/w home cinacalcet   -C/w home spironolactone and decreased home bumex to 1mg QD for now, uptitrate as tolerated  -Monitor I/Os

## 2023-08-02 NOTE — H&P ADULT - PROBLEM SELECTOR PLAN 5
H/o bilateral PE's with e/o right heart strain in 3/2023. Believed to be 2/2 ?pAF. On Eliquis.   -CTA on admission with no PE

## 2023-08-02 NOTE — ED ADULT NURSE REASSESSMENT NOTE - NS ED NURSE REASSESS COMMENT FT1
RN handoff provided via telephone to DOUGLAS Longoria  Pt transport pending to the unit
Break RN note- Patient resting quietly in bed, breathing even and nonlabored. No acute distress. Patient denies any SOB at rest. Patient appears comfortable. Awaiting admission. Safety maintained. Patient stable upon exiting the room.

## 2023-08-02 NOTE — H&P ADULT - HISTORY OF PRESENT ILLNESS
78F with PMH of HTN, HLD, NIDDM, paroxysmal afib on Eliquis, h/o PE's (3/2023), DURAN on CPAP, ?asthma (dx 2018), and GERD presenting with progressive EPPS over past month. Patient states that she had progressive EPPS over the past several months and had an extensive pulmonary workup and was referred to a Cardiologist who diagnosed her with bilateral PE's with right heart strain in 3/2023. She was hospitalized and started on AC, subsequently her symptoms improved. However, over the past month, she's had recurrence of her EPPS, having to stop to catch her breath every few feet. She's also noted intermittent wheezing and cough productive of frothy white sputum. She said she was noted to be tachycardic to 143 and hypoxic to 83% during a recent 6-minute walk test at her pulmonologist's office. Additionally, she was found to have a 2 point drop in her hemoglobin recently, she states her Hgb was 10.2 on 6/29 and 8 on 7/27. Denies melena, hematochezia but states her Eliquis was decreased to 2.5mg BID after her drop in hemoglobin. Last colonoscopy was 2017, which showed 2 diverticula and she was told to get a repeat in 10 years if she still wanted to continue with screening colonoscopies. Denies fevers/chill, CP, palpitations, n/v/d, abdominal pain, uncontrolled reflux sx, LE edema, or orthopnea.     In the ED, HR 90's, /71, RR 20 (satting 100% on RA). Placed on 2L NC for comfort. Labs remarkable for H/H 8/27.8, MCV 76, BUN/Cr 134/1.73, AST/ALT 57/50, trop 16, proBNP 68. RVP negative. CTA Chest no PE. Pt give 1L NS and admitted for further workup.

## 2023-08-03 LAB
ALBUMIN SERPL ELPH-MCNC: 3.8 G/DL — SIGNIFICANT CHANGE UP (ref 3.3–5)
ALP SERPL-CCNC: 130 U/L — HIGH (ref 40–120)
ALT FLD-CCNC: 82 U/L — HIGH (ref 4–33)
ANION GAP SERPL CALC-SCNC: 11 MMOL/L — SIGNIFICANT CHANGE UP (ref 7–14)
AST SERPL-CCNC: 100 U/L — HIGH (ref 4–32)
BASE EXCESS BLDV CALC-SCNC: -0.5 MMOL/L — SIGNIFICANT CHANGE UP (ref -2–3)
BASOPHILS # BLD AUTO: 0.05 K/UL — SIGNIFICANT CHANGE UP (ref 0–0.2)
BASOPHILS NFR BLD AUTO: 0.9 % — SIGNIFICANT CHANGE UP (ref 0–2)
BILIRUB SERPL-MCNC: 0.3 MG/DL — SIGNIFICANT CHANGE UP (ref 0.2–1.2)
BLD GP AB SCN SERPL QL: NEGATIVE — SIGNIFICANT CHANGE UP
BUN SERPL-MCNC: 25 MG/DL — HIGH (ref 7–23)
CA-I SERPL-SCNC: 1.28 MMOL/L — SIGNIFICANT CHANGE UP (ref 1.15–1.33)
CALCIUM SERPL-MCNC: 9.4 MG/DL — SIGNIFICANT CHANGE UP (ref 8.4–10.5)
CHLORIDE BLDV-SCNC: 106 MMOL/L — SIGNIFICANT CHANGE UP (ref 96–108)
CHLORIDE SERPL-SCNC: 103 MMOL/L — SIGNIFICANT CHANGE UP (ref 98–107)
CO2 BLDV-SCNC: 26.8 MMOL/L — HIGH (ref 22–26)
CO2 SERPL-SCNC: 23 MMOL/L — SIGNIFICANT CHANGE UP (ref 22–31)
CREAT SERPL-MCNC: 1.57 MG/DL — HIGH (ref 0.5–1.3)
EGFR: 33 ML/MIN/1.73M2 — LOW
EOSINOPHIL # BLD AUTO: 0.32 K/UL — SIGNIFICANT CHANGE UP (ref 0–0.5)
EOSINOPHIL NFR BLD AUTO: 5.7 % — SIGNIFICANT CHANGE UP (ref 0–6)
FERRITIN SERPL-MCNC: 15 NG/ML — SIGNIFICANT CHANGE UP (ref 13–330)
GAS PNL BLDV: 138 MMOL/L — SIGNIFICANT CHANGE UP (ref 136–145)
GAS PNL BLDV: SIGNIFICANT CHANGE UP
GLUCOSE BLDC GLUCOMTR-MCNC: 131 MG/DL — HIGH (ref 70–99)
GLUCOSE BLDC GLUCOMTR-MCNC: 132 MG/DL — HIGH (ref 70–99)
GLUCOSE BLDC GLUCOMTR-MCNC: 140 MG/DL — HIGH (ref 70–99)
GLUCOSE BLDC GLUCOMTR-MCNC: 147 MG/DL — HIGH (ref 70–99)
GLUCOSE BLDV-MCNC: 120 MG/DL — HIGH (ref 70–99)
GLUCOSE SERPL-MCNC: 115 MG/DL — HIGH (ref 70–99)
HCO3 BLDV-SCNC: 25 MMOL/L — SIGNIFICANT CHANGE UP (ref 22–29)
HCT VFR BLD CALC: 26.7 % — LOW (ref 34.5–45)
HCT VFR BLDA CALC: 23 % — LOW (ref 34.5–46.5)
HGB BLD CALC-MCNC: 7.8 G/DL — LOW (ref 11.7–16.1)
HGB BLD-MCNC: 7.5 G/DL — LOW (ref 11.5–15.5)
IANC: 3.28 K/UL — SIGNIFICANT CHANGE UP (ref 1.8–7.4)
IMM GRANULOCYTES NFR BLD AUTO: 1.1 % — HIGH (ref 0–0.9)
IRON SATN MFR SERPL: 21 UG/DL — LOW (ref 30–160)
IRON SATN MFR SERPL: 5 % — LOW (ref 14–50)
LACTATE BLDV-MCNC: 1.2 MMOL/L — SIGNIFICANT CHANGE UP (ref 0.5–2)
LYMPHOCYTES # BLD AUTO: 1.23 K/UL — SIGNIFICANT CHANGE UP (ref 1–3.3)
LYMPHOCYTES # BLD AUTO: 21.8 % — SIGNIFICANT CHANGE UP (ref 13–44)
MAGNESIUM SERPL-MCNC: 2.2 MG/DL — SIGNIFICANT CHANGE UP (ref 1.6–2.6)
MCHC RBC-ENTMCNC: 21.4 PG — LOW (ref 27–34)
MCHC RBC-ENTMCNC: 28.1 GM/DL — LOW (ref 32–36)
MCV RBC AUTO: 76.1 FL — LOW (ref 80–100)
MONOCYTES # BLD AUTO: 0.71 K/UL — SIGNIFICANT CHANGE UP (ref 0–0.9)
MONOCYTES NFR BLD AUTO: 12.6 % — SIGNIFICANT CHANGE UP (ref 2–14)
NEUTROPHILS # BLD AUTO: 3.28 K/UL — SIGNIFICANT CHANGE UP (ref 1.8–7.4)
NEUTROPHILS NFR BLD AUTO: 57.9 % — SIGNIFICANT CHANGE UP (ref 43–77)
NRBC # BLD: 0 /100 WBCS — SIGNIFICANT CHANGE UP (ref 0–0)
NRBC # FLD: 0 K/UL — SIGNIFICANT CHANGE UP (ref 0–0)
OB PNL STL: NEGATIVE — SIGNIFICANT CHANGE UP
PCO2 BLDV: 47 MMHG — SIGNIFICANT CHANGE UP (ref 39–52)
PH BLDV: 7.34 — SIGNIFICANT CHANGE UP (ref 7.32–7.43)
PLATELET # BLD AUTO: 343 K/UL — SIGNIFICANT CHANGE UP (ref 150–400)
PO2 BLDV: 53 MMHG — HIGH (ref 25–45)
POTASSIUM BLDV-SCNC: 4.1 MMOL/L — SIGNIFICANT CHANGE UP (ref 3.5–5.1)
POTASSIUM SERPL-MCNC: 4.1 MMOL/L — SIGNIFICANT CHANGE UP (ref 3.5–5.3)
POTASSIUM SERPL-SCNC: 4.1 MMOL/L — SIGNIFICANT CHANGE UP (ref 3.5–5.3)
PROT SERPL-MCNC: 6.7 G/DL — SIGNIFICANT CHANGE UP (ref 6–8.3)
RBC # BLD: 3.51 M/UL — LOW (ref 3.8–5.2)
RBC # FLD: 19.1 % — HIGH (ref 10.3–14.5)
RH IG SCN BLD-IMP: POSITIVE — SIGNIFICANT CHANGE UP
SAO2 % BLDV: 85.5 % — SIGNIFICANT CHANGE UP (ref 67–88)
SODIUM SERPL-SCNC: 137 MMOL/L — SIGNIFICANT CHANGE UP (ref 135–145)
TIBC SERPL-MCNC: 416 UG/DL — SIGNIFICANT CHANGE UP (ref 220–430)
UIBC SERPL-MCNC: 395 UG/DL — HIGH (ref 110–370)
WBC # BLD: 5.65 K/UL — SIGNIFICANT CHANGE UP (ref 3.8–10.5)
WBC # FLD AUTO: 5.65 K/UL — SIGNIFICANT CHANGE UP (ref 3.8–10.5)

## 2023-08-03 PROCEDURE — 99233 SBSQ HOSP IP/OBS HIGH 50: CPT

## 2023-08-03 PROCEDURE — 93010 ELECTROCARDIOGRAM REPORT: CPT

## 2023-08-03 RX ORDER — IRON SUCROSE 20 MG/ML
200 INJECTION, SOLUTION INTRAVENOUS
Refills: 0 | Status: COMPLETED | OUTPATIENT
Start: 2023-08-03 | End: 2023-08-07

## 2023-08-03 RX ADMIN — APIXABAN 2.5 MILLIGRAM(S): 2.5 TABLET, FILM COATED ORAL at 05:58

## 2023-08-03 RX ADMIN — Medication 650 MILLIGRAM(S): at 09:38

## 2023-08-03 RX ADMIN — LATANOPROST 1 DROP(S): 0.05 SOLUTION/ DROPS OPHTHALMIC; TOPICAL at 21:13

## 2023-08-03 RX ADMIN — Medication 3 MILLILITER(S): at 14:53

## 2023-08-03 RX ADMIN — PANTOPRAZOLE SODIUM 40 MILLIGRAM(S): 20 TABLET, DELAYED RELEASE ORAL at 06:00

## 2023-08-03 RX ADMIN — CINACALCET 60 MILLIGRAM(S): 30 TABLET, FILM COATED ORAL at 13:01

## 2023-08-03 RX ADMIN — Medication 3 MILLILITER(S): at 10:56

## 2023-08-03 RX ADMIN — TIOTROPIUM BROMIDE 2 PUFF(S): 18 CAPSULE ORAL; RESPIRATORY (INHALATION) at 09:39

## 2023-08-03 RX ADMIN — SODIUM CHLORIDE 4 MILLILITER(S): 9 INJECTION INTRAMUSCULAR; INTRAVENOUS; SUBCUTANEOUS at 14:53

## 2023-08-03 RX ADMIN — Medication 650 MILLIGRAM(S): at 22:34

## 2023-08-03 RX ADMIN — MONTELUKAST 10 MILLIGRAM(S): 4 TABLET, CHEWABLE ORAL at 13:01

## 2023-08-03 RX ADMIN — Medication 650 MILLIGRAM(S): at 00:05

## 2023-08-03 RX ADMIN — SPIRONOLACTONE 25 MILLIGRAM(S): 25 TABLET, FILM COATED ORAL at 05:58

## 2023-08-03 RX ADMIN — Medication 650 MILLIGRAM(S): at 10:08

## 2023-08-03 RX ADMIN — APIXABAN 2.5 MILLIGRAM(S): 2.5 TABLET, FILM COATED ORAL at 19:03

## 2023-08-03 RX ADMIN — Medication 650 MILLIGRAM(S): at 23:04

## 2023-08-03 RX ADMIN — Medication 3 MILLILITER(S): at 03:50

## 2023-08-03 RX ADMIN — IRON SUCROSE 110 MILLIGRAM(S): 20 INJECTION, SOLUTION INTRAVENOUS at 15:57

## 2023-08-03 RX ADMIN — BUMETANIDE 1 MILLIGRAM(S): 0.25 INJECTION INTRAMUSCULAR; INTRAVENOUS at 05:58

## 2023-08-03 RX ADMIN — BUDESONIDE AND FORMOTEROL FUMARATE DIHYDRATE 2 PUFF(S): 160; 4.5 AEROSOL RESPIRATORY (INHALATION) at 09:38

## 2023-08-03 RX ADMIN — BUDESONIDE AND FORMOTEROL FUMARATE DIHYDRATE 2 PUFF(S): 160; 4.5 AEROSOL RESPIRATORY (INHALATION) at 22:33

## 2023-08-03 NOTE — CONSULT NOTE ADULT - ASSESSMENT
78F with PMH of HTN, HLD, NIDDM, paroxysmal afib on Eliquis, h/o PE's (3/2023), DURAN on CPAP, ?asthma (dx 2018), and GERD presenting with progressive EPPS over past month    EKG: NSR PVC no ischemic changes      1. SOB  -on exertion over past month  -trops 16  -EKG non ischemic   -CXR clear. CTA chest with No pulmonary embolism. No acute findings.  -euvolemic on exam, c/w PO bumex 1mg daily  -RVP negative   -TTE mild AS, normal LV [id number]     2. Elevated LFTs  -denies abd pain  -obtain liver sono  -t/t per primary team    3. DVT/PE  -diagnosed 3/2023  -on eliquis    4. Anemia  -denies overt bleeding  -obtain FOBT    5. Tachycardia  sustaining ST 120s on tele  -12lead EKG pending  -TSH WNL  -consider switching duoneb to Xopenex   -start metoprolol 25mg BID if continues to occur 
78y Female with history of HTN, DM presents with SOB. Nephrology consulted for elevated Scr.    1) CKD-3b: Without proteinuria for which patient follows with me as an outpatient. Baseline Scr 1.4-1.6. Scr near baseline. Monitor for MANASA given CT with IV contrast earlier today. Avoid nephrotoxins. Monitor electrolytes.    2) HTN with CKD: BP controlled. Can resume home medications.    3) LE edema: Patient appears euvolemic. Decrease bumex to 1 mg PO daily starting on 8/3 given CT with IV contrast earlier this morning. Continue with aldactone 25 mg PO daily. Prior TTE with normal LVSF. Monitor UO.     4) Anemia: Check AM iron stores. Will consider [place] pending results. Monitor Hb.    5) Primary HPT: Continue with sensipar 60 mg daily. Monitor serum calcium.       [facility] NEPHROLOG[facility]  Eric Acosta M.D.  Mitch Tatum D.O.  Paty Lama M.D.  Yanci Mirza, MSN, ANP-C    Telephone: [phone]  Facsimile: [phone]    [address] [place] [address]

## 2023-08-03 NOTE — CONSULT NOTE ADULT - SUBJECTIVE AND OBJECTIVE BOX
Tr Malcolm MD  Interventional Cardiology / Endovascular Specialist  [place] Office : [address] [place] [address] Tel:   [place] Office : [address] [place] [address] [address]  Tel: [phone]      HISTORY OF PRESENTING ILLNESS:  78F with PMH of HTN, HLD, NIDDM, paroxysmal afib on Eliquis, h/o PE's (3/2023), DURAN on CPAP, ?asthma (dx 2018), and GERD presenting with progressive EPPS over past month. Patient was diagnosed with PE and b/l DVTS while admitted in 3/2023. She was hospitalized and started on AC, subsequently her symptoms improved. However, over the past month, she's had recurrence of her EPPS, having to stop to catch her breath every few feet. She's also noted intermittent wheezing and cough productive of frothy white sputum. She said she was noted to be tachycardic to 143 and hypoxic to 83% during a recent 6-minute walk test at her pulmonologist's office. Additionally, she was found to have a 2 point drop in her hemoglobin recently, she states her Hgb was 10.2 on  and 8 on . Denies melena, hematochezia but states her Eliquis was decreased to 2.5mg BID after her drop in hemoglobin. Last colonoscopy was , which showed 2 diverticula and she was told to get a repeat in 10 years if she still wanted to continue with screening colonoscopies. Denies fevers/chill, CP, palpitations, n/v/d, abdominal pain, uncontrolled reflux sx, LE edema, or orthopnea. Troponin negative, EKG non ischemic. TTE with mild AS and normal LV function. Sustaining Sinus tach 120s on tele while sitting in bed. RVP negative. CTA chest negative for PE or acute pulmonary disease    	  MEDICATIONS:  apixaban 2.5 milliGRAM(s) Oral two times a day  buMETAnide 1 milliGRAM(s) Oral daily  spironolactone 25 milliGRAM(s) Oral daily      albuterol/ipratropium for Nebulization 3 milliLiter(s) Nebulizer every 6 hours  budesonide  80 MICROgram(s)/formoterol 4.5 MICROgram(s) Inhaler 2 Puff(s) Inhalation two times a day  montelukast 10 milliGRAM(s) Oral daily  sodium chloride 7% Inhalation 4 milliLiter(s) Inhalation every 12 hours  tiotropium 2.5 MICROgram(s) Inhaler 2 Puff(s) Inhalation daily    acetaminophen     Tablet .. 650 milliGRAM(s) Oral every 6 hours PRN    pantoprazole    Tablet 40 milliGRAM(s) Oral before breakfast    cinacalcet 60 milliGRAM(s) Oral daily  dextrose 50% Injectable 25 Gram(s) IV Push once  dextrose 50% Injectable 12.5 Gram(s) IV Push once  dextrose 50% Injectable 25 Gram(s) IV Push once  dextrose Oral Gel 15 Gram(s) Oral once PRN  glucagon  Injectable 1 milliGRAM(s) IntraMuscular once  insulin lispro (ADMELOG) corrective regimen sliding scale   SubCutaneous three times a day before meals  insulin lispro (ADMELOG) corrective regimen sliding scale   SubCutaneous at bedtime    dextrose 5%. 1000 milliLiter(s) IV Continuous <Continuous>  dextrose 5%. 1000 milliLiter(s) IV Continuous <Continuous>  iron sucrose IVPB 200 milliGRAM(s) IV Intermittent <User Schedule>  latanoprost 0.005% Ophthalmic Solution 1 Drop(s) Both EYES at bedtime      PAST MEDICAL/SURGICAL HISTORY  PAST MEDICAL & SURGICAL HISTORY:  HTN (hypertension)      Sleep apnea      Diabetes mellitus      Chronic GERD      HLD (hyperlipidemia)      S/P knee replacement  Right (  )      History of endometrial ablation  2002      S/P  section  1977      History of unilateral oophorectomy  1981          SOCIAL HISTORY: Substance Use (street drugs): ( x ) never used  (  ) other:    FAMILY HISTORY:      REVIEW OF SYSTEMS:  CONSTITUTIONAL: No fever, weight loss, or fatigue  EYES: No eye pain, visual disturbances, or discharge  ENMT:  No difficulty hearing, tinnitus, vertigo; No sinus or throat pain  BREASTS: No pain, masses, or nipple discharge  GASTROINTESTINAL: No abdominal or epigastric pain. No nausea, vomiting, or hematemesis; No diarrhea or constipation. No melena or hematochezia.  GENITOURINARY: No dysuria, frequency, hematuria, or incontinence  NEUROLOGICAL: No headaches, memory loss, loss of strength, numbness, or tremors  ENDOCRINE: No heat or cold intolerance; No hair loss  MUSCULOSKELETAL: No joint pain or swelling; No muscle, back, or extremity pain  PSYCHIATRIC: No depression, anxiety, mood swings, or difficulty sleeping  HEME/LYMPH: No easy bruising, or bleeding gums  All others negative    PHYSICAL EXAM:  T(C): 36.8 (23 @ 10:05), Max: 36.9 (23 @ 20:03)  HR: 100 (23 @ 14:57) (98 - 120)  BP: 131/61 (23 @ 10:05) (124/78 - 134/74)  RR: 18 (23 @ 10:05) (17 - 18)  SpO2: 92% (23 @ 11:05) (92% - 99%)  Wt(kg): --  I&O's Summary        GENERAL: NAD  EYES: EOMI, PERRLA, conjunctiva and sclera clear  ENMT: No tonsillar erythema, exudates, or enlargement  Cardiovascular: Normal S1 S2, No JVD, No murmurs, No edema  Respiratory: Lungs clear to auscultation	  Gastrointestinal:  Soft, Non-tender, + BS	  Extremities: No edema                                     7.5    5.65  )-----------( 343      ( 03 Aug 2023 06:26 )             26.7         137  |  103  |  25<H>  ----------------------------<  115<H>  4.1   |  23  |  1.57<H>    Ca    9.4      03 Aug 2023 06:26  Mg     2.20         TPro  6.7  /  Alb  3.8  /  TBili  0.3  /  DBili  x   /  AST  100<H>  /  ALT  82<H>  /  AlkPhos  130<H>      proBNP:   Lipid Profile:   HgA1c:   TSH:     Consultant(s) Notes Reviewed:  [x ] YES  [ ] NO    Care Discussed with Consultants/Other Providers [ x] YES  [ ] NO    Imaging Personally Reviewed independently:  [x] YES  [ ] NO    All labs, radiologic studies, vitals, orders and medications list reviewed. Patient is seen and examined at bedside. Case discussed with medical team.              
[facility] NEPHROLOGY- CONSULTATION NOTE    78y Female with history of below presents with SOB. Nephrology consulted for elevated Scr.    Patient well known to our practice as an outpatient for which she follows with me for h/o CKD-3b with baseline Scr 1.4-1.6. Patient recently seen where bumex had been increased to 1 mg PO daily with an additional dose MWF. Patient also with h/o primary HPT for which sensipar increased to 60 mg daily.     Patient admitted with SOB s/p CT-A with IV contrast which was negative.      REVIEW OF SYSTEMS:  Gen: no fevers  HEENT: no rhinorrhea  Neck: no sore throat  Cards: no chest pain  Resp: + dyspnea, + cough  GI: no nausea or vomiting or diarrhea  : no dysuria or hematuria  Vascular: no LE edema  Derm: no rashes  Neuro: no numbness/tingling    penicillin (Blisters)  codeine (Other)      Home Medications Reviewed  Hospital Medications:   MEDICATIONS  (STANDING):      PAST MEDICAL & SURGICAL HISTORY:  HTN (hypertension)      Sleep apnea      Diabetes mellitus      Chronic GERD      HLD (hyperlipidemia)      S/P knee replacement  Right (  )      History of endometrial ablation        S/P  section  1977      History of unilateral oophorectomy            FAMILY HISTORY:  N/C    SOCIAL HISTORY:  Denies toxic substance use     VITALS:  T(F): 98.1 (23 @ 07:40), Max: 98.5 (23 @ 05:49)  HR: 98 (23 @ 07:40)  BP: 108/48 (23 @ 07:40)  RR: 20 (23 @ 07:40)  SpO2: 95% (23 @ 07:40)  Wt(kg): --        PHYSICAL EXAM:  Gen: NAD, calm  HEENT: MMM  Neck: no JVD  Cards: RRR, +S1/S2, no M/G/R  Resp: CTA B/L  GI: soft, NT/ND, NABS  : no CVA tenderness  Vascular: no LE edema B/L  Derm: no rashes  Neuro: non-focal    LABS:      138  |  101  |  34<H>  ----------------------------<  120<H>  4.2   |  23  |  1.73<H>    Ca    10.0      01 Aug 2023 20:04    TPro  7.6  /  Alb  4.5  /  TBili  0.3  /  DBili      /  AST  57<H>  /  ALT  50<H>  /  AlkPhos  108      Creatinine Trend: 1.73 <--                        8.0    6.47  )-----------( 376      ( 01 Aug 2023 20:44 )             27.8     Urine Studies:  Urinalysis Basic - ( 01 Aug 2023 20:04 )    Color:  / Appearance:  / SG:  / pH:   Gluc: 120 mg/dL / Ketone:   / Bili:  / Urobili:    Blood:  / Protein:  / Nitrite:    Leuk Esterase:  / RBC:  / WBC    Sq Epi:  / Non Sq Epi:  / Bacteria:           RADIOLOGY & ADDITIONAL STUDIES:    < from: CT Angio Chest PE Protocol w/ IV Cont (23 @ 00:57) >  IMPRESSION:    No pulmonary embolism. No acute findings.    --- End of Report ---    < end of copied text >      < from: Xray Chest 2 Views PA/Lat (23 @ 20:33) >  IMPRESSION:  Clear lungs.    --- End of Report --    < end of copied text >

## 2023-08-03 NOTE — PROGRESS NOTE ADULT - SUBJECTIVE AND OBJECTIVE BOX
PULMONARY PROGRESS NOTE    TORI WOOTEN  MRN-[id number]    Patient is a 78y old  Female who presents with a chief complaint of SOB (02 Aug 2023 12:58)      HPI:  -1  -    ROS:   -    ACTIVE MEDICATION LIST:  MEDICATIONS  (STANDING):  albuterol/ipratropium for Nebulization 3 milliLiter(s) Nebulizer every 6 hours  apixaban 2.5 milliGRAM(s) Oral two times a day  budesonide  80 MICROgram(s)/formoterol 4.5 MICROgram(s) Inhaler 2 Puff(s) Inhalation two times a day  buMETAnide 1 milliGRAM(s) Oral daily  cinacalcet 60 milliGRAM(s) Oral daily  dextrose 5%. 1000 milliLiter(s) (50 mL/Hr) IV Continuous <Continuous>  dextrose 5%. 1000 milliLiter(s) (100 mL/Hr) IV Continuous <Continuous>  dextrose 50% Injectable 25 Gram(s) IV Push once  dextrose 50% Injectable 12.5 Gram(s) IV Push once  dextrose 50% Injectable 25 Gram(s) IV Push once  glucagon  Injectable 1 milliGRAM(s) IntraMuscular once  insulin lispro (ADMELOG) corrective regimen sliding scale   SubCutaneous three times a day before meals  insulin lispro (ADMELOG) corrective regimen sliding scale   SubCutaneous at bedtime  latanoprost 0.005% Ophthalmic Solution 1 Drop(s) Both EYES at bedtime  montelukast 10 milliGRAM(s) Oral daily  pantoprazole    Tablet 40 milliGRAM(s) Oral before breakfast  sodium chloride 7% Inhalation 4 milliLiter(s) Inhalation every 12 hours  spironolactone 25 milliGRAM(s) Oral daily  tiotropium 2.5 MICROgram(s) Inhaler 2 Puff(s) Inhalation daily    MEDICATIONS  (PRN):  acetaminophen     Tablet .. 650 milliGRAM(s) Oral every 6 hours PRN Temp greater or equal to 38C (100.4F), Mild Pain (1 - 3)  dextrose Oral Gel 15 Gram(s) Oral once PRN Blood Glucose LESS THAN 70 milliGRAM(s)/deciliter      EXAM:  Vital Signs Last 24 Hrs  T(C): 36.6 (03 Aug 2023 05:00), Max: 36.9 (02 Aug 2023 20:03)  T(F): 97.9 (03 Aug 2023 05:00), Max: 98.5 (02 Aug 2023 20:03)  HR: 99 (03 Aug 2023 05:00) (98 - 99)  BP: 124/78 (03 Aug 2023 05:00) (122/62 - 134/74)  BP(mean): --  RR: 18 (03 Aug 2023 05:00) (17 - 18)  SpO2: 96% (03 Aug 2023 05:00) (96% - 99%)    Parameters below as of 03 Aug 2023 05:00  Patient On (Oxygen Delivery Method): nasal cannula  O2 Flow (L/min): 2      GENERAL: The patient is awake and alert in no apparent distress.     LUNGS: Clear to auscultation without wheezing, rales or rhonchi; respirations unlabored    HEART: Regular rate and rhythm without murmur.                            7.5    5.65  )-----------( 343      ( 03 Aug 2023 06:26 )             26.7       08-03    137  |  103  |  25<H>  ----------------------------<  115<H>  4.1   |  23  |  1.57<H>    Ca    9.4      03 Aug 2023 06:26  Mg     2.20     08-03    TPro  6.7  /  Alb  3.8  /  TBili  0.3  /  DBili  x   /  AST  100<H>  /  ALT  82<H>  /  AlkPhos  130<H>  08-03    < from: CT Angio Chest PE Protocol w/ IV Cont (08.02.23 @ 00:57) >    ACC: [id number] EXAM:  CT ANGIO CHEST PULM [facility]   ORDERED BY: KEYSHAWN SILVA     PROCEDURE DATE:  08/02/2023          INTERPRETATION:  CLINICAL INFORMATION: Shortness of breath, evaluate for   pulmonary embolism. History of PE in the past.    COMPARISON: CTA chest 5/10/2023    CONTRAST/COMPLICATIONS:  IV Contrast: Omnipaque 350  44 cc administered   66 cc discarded  Oral Contrast: NONE  Complications: None reported at time of study completion    PROCEDURE:  CT Angiogram of the chest was obtained with intravenous contrast. Three   dimensional maximum intensity projection (MIP) images were generated.    FINDINGS:    PULMONARY ANGIOGRAM: No pulmonary embolism.    LYMPH NODES: No thoracic lymphadenopathy. Scattered calcified mediastinal   and hilar lymph nodes.    HEART/VASCULATURE: The heart is normal in size. No pericardial effusion.   Coronary artery calcifications.    AIRWAYS/LUNGS/PLEURA: No edema, pneumonia, pneumothorax or concerning   finding in the lungs.    UPPER ABDOMEN: Splenic calcifications. Left renal cyst.    BONES/SOFT TISSUES: Old left rib fractures. Degenerative changes of the   thoracic spine.    IMPRESSION:    No pulmonary embolism. No acute findings.    --- End of Report ---          ABHAY PIKE DO; Resident Radiologist  This document has been electronically signed.  MATT RAMOS MD; Attending Radiologist  This document has been electronically signed. Aug  2 2023  1:51AM    < end of copied text >  < from: Xray Chest 2 Views PA/Lat (08.01.23 @ 20:33) >    ACC: [id number] EXAM:  XR CHEST PA LAT 2V   ORDERED BY: KEYSHAWN SILVA     PROCEDURE DATE:  08/01/2023          INTERPRETATION:  CLINICAL INDICATION: SOB    TECHNIQUE: 2 views; Frontal and lateral views of the chest were obtained.    COMPARISON: Chest x-ray 3/17/2023.    FINDINGS:  The heart size is normal  The lungs are clear.  There is no pneumothorax or pleural effusion.  No acute osseous abnormality.    IMPRESSION:  Clear lungs.    --- End of Report ---          LAMINE VENTURA MD; Resident Radiologist  This document has been electronically signed.  DAE SHEPARD MD; Attending Radiologist  This document has been electronically signed. Aug  2 2023  8:25AM    < end of copied text >  >>> <<<    PROBLEM LIST:  78y Female with HEALTH ISSUES - PROBLEM Dx:  Dyspnea on exertion    Anemia    Stage 3 chronic kidney disease    Diabetes mellitus type II, non insulin dependent    HTN (hypertension)    Asthma    DURAN on CPAP    GERD (gastroesophageal reflux disease)    Paroxysmal atrial fibrillation    History of pulmonary embolism    Need for prophylactic measure    Suspected pulmonary embolism              RECS:        Please call with any questions.    Barb Collazo DO  [facility] Pulmonary/Sleep Medicine  [phone]   PULMONARY PROGRESS NOTE    TORI WOOTEN  MRN-[id number]    Patient is a 78y old  Female who presents with a chief complaint of SOB (02 Aug 2023 12:58)      HPI:  -79 yo female known to Dr Valenzuela:   PMH: HTN, HLD, NIDDM, paroxysmal afib on Eliquis, h/o PE's (3/2023), DURAN on CPAP, asthma (on trelegy), and GERD   saw Dr Valenzuela last week- dyspnea/ tachycardia  noted to have drop in h/h  eliquis dose tapered    still with dyspnea- admitted for further [facility] admission seen by nephro    feels better,but not at [facility]. has her own cpap machine at bedside  on 02 getting symbicort/spiriva        ROS:   -    ACTIVE MEDICATION LIST:  MEDICATIONS  (STANDING):  albuterol/ipratropium for Nebulization 3 milliLiter(s) Nebulizer every 6 hours  apixaban 2.5 milliGRAM(s) Oral two times a day  budesonide  80 MICROgram(s)/formoterol 4.5 MICROgram(s) Inhaler 2 Puff(s) Inhalation two times a day  buMETAnide 1 milliGRAM(s) Oral daily  cinacalcet 60 milliGRAM(s) Oral daily  dextrose 5%. 1000 milliLiter(s) (50 mL/Hr) IV Continuous <Continuous>  dextrose 5%. 1000 milliLiter(s) (100 mL/Hr) IV Continuous <Continuous>  dextrose 50% Injectable 25 Gram(s) IV Push once  dextrose 50% Injectable 12.5 Gram(s) IV Push once  dextrose 50% Injectable 25 Gram(s) IV Push once  glucagon  Injectable 1 milliGRAM(s) IntraMuscular once  insulin lispro (ADMELOG) corrective regimen sliding scale   SubCutaneous three times a day before meals  insulin lispro (ADMELOG) corrective regimen sliding scale   SubCutaneous at bedtime  latanoprost 0.005% Ophthalmic Solution 1 Drop(s) Both EYES at bedtime  montelukast 10 milliGRAM(s) Oral daily  pantoprazole    Tablet 40 milliGRAM(s) Oral before breakfast  sodium chloride 7% Inhalation 4 milliLiter(s) Inhalation every 12 hours  spironolactone 25 milliGRAM(s) Oral daily  tiotropium 2.5 MICROgram(s) Inhaler 2 Puff(s) Inhalation daily    MEDICATIONS  (PRN):  acetaminophen     Tablet .. 650 milliGRAM(s) Oral every 6 hours PRN Temp greater or equal to 38C (100.4F), Mild Pain (1 - 3)  dextrose Oral Gel 15 Gram(s) Oral once PRN Blood Glucose LESS THAN 70 milliGRAM(s)/deciliter      EXAM:  Vital Signs Last 24 Hrs  T(C): 36.6 (03 Aug 2023 05:00), Max: 36.9 (02 Aug 2023 20:03)  T(F): 97.9 (03 Aug 2023 05:00), Max: 98.5 (02 Aug 2023 20:03)  HR: 99 (03 Aug 2023 05:00) (98 - 99)  BP: 124/78 (03 Aug 2023 05:00) (122/62 - 134/74)  BP(mean): --  RR: 18 (03 Aug 2023 05:00) (17 - 18)  SpO2: 96% (03 Aug 2023 05:00) (96% - 99%)    Parameters below as of 03 Aug 2023 05:00  Patient On (Oxygen Delivery Method): nasal cannula  O2 Flow (L/min): 2      GENERAL: The patient is awake and alert in no apparent distress.     LUNGS: Clear to auscultation without wheezing, rales or rhonchi; respirations unlabored                           7.5    5.65  )-----------( 343      ( 03 Aug 2023 06:26 )             26.7       08-03    137  |  103  |  25<H>  ----------------------------<  115<H>  4.1   |  23  |  1.57<H>    Ca    9.4      03 Aug 2023 06:26  Mg     2.20     08-03    TPro  6.7  /  Alb  3.8  /  TBili  0.3  /  DBili  x   /  AST  100<H>  /  ALT  82<H>  /  AlkPhos  130<H>  08-03    < from: CT Angio Chest PE Protocol w/ IV Cont (08.02.23 @ 00:57) >    ACC: [id number] EXAM:  CT ANGIO CHEST PULM [facility]   ORDERED BY: KEYSHAWN SILVA     PROCEDURE DATE:  08/02/2023          INTERPRETATION:  CLINICAL INFORMATION: Shortness of breath, evaluate for   pulmonary embolism. History of PE in the past.    COMPARISON: CTA chest 5/10/2023    CONTRAST/COMPLICATIONS:  IV Contrast: Omnipaque 350  44 cc administered   66 cc discarded  Oral Contrast: NONE  Complications: None reported at time of study completion    PROCEDURE:  CT Angiogram of the chest was obtained with intravenous contrast. Three   dimensional maximum intensity projection (MIP) images were generated.    FINDINGS:    PULMONARY ANGIOGRAM: No pulmonary embolism.    LYMPH NODES: No thoracic lymphadenopathy. Scattered calcified mediastinal   and hilar lymph nodes.    HEART/VASCULATURE: The heart is normal in size. No pericardial effusion.   Coronary artery calcifications.    AIRWAYS/LUNGS/PLEURA: No edema, pneumonia, pneumothorax or concerning   finding in the lungs.    UPPER ABDOMEN: Splenic calcifications. Left renal cyst.    BONES/SOFT TISSUES: Old left rib fractures. Degenerative changes of the   thoracic spine.    IMPRESSION:    No pulmonary embolism. No acute findings.    --- End of Report ---          ABHAY PIKE DO; Resident Radiologist  This document has been electronically signed.  MATT RAMOS MD; Attending Radiologist  This document has been electronically signed. Aug  2 2023  1:51AM    < end of copied text >  < from: Xray Chest 2 Views PA/Lat (08.01.23 @ 20:33) >    ACC: [id number] EXAM:  XR CHEST PA LAT 2V   ORDERED BY: KEYSHAWN SILVA     PROCEDURE DATE:  08/01/2023          INTERPRETATION:  CLINICAL INDICATION: SOB    TECHNIQUE: 2 views; Frontal and lateral views of the chest were obtained.    COMPARISON: Chest x-ray 3/17/2023.    FINDINGS:  The heart size is normal  The lungs are clear.  There is no pneumothorax or pleural effusion.  No acute osseous abnormality.    IMPRESSION:  Clear lungs.    --- End of Report ---          LAMINE VENTURA MD; Resident Radiologist  This document has been electronically signed.  DAE SHEPARD MD; Attending Radiologist  This document has been electronically signed. Aug  2 2023  8:25AM    < end of copied text >  >>> <<<    PROBLEM LIST:  78y Female with HEALTH ISSUES - PROBLEM Dx:  Dyspnea on exertion    Anemia    Stage 3 chronic kidney disease    Diabetes mellitus type II, non insulin dependent    HTN (hypertension)    Asthma    DURAN on CPAP    GERD (gastroesophageal reflux disease)    Paroxysmal atrial fibrillation    History of pulmonary embolism    Need for prophylactic measure    Suspected pulmonary embolism              RECS:  symbicort/spiriva instead of trelegy  home cpap  GI eval      Please call with any questions.    Barb Collazo DO  [facility] Pulmonary/Sleep Medicine  [phone]

## 2023-08-03 NOTE — PROGRESS NOTE ADULT - SUBJECTIVE AND OBJECTIVE BOX
SUBJECTIVE / OVERNIGHT EVENTS:pt seen and examined  08-03-23     MEDICATIONS  (STANDING):  albuterol/ipratropium for Nebulization 3 milliLiter(s) Nebulizer every 6 hours  apixaban 2.5 milliGRAM(s) Oral two times a day  budesonide  80 MICROgram(s)/formoterol 4.5 MICROgram(s) Inhaler 2 Puff(s) Inhalation two times a day  buMETAnide 1 milliGRAM(s) Oral daily  cinacalcet 60 milliGRAM(s) Oral daily  dextrose 5%. 1000 milliLiter(s) (50 mL/Hr) IV Continuous <Continuous>  dextrose 5%. 1000 milliLiter(s) (100 mL/Hr) IV Continuous <Continuous>  dextrose 50% Injectable 25 Gram(s) IV Push once  dextrose 50% Injectable 12.5 Gram(s) IV Push once  dextrose 50% Injectable 25 Gram(s) IV Push once  glucagon  Injectable 1 milliGRAM(s) IntraMuscular once  insulin lispro (ADMELOG) corrective regimen sliding scale   SubCutaneous three times a day before meals  insulin lispro (ADMELOG) corrective regimen sliding scale   SubCutaneous at bedtime  iron sucrose IVPB 200 milliGRAM(s) IV Intermittent <User Schedule>  latanoprost 0.005% Ophthalmic Solution 1 Drop(s) Both EYES at bedtime  montelukast 10 milliGRAM(s) Oral daily  pantoprazole    Tablet 40 milliGRAM(s) Oral before breakfast  sodium chloride 7% Inhalation 4 milliLiter(s) Inhalation every 12 hours  spironolactone 25 milliGRAM(s) Oral daily  tiotropium 2.5 MICROgram(s) Inhaler 2 Puff(s) Inhalation daily    MEDICATIONS  (PRN):  acetaminophen     Tablet .. 650 milliGRAM(s) Oral every 6 hours PRN Temp greater or equal to 38C (100.4F), Mild Pain (1 - 3)  dextrose Oral Gel 15 Gram(s) Oral once PRN Blood Glucose LESS THAN 70 milliGRAM(s)/deciliter    T(C): 36.6 (08-03-23 @ 22:02), Max: 36.9 (08-03-23 @ 18:00)  HR: 104 (08-03-23 @ 22:02) (98 - 120)  BP: 103/54 (08-03-23 @ 22:02) (103/54 - 131/61)  RR: 18 (08-03-23 @ 22:02) (18 - 18)  SpO2: 95% (08-03-23 @ 22:02) (92% - 99%)    CAPILLARY BLOOD GLUCOSE      POCT Blood Glucose.: 132 mg/dL (03 Aug 2023 22:12)  POCT Blood Glucose.: 131 mg/dL (03 Aug 2023 17:12)  POCT Blood Glucose.: 140 mg/dL (03 Aug 2023 12:49)  POCT Blood Glucose.: 147 mg/dL (03 Aug 2023 08:24)    I&O's Summary      Constitutional: No fever, fatigue  Skin: No rash.  Eyes: No recent vision problems or eye pain.  ENT: No congestion, ear pain, or sore throat.  Cardiovascular: No chest pain or palpation.  Respiratory: No cough, shortness of breath, congestion, or wheezing.  Gastrointestinal: No abdominal pain, nausea, vomiting, or diarrhea.  Genitourinary: No dysuria.  Musculoskeletal: No joint swelling.  Neurologic: No headache.    PHYSICAL EXAM:  GENERAL: NAD  EYES: EOMI, PERRLA  NECK: Supple, No JVD  CHEST/LUNG: dec breath sounds at bases   HEART:  S1 , S2 +  ABDOMEN: soft , bs+  EXTREMITIES:  edema+  NEUROLOGY:alert awake      LABS:                        7.5    5.65  )-----------( 343      ( 03 Aug 2023 06:26 )             26.7     08-03    137  |  103  |  25<H>  ----------------------------<  115<H>  4.1   |  23  |  1.57<H>    Ca    9.4      03 Aug 2023 06:26  Mg     2.20     08-03    TPro  6.7  /  Alb  3.8  /  TBili  0.3  /  DBili  x   /  AST  100<H>  /  ALT  82<H>  /  AlkPhos  130<H>  08-03          Urinalysis Basic - ( 03 Aug 2023 06:26 )    Color: x / Appearance: x / SG: x / pH: x  Gluc: 115 mg/dL / Ketone: x  / Bili: x / Urobili: x   Blood: x / Protein: x / Nitrite: x   Leuk Esterase: x / RBC: x / WBC x   Sq Epi: x / Non Sq Epi: x / Bacteria: x        RADIOLOGY & ADDITIONAL TESTS:    Imaging Personally Reviewed:    Consultant(s) Notes Reviewed:      Care Discussed with Consultants/Other Providers:

## 2023-08-03 NOTE — PROGRESS NOTE ADULT - SUBJECTIVE AND OBJECTIVE BOX
[facility] NEPHROLOGY- PROGRESS NOTE    78y Female with history of HTN, DM presents with SOB. Nephrology consulted for elevated Scr.    REVIEW OF SYSTEMS:  Gen: no fevers  Cards: no chest pain  Resp: + dyspnea, + cough  GI: no nausea or vomiting or diarrhea  Vascular: no LE edema    penicillin (Blisters)  codeine (Other)      Hospital Medications: Medications reviewed    VITALS:  T(F): 98.3 (08-03-23 @ 10:05), Max: 98.5 (08-02-23 @ 20:03)  HR: 105 (08-03-23 @ 11:05)  BP: 131/61 (08-03-23 @ 10:05)  RR: 18 (08-03-23 @ 10:05)  SpO2: 92% (08-03-23 @ 11:05)  Wt(kg): --        PHYSICAL EXAM:    Gen: NAD, calm  Cards: RRR, +S1/S2, no M/G/R  Resp: CTA B/L  GI: soft, NT/ND, NABS  Vascular: trace LE edema B/L    LABS:  08-03    137  |  103  |  25<H>  ----------------------------<  115<H>  4.1   |  23  |  1.57<H>    Ca    9.4      03 Aug 2023 06:26  Mg     2.20     08-03    TPro  6.7  /  Alb  3.8  /  TBili  0.3  /  DBili      /  AST  100<H>  /  ALT  82<H>  /  AlkPhos  130<H>  08-03    Creatinine Trend: 1.57 <--, 1.56 <--, 1.73 <--                        7.5    5.65  )-----------( 343      ( 03 Aug 2023 06:26 )             26.7     Urine Studies:  Urinalysis Basic - ( 03 Aug 2023 06:26 )    Color:  / Appearance:  / SG:  / pH:   Gluc: 115 mg/dL / Ketone:   / Bili:  / Urobili:    Blood:  / Protein:  / Nitrite:    Leuk Esterase:  / RBC:  / WBC    Sq Epi:  / Non Sq Epi:  / Bacteria:           RADIOLOGY & ADDITIONAL STUDIES:

## 2023-08-04 LAB
ANION GAP SERPL CALC-SCNC: 13 MMOL/L — SIGNIFICANT CHANGE UP (ref 7–14)
BUN SERPL-MCNC: 26 MG/DL — HIGH (ref 7–23)
CALCIUM SERPL-MCNC: 9.7 MG/DL — SIGNIFICANT CHANGE UP (ref 8.4–10.5)
CHLORIDE SERPL-SCNC: 102 MMOL/L — SIGNIFICANT CHANGE UP (ref 98–107)
CO2 SERPL-SCNC: 21 MMOL/L — LOW (ref 22–31)
CREAT SERPL-MCNC: 1.71 MG/DL — HIGH (ref 0.5–1.3)
EGFR: 30 ML/MIN/1.73M2 — LOW
GLUCOSE BLDC GLUCOMTR-MCNC: 135 MG/DL — HIGH (ref 70–99)
GLUCOSE BLDC GLUCOMTR-MCNC: 136 MG/DL — HIGH (ref 70–99)
GLUCOSE BLDC GLUCOMTR-MCNC: 144 MG/DL — HIGH (ref 70–99)
GLUCOSE BLDC GLUCOMTR-MCNC: 145 MG/DL — HIGH (ref 70–99)
GLUCOSE SERPL-MCNC: 122 MG/DL — HIGH (ref 70–99)
HCT VFR BLD CALC: 27.6 % — LOW (ref 34.5–45)
HGB BLD-MCNC: 8 G/DL — LOW (ref 11.5–15.5)
MCHC RBC-ENTMCNC: 21.6 PG — LOW (ref 27–34)
MCHC RBC-ENTMCNC: 29 GM/DL — LOW (ref 32–36)
MCV RBC AUTO: 74.6 FL — LOW (ref 80–100)
NRBC # BLD: 0 /100 WBCS — SIGNIFICANT CHANGE UP (ref 0–0)
NRBC # FLD: 0.02 K/UL — HIGH (ref 0–0)
PLATELET # BLD AUTO: 360 K/UL — SIGNIFICANT CHANGE UP (ref 150–400)
POTASSIUM SERPL-MCNC: 4.1 MMOL/L — SIGNIFICANT CHANGE UP (ref 3.5–5.3)
POTASSIUM SERPL-SCNC: 4.1 MMOL/L — SIGNIFICANT CHANGE UP (ref 3.5–5.3)
RBC # BLD: 3.7 M/UL — LOW (ref 3.8–5.2)
RBC # FLD: 19.3 % — HIGH (ref 10.3–14.5)
SODIUM SERPL-SCNC: 136 MMOL/L — SIGNIFICANT CHANGE UP (ref 135–145)
WBC # BLD: 7.07 K/UL — SIGNIFICANT CHANGE UP (ref 3.8–10.5)
WBC # FLD AUTO: 7.07 K/UL — SIGNIFICANT CHANGE UP (ref 3.8–10.5)

## 2023-08-04 PROCEDURE — 76705 ECHO EXAM OF ABDOMEN: CPT | Mod: 26

## 2023-08-04 PROCEDURE — 99232 SBSQ HOSP IP/OBS MODERATE 35: CPT

## 2023-08-04 RX ORDER — CALAMINE AND ZINC OXIDE AND PHENOL 160; 10 MG/ML; MG/ML
1 LOTION TOPICAL ONCE
Refills: 0 | Status: COMPLETED | OUTPATIENT
Start: 2023-08-04 | End: 2023-08-07

## 2023-08-04 RX ORDER — SODIUM CHLORIDE 9 MG/ML
4 INJECTION INTRAMUSCULAR; INTRAVENOUS; SUBCUTANEOUS EVERY 12 HOURS
Refills: 0 | Status: DISCONTINUED | OUTPATIENT
Start: 2023-08-04 | End: 2023-08-07

## 2023-08-04 RX ORDER — BUDESONIDE AND FORMOTEROL FUMARATE DIHYDRATE 160; 4.5 UG/1; UG/1
2 AEROSOL RESPIRATORY (INHALATION)
Refills: 0 | Status: DISCONTINUED | OUTPATIENT
Start: 2023-08-04 | End: 2023-08-07

## 2023-08-04 RX ORDER — BUMETANIDE 0.25 MG/ML
1 INJECTION INTRAMUSCULAR; INTRAVENOUS
Refills: 0 | Status: DISCONTINUED | OUTPATIENT
Start: 2023-08-04 | End: 2023-08-07

## 2023-08-04 RX ORDER — LEVALBUTEROL 1.25 MG/.5ML
0.63 SOLUTION, CONCENTRATE RESPIRATORY (INHALATION) EVERY 6 HOURS
Refills: 0 | Status: DISCONTINUED | OUTPATIENT
Start: 2023-08-04 | End: 2023-08-07

## 2023-08-04 RX ADMIN — Medication 650 MILLIGRAM(S): at 11:00

## 2023-08-04 RX ADMIN — SODIUM CHLORIDE 4 MILLILITER(S): 9 INJECTION INTRAMUSCULAR; INTRAVENOUS; SUBCUTANEOUS at 22:25

## 2023-08-04 RX ADMIN — APIXABAN 2.5 MILLIGRAM(S): 2.5 TABLET, FILM COATED ORAL at 18:06

## 2023-08-04 RX ADMIN — CINACALCET 60 MILLIGRAM(S): 30 TABLET, FILM COATED ORAL at 12:59

## 2023-08-04 RX ADMIN — Medication 650 MILLIGRAM(S): at 09:57

## 2023-08-04 RX ADMIN — Medication 3 MILLILITER(S): at 01:01

## 2023-08-04 RX ADMIN — IRON SUCROSE 110 MILLIGRAM(S): 20 INJECTION, SOLUTION INTRAVENOUS at 14:32

## 2023-08-04 RX ADMIN — LEVALBUTEROL 0.63 MILLIGRAM(S): 1.25 SOLUTION, CONCENTRATE RESPIRATORY (INHALATION) at 10:00

## 2023-08-04 RX ADMIN — Medication 650 MILLIGRAM(S): at 18:36

## 2023-08-04 RX ADMIN — BUDESONIDE AND FORMOTEROL FUMARATE DIHYDRATE 2 PUFF(S): 160; 4.5 AEROSOL RESPIRATORY (INHALATION) at 21:41

## 2023-08-04 RX ADMIN — PANTOPRAZOLE SODIUM 40 MILLIGRAM(S): 20 TABLET, DELAYED RELEASE ORAL at 06:14

## 2023-08-04 RX ADMIN — BUDESONIDE AND FORMOTEROL FUMARATE DIHYDRATE 2 PUFF(S): 160; 4.5 AEROSOL RESPIRATORY (INHALATION) at 09:58

## 2023-08-04 RX ADMIN — BUMETANIDE 1 MILLIGRAM(S): 0.25 INJECTION INTRAMUSCULAR; INTRAVENOUS at 06:12

## 2023-08-04 RX ADMIN — SPIRONOLACTONE 25 MILLIGRAM(S): 25 TABLET, FILM COATED ORAL at 06:13

## 2023-08-04 RX ADMIN — APIXABAN 2.5 MILLIGRAM(S): 2.5 TABLET, FILM COATED ORAL at 06:13

## 2023-08-04 RX ADMIN — MONTELUKAST 10 MILLIGRAM(S): 4 TABLET, CHEWABLE ORAL at 12:59

## 2023-08-04 RX ADMIN — SODIUM CHLORIDE 4 MILLILITER(S): 9 INJECTION INTRAMUSCULAR; INTRAVENOUS; SUBCUTANEOUS at 01:01

## 2023-08-04 RX ADMIN — LATANOPROST 1 DROP(S): 0.05 SOLUTION/ DROPS OPHTHALMIC; TOPICAL at 21:38

## 2023-08-04 RX ADMIN — TIOTROPIUM BROMIDE 2 PUFF(S): 18 CAPSULE ORAL; RESPIRATORY (INHALATION) at 09:57

## 2023-08-04 RX ADMIN — SODIUM CHLORIDE 4 MILLILITER(S): 9 INJECTION INTRAMUSCULAR; INTRAVENOUS; SUBCUTANEOUS at 10:00

## 2023-08-04 RX ADMIN — Medication 650 MILLIGRAM(S): at 18:06

## 2023-08-04 NOTE — PROGRESS NOTE ADULT - SUBJECTIVE AND OBJECTIVE BOX
[facility] NEPHROLOGY- PROGRESS NOTE    78y Female with history of HTN, DM presents with SOB. Nephrology consulted for elevated Scr.    REVIEW OF SYSTEMS:  Gen: no fevers  Cards: no chest pain  Resp: + dyspnea, + cough  GI: no nausea or vomiting or diarrhea  Vascular: no LE edema    penicillin (Blisters)  codeine (Other)      Hospital Medications: Medications reviewed      VITALS:  T(F): 98.3 (08-04-23 @ 05:00), Max: 98.4 (08-03-23 @ 18:00)  HR: 100 (08-04-23 @ 05:00)  BP: 108/60 (08-04-23 @ 05:00)  RR: 18 (08-04-23 @ 05:00)  SpO2: 98% (08-04-23 @ 05:00)  Wt(kg): --        PHYSICAL EXAM:    Gen: NAD, calm  Cards: RRR, +S1/S2, no M/G/R  Resp: CTA B/L  GI: soft, NT/ND, NABS  Vascular: trace LE edema B/L        LABS:  08-04    136  |  102  |  26<H>  ----------------------------<  122<H>  4.1   |  21<L>  |  1.71<H>    Ca    9.7      04 Aug 2023 06:32  Mg     2.20     08-03    TPro  6.7  /  Alb  3.8  /  TBili  0.3  /  DBili      /  AST  100<H>  /  ALT  82<H>  /  AlkPhos  130<H>  08-03    Creatinine Trend: 1.71 <--, 1.57 <--, 1.56 <--, 1.73 <--                        8.0    7.07  )-----------( 360      ( 04 Aug 2023 06:32 )             27.6     Urine Studies:  Urinalysis Basic - ( 04 Aug 2023 06:32 )    Color:  / Appearance:  / SG:  / pH:   Gluc: 122 mg/dL / Ketone:   / Bili:  / Urobili:    Blood:  / Protein:  / Nitrite:    Leuk Esterase:  / RBC:  / WBC    Sq Epi:  / Non Sq Epi:  / Bacteria:

## 2023-08-04 NOTE — PROGRESS NOTE ADULT - SUBJECTIVE AND OBJECTIVE BOX
PULMONARY PROGRESS NOTE    TORI WOOTEN  MRN-[id number]    Patient is a 78y old  Female who presents with a chief complaint of SOB (04 Aug 2023 13:18)      HPI:  -on room air  getting iron infusion  pruritic     ROS:   -    ACTIVE MEDICATION LIST:  MEDICATIONS  (STANDING):  apixaban 2.5 milliGRAM(s) Oral two times a day  budesonide  80 MICROgram(s)/formoterol 4.5 MICROgram(s) Inhaler 2 Puff(s) Inhalation two times a day  buMETAnide 1 milliGRAM(s) Oral daily  buMETAnide 1 milliGRAM(s) Oral <User Schedule>  calamine/zinc oxide Lotion 1 Application(s) Topical once  cinacalcet 60 milliGRAM(s) Oral daily  dextrose 5%. 1000 milliLiter(s) (50 mL/Hr) IV Continuous <Continuous>  dextrose 5%. 1000 milliLiter(s) (100 mL/Hr) IV Continuous <Continuous>  dextrose 50% Injectable 25 Gram(s) IV Push once  dextrose 50% Injectable 12.5 Gram(s) IV Push once  dextrose 50% Injectable 25 Gram(s) IV Push once  glucagon  Injectable 1 milliGRAM(s) IntraMuscular once  insulin lispro (ADMELOG) corrective regimen sliding scale   SubCutaneous three times a day before meals  insulin lispro (ADMELOG) corrective regimen sliding scale   SubCutaneous at bedtime  iron sucrose IVPB 200 milliGRAM(s) IV Intermittent <User Schedule>  latanoprost 0.005% Ophthalmic Solution 1 Drop(s) Both EYES at bedtime  montelukast 10 milliGRAM(s) Oral daily  pantoprazole    Tablet 40 milliGRAM(s) Oral before breakfast  sodium chloride 3%  Inhalation 4 milliLiter(s) Inhalation every 12 hours  spironolactone 25 milliGRAM(s) Oral daily  tiotropium 2.5 MICROgram(s) Inhaler 2 Puff(s) Inhalation daily    MEDICATIONS  (PRN):  acetaminophen     Tablet .. 650 milliGRAM(s) Oral every 6 hours PRN Temp greater or equal to 38C (100.4F), Mild Pain (1 - 3)  dextrose Oral Gel 15 Gram(s) Oral once PRN Blood Glucose LESS THAN 70 milliGRAM(s)/deciliter  levalbuterol Inhalation 0.63 milliGRAM(s) Inhalation every 6 hours PRN shortness of breath      EXAM:  Vital Signs Last 24 Hrs  T(C): 36.9 (04 Aug 2023 13:20), Max: 36.9 (03 Aug 2023 18:00)  T(F): 98.4 (04 Aug 2023 13:20), Max: 98.4 (03 Aug 2023 18:00)  HR: 108 (04 Aug 2023 13:20) (100 - 108)  BP: 135/77 (04 Aug 2023 13:20) (103/54 - 135/77)  BP(mean): --  RR: 18 (04 Aug 2023 13:20) (18 - 18)  SpO2: 98% (04 Aug 2023 13:20) (95% - 99%)    Parameters below as of 04 Aug 2023 13:20  Patient On (Oxygen Delivery Method): room air        GENERAL: The patient is awake and alert in no apparent distress.     LUNGS:  respirations unlabored            not wheezing               8.0    7.07  )-----------( 360      ( 04 Aug 2023 06:32 )             27.6       08-04    136  |  102  |  26<H>  ----------------------------<  122<H>  4.1   |  21<L>  |  1.71<H>    Ca    9.7      04 Aug 2023 06:32  Mg     2.20     08-03    TPro  6.7  /  Alb  3.8  /  TBili  0.3  /  DBili  x   /  AST  100<H>  /  ALT  82<H>  /  AlkPhos  130<H>  08-03  < from: CT Angio Chest PE Protocol w/ IV Cont (08.02.23 @ 00:57) >    ACC: [id number] EXAM:  CT ANGIO CHEST PULM [facility]   ORDERED BY: KEYSHAWN SILVA     PROCEDURE DATE:  08/02/2023          INTERPRETATION:  CLINICAL INFORMATION: Shortness of breath, evaluate for   pulmonary embolism. History of PE in the past.    COMPARISON: CTA chest 5/10/2023    CONTRAST/COMPLICATIONS:  IV Contrast: Omnipaque 350  44 cc administered   66 cc discarded  Oral Contrast: NONE  Complications: None reported at time of study completion    PROCEDURE:  CT Angiogram of the chest was obtained with intravenous contrast. Three   dimensional maximum intensity projection (MIP) images were generated.    FINDINGS:    PULMONARY ANGIOGRAM: No pulmonary embolism.    LYMPH NODES: No thoracic lymphadenopathy. Scattered calcified mediastinal   and hilar lymph nodes.    HEART/VASCULATURE: The heart is normal in size. No pericardial effusion.   Coronary artery calcifications.    AIRWAYS/LUNGS/PLEURA: No edema, pneumonia, pneumothorax or concerning   finding in the lungs.    UPPER ABDOMEN: Splenic calcifications. Left renal cyst.    BONES/SOFT TISSUES: Old left rib fractures. Degenerative changes of the   thoracic spine.    IMPRESSION:    No pulmonary embolism. No acute findings.    --- End of Report ---          ABHAY PIKE DO; Resident Radiologist  This document has been electronically signed.  MATT RAMOS MD; Attending Radiologist  This document has been electronically signed. Aug  2 2023  1:51AM    < end of copied text >       PROBLEM LIST:  78y Female with HEALTH ISSUES - PROBLEM Dx:  Dyspnea on exertion    Anemia    Stage 3 chronic kidney disease    Diabetes mellitus type II, non insulin dependent    HTN (hypertension)    Asthma    DRUAN on CPAP    GERD (gastroesophageal reflux disease)    Paroxysmal atrial fibrillation    History of pulmonary embolism    Need for prophylactic measure    Suspected pulmonary embolism          RECS:  please change symbicort 160   continue spiriva  on trelegy outpatient  has her home home cpap  on eliquis low dose for VTE  noted stool negative- trend cbc  renal/cardio input appreciated           Please call with any questions over the weekend   Barb Collazo DO  [facility] Pulmonary/Sleep Medicine  [phone]

## 2023-08-04 NOTE — PROGRESS NOTE ADULT - SUBJECTIVE AND OBJECTIVE BOX
SUBJECTIVE / OVERNIGHT EVENTS:pt seen and examined  08-04-23     MEDICATIONS  (STANDING):  apixaban 2.5 milliGRAM(s) Oral two times a day  budesonide 160 MICROgram(s)/formoterol 4.5 MICROgram(s) Inhaler 2 Puff(s) Inhalation two times a day  buMETAnide 1 milliGRAM(s) Oral <User Schedule>  buMETAnide 1 milliGRAM(s) Oral daily  calamine/zinc oxide Lotion 1 Application(s) Topical once  cinacalcet 60 milliGRAM(s) Oral daily  dextrose 5%. 1000 milliLiter(s) (50 mL/Hr) IV Continuous <Continuous>  dextrose 5%. 1000 milliLiter(s) (100 mL/Hr) IV Continuous <Continuous>  dextrose 50% Injectable 25 Gram(s) IV Push once  dextrose 50% Injectable 12.5 Gram(s) IV Push once  dextrose 50% Injectable 25 Gram(s) IV Push once  glucagon  Injectable 1 milliGRAM(s) IntraMuscular once  insulin lispro (ADMELOG) corrective regimen sliding scale   SubCutaneous three times a day before meals  insulin lispro (ADMELOG) corrective regimen sliding scale   SubCutaneous at bedtime  iron sucrose IVPB 200 milliGRAM(s) IV Intermittent <User Schedule>  latanoprost 0.005% Ophthalmic Solution 1 Drop(s) Both EYES at bedtime  montelukast 10 milliGRAM(s) Oral daily  pantoprazole    Tablet 40 milliGRAM(s) Oral before breakfast  sodium chloride 3%  Inhalation 4 milliLiter(s) Inhalation every 12 hours  spironolactone 25 milliGRAM(s) Oral daily  tiotropium 2.5 MICROgram(s) Inhaler 2 Puff(s) Inhalation daily    MEDICATIONS  (PRN):  acetaminophen     Tablet .. 650 milliGRAM(s) Oral every 6 hours PRN Temp greater or equal to 38C (100.4F), Mild Pain (1 - 3)  dextrose Oral Gel 15 Gram(s) Oral once PRN Blood Glucose LESS THAN 70 milliGRAM(s)/deciliter  levalbuterol Inhalation 0.63 milliGRAM(s) Inhalation every 6 hours PRN shortness of breath    Vital Signs Last 24 Hrs  T(C): 36.7 (08-04-23 @ 21:32), Max: 36.9 (08-04-23 @ 13:20)  T(F): 98 (08-04-23 @ 21:32), Max: 98.4 (08-04-23 @ 13:20)  HR: 104 (08-04-23 @ 22:25) (98 - 108)  BP: 129/56 (08-04-23 @ 21:32) (108/60 - 135/77)  BP(mean): --  RR: 17 (08-04-23 @ 21:32) (17 - 18)  SpO2: 100% (08-04-23 @ 22:25) (96% - 100%)      Constitutional: No fever, fatigue  Skin: No rash.  Eyes: No recent vision problems or eye pain.  ENT: No congestion, ear pain, or sore throat.  Cardiovascular: No chest pain or palpation.  Respiratory: No cough, shortness of breath, congestion, or wheezing.  Gastrointestinal: No abdominal pain, nausea, vomiting, or diarrhea.  Genitourinary: No dysuria.  Musculoskeletal: No joint swelling.  Neurologic: No headache.    PHYSICAL EXAM:  GENERAL: NAD  EYES: EOMI, PERRLA  NECK: Supple, No JVD  CHEST/LUNG: dec breath sounds at bases   HEART:  S1 , S2 +  ABDOMEN: soft , bs+  EXTREMITIES:  edema+  NEUROLOGY:alert awake      LABS:  08-04    136  |  102  |  26<H>  ----------------------------<  122<H>  4.1   |  21<L>  |  1.71<H>    Ca    9.7      04 Aug 2023 06:32  Mg     2.20     08-03    TPro  6.7  /  Alb  3.8  /  TBili  0.3  /  DBili      /  AST  100<H>  /  ALT  82<H>  /  AlkPhos  130<H>  08-03    Creatinine Trend: 1.71 <--, 1.57 <--, 1.56 <--, 1.73 <--                        8.0    7.07  )-----------( 360      ( 04 Aug 2023 06:32 )             27.6     Urine Studies:  Urinalysis Basic - ( 04 Aug 2023 06:32 )    Color:  / Appearance:  / SG:  / pH:   Gluc: 122 mg/dL / Ketone:   / Bili:  / Urobili:    Blood:  / Protein:  / Nitrite:    Leuk Esterase:  / RBC:  / WBC    Sq Epi:  / Non Sq Epi:  / Bacteria:               LIVER FUNCTIONS - ( 03 Aug 2023 06:26 )  Alb: 3.8 g/dL / Pro: 6.7 g/dL / ALK PHOS: 130 U/L / ALT: 82 U/L / AST: 100 U/L / GGT: x                 RADIOLOGY & ADDITIONAL TESTS:    Imaging Personally Reviewed:    Consultant(s) Notes Reviewed:      Care Discussed with Consultants/Other Providers:

## 2023-08-04 NOTE — PROGRESS NOTE ADULT - SUBJECTIVE AND OBJECTIVE BOX
Tr Malcolm MD  Interventional Cardiology / Endovascular Specialist  [place] Office : [address] [place] [address] [place] [address]  Tel:   [place] Office : [address] [facility] [place] [address]  Tel: [phone]    Pt lying in bed in NAD denies CP and SOB   	  MEDICATIONS:  apixaban 2.5 milliGRAM(s) Oral two times a day  buMETAnide 1 milliGRAM(s) Oral <User Schedule>  buMETAnide 1 milliGRAM(s) Oral daily  spironolactone 25 milliGRAM(s) Oral daily      budesonide 160 MICROgram(s)/formoterol 4.5 MICROgram(s) Inhaler 2 Puff(s) Inhalation two times a day  levalbuterol Inhalation 0.63 milliGRAM(s) Inhalation every 6 hours PRN  montelukast 10 milliGRAM(s) Oral daily  sodium chloride 3%  Inhalation 4 milliLiter(s) Inhalation every 12 hours  tiotropium 2.5 MICROgram(s) Inhaler 2 Puff(s) Inhalation daily    acetaminophen     Tablet .. 650 milliGRAM(s) Oral every 6 hours PRN    pantoprazole    Tablet 40 milliGRAM(s) Oral before breakfast    cinacalcet 60 milliGRAM(s) Oral daily  dextrose 50% Injectable 25 Gram(s) IV Push once  dextrose 50% Injectable 12.5 Gram(s) IV Push once  dextrose 50% Injectable 25 Gram(s) IV Push once  dextrose Oral Gel 15 Gram(s) Oral once PRN  glucagon  Injectable 1 milliGRAM(s) IntraMuscular once  insulin lispro (ADMELOG) corrective regimen sliding scale   SubCutaneous three times a day before meals  insulin lispro (ADMELOG) corrective regimen sliding scale   SubCutaneous at bedtime    calamine/zinc oxide Lotion 1 Application(s) Topical once  dextrose 5%. 1000 milliLiter(s) IV Continuous <Continuous>  dextrose 5%. 1000 milliLiter(s) IV Continuous <Continuous>  iron sucrose IVPB 200 milliGRAM(s) IV Intermittent <User Schedule>  latanoprost 0.005% Ophthalmic Solution 1 Drop(s) Both EYES at bedtime      PAST MEDICAL/SURGICAL HISTORY  PAST MEDICAL & SURGICAL HISTORY:  HTN (hypertension)      Sleep apnea      Diabetes mellitus      Chronic GERD      HLD (hyperlipidemia)      S/P knee replacement  Right (  )      History of endometrial ablation  2002      S/P  section  1977      History of unilateral oophorectomy            SOCIAL HISTORY: Substance Use (street drugs): ( x ) never used  (  ) other:    FAMILY HISTORY:      REVIEW OF SYSTEMS:  CONSTITUTIONAL: No fever, weight loss, or fatigue  EYES: No eye pain, visual disturbances, or discharge  ENMT:  No difficulty hearing, tinnitus, vertigo; No sinus or throat pain  BREASTS: No pain, masses, or nipple discharge  GASTROINTESTINAL: No abdominal or epigastric pain. No nausea, vomiting, or hematemesis; No diarrhea or constipation. No melena or hematochezia.  GENITOURINARY: No dysuria, frequency, hematuria, or incontinence  NEUROLOGICAL: No headaches, memory loss, loss of strength, numbness, or tremors  ENDOCRINE: No heat or cold intolerance; No hair loss  MUSCULOSKELETAL: No joint pain or swelling; No muscle, back, or extremity pain  PSYCHIATRIC: No depression, anxiety, mood swings, or difficulty sleeping  HEME/LYMPH: No easy bruising, or bleeding gums  All others negative    PHYSICAL EXAM:  T(C): 36.7 (23 @ 21:32), Max: 36.9 (23 @ 13:20)  HR: 104 (23 @ 22:25) (98 - 108)  BP: 129/56 (23 @ 21:32) (108/60 - 135/77)  RR: 17 (23 @ 21:32) (17 - 18)  SpO2: 100% (23 @ 22:25) (96% - 100%)  Wt(kg): --  I&O's Summary    Height (cm): 154.9 ( @ :20)  Weight (kg): 123.2 ( @ :20)  BMI (kg/m2): 51.3 ( @ :20)  BSA (m2): 2.15 ( @ :20)    GENERAL: NAD  EYES: EOMI, PERRLA, conjunctiva and sclera clear  ENMT: No tonsillar erythema, exudates, or enlargement  Cardiovascular: Normal S1 S2, No JVD, No murmurs, No edema  Respiratory: Lungs clear to auscultation	  Gastrointestinal:  Soft, Non-tender, + BS	  Extremities: No edema                                   8.0    7.07  )-----------( 360      ( 04 Aug 2023 06:32 )             27.6     08-    136  |  102  |  26<H>  ----------------------------<  122<H>  4.1   |  21<L>  |  1.71<H>    Ca    9.7      04 Aug 2023 06:32  Mg     2.20     -    TPro  6.7  /  Alb  3.8  /  TBili  0.3  /  DBili  x   /  AST  100<H>  /  ALT  82<H>  /  AlkPhos  130<H>      proBNP:   Lipid Profile:   HgA1c:   TSH:     Consultant(s) Notes Reviewed:  [x ] YES  [ ] NO    Care Discussed with Consultants/Other Providers [ x] YES  [ ] NO    Imaging Personally Reviewed independently:  [x] YES  [ ] NO    All labs, radiologic studies, vitals, orders and medications list reviewed. Patient is seen and examined at bedside. Case discussed with medical team.

## 2023-08-05 ENCOUNTER — TRANSCRIPTION ENCOUNTER (OUTPATIENT)
Age: 78
End: 2023-08-05

## 2023-08-05 LAB
ANION GAP SERPL CALC-SCNC: 10 MMOL/L — SIGNIFICANT CHANGE UP (ref 7–14)
BUN SERPL-MCNC: 27 MG/DL — HIGH (ref 7–23)
CALCIUM SERPL-MCNC: 9.8 MG/DL — SIGNIFICANT CHANGE UP (ref 8.4–10.5)
CHLORIDE SERPL-SCNC: 101 MMOL/L — SIGNIFICANT CHANGE UP (ref 98–107)
CO2 SERPL-SCNC: 25 MMOL/L — SIGNIFICANT CHANGE UP (ref 22–31)
CREAT SERPL-MCNC: 1.7 MG/DL — HIGH (ref 0.5–1.3)
EGFR: 30 ML/MIN/1.73M2 — LOW
GLUCOSE BLDC GLUCOMTR-MCNC: 140 MG/DL — HIGH (ref 70–99)
GLUCOSE BLDC GLUCOMTR-MCNC: 141 MG/DL — HIGH (ref 70–99)
GLUCOSE BLDC GLUCOMTR-MCNC: 146 MG/DL — HIGH (ref 70–99)
GLUCOSE BLDC GLUCOMTR-MCNC: 155 MG/DL — HIGH (ref 70–99)
GLUCOSE SERPL-MCNC: 109 MG/DL — HIGH (ref 70–99)
HCT VFR BLD CALC: 27.8 % — LOW (ref 34.5–45)
HGB BLD-MCNC: 8.1 G/DL — LOW (ref 11.5–15.5)
MAGNESIUM SERPL-MCNC: 2.1 MG/DL — SIGNIFICANT CHANGE UP (ref 1.6–2.6)
MCHC RBC-ENTMCNC: 22.2 PG — LOW (ref 27–34)
MCHC RBC-ENTMCNC: 29.1 GM/DL — LOW (ref 32–36)
MCV RBC AUTO: 76.2 FL — LOW (ref 80–100)
NRBC # BLD: 0 /100 WBCS — SIGNIFICANT CHANGE UP (ref 0–0)
NRBC # FLD: 0.04 K/UL — HIGH (ref 0–0)
PHOSPHATE SERPL-MCNC: 3.6 MG/DL — SIGNIFICANT CHANGE UP (ref 2.5–4.5)
PLATELET # BLD AUTO: 378 K/UL — SIGNIFICANT CHANGE UP (ref 150–400)
POTASSIUM SERPL-MCNC: 4.2 MMOL/L — SIGNIFICANT CHANGE UP (ref 3.5–5.3)
POTASSIUM SERPL-SCNC: 4.2 MMOL/L — SIGNIFICANT CHANGE UP (ref 3.5–5.3)
RBC # BLD: 3.65 M/UL — LOW (ref 3.8–5.2)
RBC # FLD: 19.8 % — HIGH (ref 10.3–14.5)
SODIUM SERPL-SCNC: 136 MMOL/L — SIGNIFICANT CHANGE UP (ref 135–145)
WBC # BLD: 8.91 K/UL — SIGNIFICANT CHANGE UP (ref 3.8–10.5)
WBC # FLD AUTO: 8.91 K/UL — SIGNIFICANT CHANGE UP (ref 3.8–10.5)

## 2023-08-05 PROCEDURE — 93010 ELECTROCARDIOGRAM REPORT: CPT

## 2023-08-05 RX ORDER — TRAMADOL HYDROCHLORIDE 50 MG/1
25 TABLET ORAL ONCE
Refills: 0 | Status: DISCONTINUED | OUTPATIENT
Start: 2023-08-05 | End: 2023-08-05

## 2023-08-05 RX ORDER — ACETAMINOPHEN 500 MG
1000 TABLET ORAL ONCE
Refills: 0 | Status: COMPLETED | OUTPATIENT
Start: 2023-08-05 | End: 2023-08-05

## 2023-08-05 RX ORDER — METOPROLOL TARTRATE 50 MG
25 TABLET ORAL
Refills: 0 | Status: DISCONTINUED | OUTPATIENT
Start: 2023-08-05 | End: 2023-08-07

## 2023-08-05 RX ADMIN — SPIRONOLACTONE 25 MILLIGRAM(S): 25 TABLET, FILM COATED ORAL at 05:58

## 2023-08-05 RX ADMIN — PANTOPRAZOLE SODIUM 40 MILLIGRAM(S): 20 TABLET, DELAYED RELEASE ORAL at 08:00

## 2023-08-05 RX ADMIN — TRAMADOL HYDROCHLORIDE 25 MILLIGRAM(S): 50 TABLET ORAL at 13:00

## 2023-08-05 RX ADMIN — MONTELUKAST 10 MILLIGRAM(S): 4 TABLET, CHEWABLE ORAL at 12:12

## 2023-08-05 RX ADMIN — Medication 1000 MILLIGRAM(S): at 06:24

## 2023-08-05 RX ADMIN — IRON SUCROSE 110 MILLIGRAM(S): 20 INJECTION, SOLUTION INTRAVENOUS at 14:29

## 2023-08-05 RX ADMIN — Medication 1: at 18:06

## 2023-08-05 RX ADMIN — BUMETANIDE 1 MILLIGRAM(S): 0.25 INJECTION INTRAMUSCULAR; INTRAVENOUS at 05:58

## 2023-08-05 RX ADMIN — BUDESONIDE AND FORMOTEROL FUMARATE DIHYDRATE 2 PUFF(S): 160; 4.5 AEROSOL RESPIRATORY (INHALATION) at 10:02

## 2023-08-05 RX ADMIN — TIOTROPIUM BROMIDE 2 PUFF(S): 18 CAPSULE ORAL; RESPIRATORY (INHALATION) at 10:02

## 2023-08-05 RX ADMIN — LATANOPROST 1 DROP(S): 0.05 SOLUTION/ DROPS OPHTHALMIC; TOPICAL at 23:58

## 2023-08-05 RX ADMIN — Medication 25 MILLIGRAM(S): at 18:05

## 2023-08-05 RX ADMIN — BUDESONIDE AND FORMOTEROL FUMARATE DIHYDRATE 2 PUFF(S): 160; 4.5 AEROSOL RESPIRATORY (INHALATION) at 21:56

## 2023-08-05 RX ADMIN — Medication 400 MILLIGRAM(S): at 05:54

## 2023-08-05 RX ADMIN — SODIUM CHLORIDE 4 MILLILITER(S): 9 INJECTION INTRAMUSCULAR; INTRAVENOUS; SUBCUTANEOUS at 22:07

## 2023-08-05 RX ADMIN — APIXABAN 2.5 MILLIGRAM(S): 2.5 TABLET, FILM COATED ORAL at 05:58

## 2023-08-05 RX ADMIN — APIXABAN 2.5 MILLIGRAM(S): 2.5 TABLET, FILM COATED ORAL at 18:05

## 2023-08-05 RX ADMIN — TRAMADOL HYDROCHLORIDE 25 MILLIGRAM(S): 50 TABLET ORAL at 12:12

## 2023-08-05 NOTE — DISCHARGE NOTE PROVIDER - HOSPITAL COURSE
78F with PMH of HTN, HLD, NIDDM, paroxysmal afib on Eliquis, h/o PE's (3/2023), DURAN on CPAP, ?asthma (dx 2018), and GERD presenting with progressive EPPS over past month. Admitted for workup of EPPS and anemia.     Dyspnea on exertion.   - Patient with progressive EPPS x 1 month. Unclear etiology, whether it's uncontrolled asthma vs CHF vs pHTN vs 2/2 anemia. Physical exam unremarkable.    - CTA Chest no PE on presentation   - Cardiology consulted patient, troponin negative, EKG nonischemic, CXR clear, RVP negative, TTE w/ mild AS and normal LV function, bumex 1mg PO QD    Anemia.   - Recently downtrending H/H. Unclear etiology. No s/s bleeding, possible slow bleed over past several months since starting eliquis.   - hb - 8.1  - no signs of active bleeding at present    Stage 3 chronic kidney disease.   - Baseline Cr 1.4-1.6. Cr 1.3 on admission. S/p 1L NS in ED.   - Monitored Cr closely in setting of IV contrast in ED   - Avoided nephrotoxic agents, renally dosed meds   - Nephrology consulted patient, follows  as outpatient, without proteinuria    Diabetes mellitus type II, non insulin dependent.   - On Metformin  - iss     History of pulmonary embolism.   - H/o bilateral PE's with e/o right heart strain in 3/2023. Believed to be 2/2 ?pAF. On Eliquis.   - CTA on admission with no PE.    Asthma.   - Patient dx with asthma in 2018 in setting of a chronic cough.   - C/w home trelegy, montelukast  - Duonebs q6   - Hypersal    Paroxysmal atrial fibrillation.   - C/w home eliquis. Not on rate control. SR on admission ECG.    HTN (hypertension).   - Hold home olmesartan in setting of mild elevation of Cr from baseline and IV contrast in ED   - f/u outpatient ACP about resuming medication     DURAN on CPAP   - C/w home CPAP    GERD (gastroesophageal reflux disease).   - c/w home PPI    Need for prophylactic measure.   - DVT ppx: Eliquis  - Diet: DASH/CC    On 8/7/23, case was discussed with , patient is medically cleared and optimized for discharge today. All medications were reviewed with attending, and sent to mutually agreed upon pharmacy. 78F with PMH of HTN, HLD, NIDDM, paroxysmal afib on Eliquis, h/o PE's (3/2023), DURAN on CPAP, ?asthma (dx 2018), and GERD presenting with progressive EPPS over past month. Admitted for workup of EPPS and anemia.     Dyspnea on exertion.   - Patient with progressive EPPS x 1 month. Unclear etiology, whether it's uncontrolled asthma vs CHF vs pHTN vs 2/2 anemia. Physical exam unremarkable.    - CTA Chest no PE on presentation   - Cardiology consulted patient, troponin negative, EKG nonischemic, CXR clear, RVP negative, TTE w/ mild AS and normal LV function, bumex 1mg PO QD    Anemia.   - Recently downtrending H/H. Unclear etiology. No s/s bleeding, possible slow bleed over past several months since starting eliquis.   - hb - 8.1  - no signs of active bleeding at present    Stage 3 chronic kidney disease.   - Baseline Cr 1.4-1.6. Cr 1.3 on admission. S/p 1L NS in ED.   - Monitored Cr closely in setting of IV contrast in ED   - Avoided nephrotoxic agents, renally dosed meds   - Nephrology consulted patient, follows  as outpatient, without proteinuria    Elevated LFTS:   - US: no acte pathology, hepatic steatosis     Diabetes mellitus type II, non insulin dependent.   - On Metformin  - iss     History of pulmonary embolism.   - H/o bilateral PE's with e/o right heart strain in 3/2023. Believed to be 2/2 ?pAF. On Eliquis.   - CTA on admission with no PE.    Asthma.   - Patient dx with asthma in 2018 in setting of a chronic cough.   - C/w home trelegy, montelukast  - Duonebs q6   - Hypersal    Paroxysmal atrial fibrillation.   - C/w home eliquis. Not on rate control. SR on admission ECG.    HTN (hypertension).   - Hold home olmesartan in setting of mild elevation of Cr from baseline and IV contrast in ED   - f/u outpatient ACP about resuming medication     DURAN on CPAP   - C/w home CPAP    GERD (gastroesophageal reflux disease).   - c/w home PPI    Need for prophylactic measure.   - DVT ppx: Eliquis  - Diet: DASH/CC    On 8/7/23, case was discussed with , patient is medically cleared and optimized for discharge today. All medications were reviewed with attending, and sent to mutually agreed upon pharmacy. 78F with PMH of HTN, HLD, NIDDM, paroxysmal afib on Eliquis, h/o PE's (3/2023), DURAN on CPAP, ?asthma (dx 2018), and GERD presenting with progressive EPPS over past month. Admitted for workup of EPPS and anemia.     Dyspnea on exertion.   - Patient with progressive EPPS x 1 month. Unclear etiology, whether it's uncontrolled asthma vs CHF vs pHTN vs 2/2 anemia. Physical exam unremarkable.    - CTA Chest no PE on presentation   - Cardiology consulted patient, troponin negative, EKG nonischemic, CXR clear, RVP negative, TTE w/ mild AS and normal LV function, bumex 1mg PO QD  - Patient to follow up with Edie Matos on 8/10/23 at 1:30PM.     Anemia.   - Recently downtrending H/H. Unclear etiology. No s/s bleeding, possible slow bleed over past several months since starting eliquis.   - hb - 8.1  - no signs of active bleeding at present    Stage 3 chronic kidney disease.   - Baseline Cr 1.4-1.6. Cr 1.3 on admission. S/p 1L NS in ED.   - Monitored Cr closely in setting of IV contrast in ED   - Avoided nephrotoxic agents, renally dosed meds   - Nephrology consulted patient, follows  as outpatient, without proteinuria    Elevated LFTS:   - US: no acte pathology, hepatic steatosis     Diabetes mellitus type II, non insulin dependent.   - On Metformin  - iss     History of pulmonary embolism.   - H/o bilateral PE's with e/o right heart strain in 3/2023. Believed to be 2/2 ?pAF. On Eliquis.   - CTA on admission with no PE.    Asthma.   - Patient dx with asthma in 2018 in setting of a chronic cough.   - C/w home trelegy, montelukast  - Duonebs q6   - Hypersal    Paroxysmal atrial fibrillation.   - C/w home eliquis. Not on rate control. SR on admission ECG.    HTN (hypertension).   - Hold home olmesartan in setting of mild elevation of Cr from baseline and IV contrast in ED   - f/u outpatient ACP about resuming medication     DURAN on CPAP   - C/w home CPAP    GERD (gastroesophageal reflux disease).   - c/w home PPI    Need for prophylactic measure.   - DVT ppx: Eliquis  - Diet: DASH/CC    On 8/7/23, case was discussed with , patient is medically cleared and optimized for discharge today. All medications were reviewed with attending, and sent to mutually agreed upon pharmacy.

## 2023-08-05 NOTE — DISCHARGE NOTE PROVIDER - NSDCMRMEDTOKEN_GEN_ALL_CORE_FT
apixaban 2.5 mg oral tablet: 1 tab(s) orally 2 times a day  bumetanide 1 mg oral tablet: 1 tab(s) orally 2 times a day on Mon, Weds &amp; Fri. 1 tablet once a day on other days.  cinacalcet 60 mg oral tablet: 1 tab(s) orally once a day  ergocalciferol 1.25 mg (50,000 intl units) oral capsule: 1 cap(s) orally once a week (on Thursday).  lansoprazole 30 mg oral delayed release capsule: 1 cap(s) orally once a day  Lumigan 0.01% ophthalmic solution: 1 drop(s) in each affected eye once a day (in the evening)  MetFORMIN (Eqv-Fortamet) 500 mg oral tablet, extended release: 2 tab(s) orally once a day  montelukast 10 mg oral tablet: 1 tab(s) orally once a day (in the evening)  olmesartan 20 mg oral tablet: 1 tab(s) orally once a day  spironolactone 25 mg oral tablet: 1 tab(s) orally once a day  Trelegy Ellipta 200 mcg-62.5 mcg-25 mcg/inh inhalation powder: 1 puff(s) inhaled once a day   apixaban 2.5 mg oral tablet: 1 tab(s) orally 2 times a day  budesonide-formoterol 160 mcg-4.5 mcg/inh inhalation aerosol: 2 puff(s) inhaled 2 times a day  bumetanide 1 mg oral tablet: 1 tab(s) orally once a day  cinacalcet 60 mg oral tablet: 1 tab(s) orally once a day  ergocalciferol 1.25 mg (50,000 intl units) oral capsule: 1 cap(s) orally once a week (on Thursday).  Lumigan 0.01% ophthalmic solution: 1 drop(s) in each affected eye once a day (in the evening)  MetFORMIN (Eqv-Fortamet) 500 mg oral tablet, extended release: 2 tab(s) orally once a day  metoprolol tartrate 25 mg oral tablet: 1 tab(s) orally 2 times a day  montelukast 10 mg oral tablet: 1 tab(s) orally once a day (in the evening)  pantoprazole 40 mg oral delayed release tablet: 1 tab(s) orally once a day (before a meal)  spironolactone 25 mg oral tablet: 1 tab(s) orally once a day  tiotropium 2.5 mcg/inh inhalation aerosol: 2 puff(s) inhaled once a day

## 2023-08-05 NOTE — PROGRESS NOTE ADULT - SUBJECTIVE AND OBJECTIVE BOX
Full note to follow. [facility] NEPHROLOGY- PROGRESS NOTE    78y Female with history of HTN, DM presents with SOB. Nephrology consulted for elevated Scr.    Pt with itching last night that she attributes to cinacalcet, particularly the higher dose of 60mg (though she has been on this for some time).  She was given calamine lotion overnight, but it did not help. She is currently not itchy. She reports that she thinks it started 1hr after taking cinacalcet, and may have been occurring at the lower dose too but ot a lesser degree.  She denies rash.  She does have SOB, but does not link this to cinacalcet and has known ongoing pulmonary issues.    REVIEW OF SYSTEMS:  Gen: no fevers  Cards: no chest pain  Resp: + dyspnea, + cough  GI: no nausea or vomiting or diarrhea  Vascular: no LE edema    penicillin (Blisters)  codeine (Other)      Hospital Medications: Medications reviewed      VITALS:  T(F): 97.4 (08-05-23 @ 13:00), Max: 98.2 (08-05-23 @ 05:54)  HR: 101 (08-05-23 @ 13:00)  BP: 125/72 (08-05-23 @ 13:00)  RR: 18 (08-05-23 @ 13:00)  SpO2: 92% (08-05-23 @ 13:00)      PHYSICAL EXAM:  Gen: NAD, calm  Cards: RRR, +S1/S2, no M/G/R  Resp: CTA B/L  GI: soft, NT/ND, NABS  Vascular: trace LE edema B/L        LABS:  08-05  136 <--, 136 <--, 137 <--, 139 <--, 138 <--  136  |  101  |  27<H>  ----------------------------<  109<H>  4.2   |  25  |  1.70<H>    Ca    9.8      05 Aug 2023 05:06  Phos  3.6     08-05  Mg     2.10     08-05      Creatinine Trend: 1.70 <--, 1.71 <--, 1.57 <--, 1.56 <--, 1.73 <--                        8.1    8.91  )-----------( 378      ( 05 Aug 2023 05:06 )             27.8     Urine Studies:  Urinalysis Basic - ( 05 Aug 2023 05:06 )    Color:  / Appearance:  / SG:  / pH:   Gluc: 109 mg/dL / Ketone:   / Bili:  / Urobili:    Blood:  / Protein:  / Nitrite:    Leuk Esterase:  / RBC:  / WBC    Sq Epi:  / Non Sq Epi:  / Bacteria:

## 2023-08-05 NOTE — DISCHARGE NOTE PROVIDER - NSFOLLOWUPCLINICS_GEN_ALL_ED_FT
[facility]  Nephrology  [address] [facility], [address]nd [place] [address]  Phone: [phone]  Fax:     [facility]  Hematology/Oncology  [address] [place] [address]  Phone: [phone]  Fax:

## 2023-08-05 NOTE — DISCHARGE NOTE PROVIDER - CARE PROVIDER_API CALL
Camden Winn [facility]  Internal Medicine  [address] [place] [address]  Phone: [phone]  Fax: [phone]  Follow Up Time:     Joselo Ramírez  Orthopaedic Surgery  [address] [facility] [facility] [address]  [place] [address]  Phone: [phone]  Fax: [phone]  Follow Up Time:     Eric Acosta  Nephrology  [address] [place] [address]  Phone: [phone]  Fax: [phone]  Follow Up Time:     Tr Malcolm  Interventional Cardiology  [address] [place] [address]  Phone: [phone]  Fax: [phone]  Follow Up Time:    Camden Winn [facility]  Internal Medicine  [address] [place] [address]  Phone: [phone]  Fax: [phone]  Follow Up Time:     Joselo Ramírez  Orthopaedic Surgery  [address] [facility], [address]  [place] [address]  Phone: [phone]  Fax: [phone]  Follow Up Time:     Eric Acosta  Nephrology  [address] [place] [address]  Phone: [phone]  Fax: [phone]  Follow Up Time:     Tr Malcolm  Interventional Cardiology  [address] [place] [address]  Phone: [phone]  Fax: [phone]  Follow Up Time:     Edie Palacio  Your Cardiologist  Phone: [phone]  Fax: (   )    -  Follow Up Time:

## 2023-08-05 NOTE — DISCHARGE NOTE PROVIDER - NSDCFUSCHEDAPPT_GEN_ALL_CORE_FT
Joselo Ramírez  [facility]  ORTHOSURG [address] [place]  Scheduled Appointment: 08/16/2023

## 2023-08-05 NOTE — PHYSICAL THERAPY INITIAL EVALUATION ADULT - ADDITIONAL COMMENTS
Pt lives in a home staircase to negotiate, resides with family, ambulates with a cane, no recent falls, compeltes ADls without assistance.

## 2023-08-05 NOTE — DISCHARGE NOTE PROVIDER - NSDCFUADDAPPT_GEN_ALL_CORE_FT
APPTS ARE READY TO BE MADE: YES    Best Family or Patient Contact (if needed):   1) Daughter   2) Self       PCP: Camden Winn      Continue medications as prescribed. Follow up with your primary care doctor, nephrologist, cardiologist, and orthopedic surgeon for further evaluation and management. Please call to make an appointment within 1-2 weeks of discharge.     APPTS ARE READY TO BE MADE: YES    Best Family or Patient Contact (if needed):   1) Daughter   2) Self       PCP: Camden Winn      Continue medications as prescribed. Follow up with your primary care doctor, nephrologist, cardiologist, and orthopedic surgeon for further evaluation and management. Please call to make an appointment within 1-2 weeks of discharge.     You have an appointment with Edie Matos ([phone]) at 1:30PM on Thursday 8/10/23.    APPTS ARE READY TO BE MADE: YES    Best Family or Patient Contact (if needed):   1) Daughter   2) Self       PCP: Camden Winn

## 2023-08-05 NOTE — PHYSICAL THERAPY INITIAL EVALUATION ADULT - PERTINENT HX OF CURRENT PROBLEM, REHAB EVAL
78F with PMH of HTN, HLD, NIDDM, paroxysmal afib on Eliquis, h/o PE's (3/2023), DURAN on CPAP, ?asthma (dx 2018), and GERD presenting with progressive EPPS over past month

## 2023-08-05 NOTE — DISCHARGE NOTE PROVIDER - CARE PROVIDERS DIRECT ADDRESSES
,DirectAddress_Unknown,[email or web address],DirectAddress_Unknown,DirectAddress_Unknown ,DirectAddress_Unknown,[email or web address],DirectAddress_Unknown,DirectAddress_Unknown,[email or web address]

## 2023-08-05 NOTE — PROGRESS NOTE ADULT - SUBJECTIVE AND OBJECTIVE BOX
SUBJECTIVE / OVERNIGHT EVENTS:pt seen and examined  08-05-23     MEDICATIONS  (STANDING):  apixaban 2.5 milliGRAM(s) Oral two times a day  budesonide 160 MICROgram(s)/formoterol 4.5 MICROgram(s) Inhaler 2 Puff(s) Inhalation two times a day  buMETAnide 1 milliGRAM(s) Oral <User Schedule>  buMETAnide 1 milliGRAM(s) Oral daily  calamine/zinc oxide Lotion 1 Application(s) Topical once  cinacalcet 60 milliGRAM(s) Oral daily  dextrose 5%. 1000 milliLiter(s) (50 mL/Hr) IV Continuous <Continuous>  dextrose 5%. 1000 milliLiter(s) (100 mL/Hr) IV Continuous <Continuous>  dextrose 50% Injectable 25 Gram(s) IV Push once  dextrose 50% Injectable 12.5 Gram(s) IV Push once  dextrose 50% Injectable 25 Gram(s) IV Push once  glucagon  Injectable 1 milliGRAM(s) IntraMuscular once  insulin lispro (ADMELOG) corrective regimen sliding scale   SubCutaneous three times a day before meals  insulin lispro (ADMELOG) corrective regimen sliding scale   SubCutaneous at bedtime  iron sucrose IVPB 200 milliGRAM(s) IV Intermittent <User Schedule>  latanoprost 0.005% Ophthalmic Solution 1 Drop(s) Both EYES at bedtime  metoprolol tartrate 25 milliGRAM(s) Oral two times a day  montelukast 10 milliGRAM(s) Oral daily  pantoprazole    Tablet 40 milliGRAM(s) Oral before breakfast  sodium chloride 3%  Inhalation 4 milliLiter(s) Inhalation every 12 hours  spironolactone 25 milliGRAM(s) Oral daily  tiotropium 2.5 MICROgram(s) Inhaler 2 Puff(s) Inhalation daily    MEDICATIONS  (PRN):  acetaminophen     Tablet .. 650 milliGRAM(s) Oral every 6 hours PRN Temp greater or equal to 38C (100.4F), Mild Pain (1 - 3)  dextrose Oral Gel 15 Gram(s) Oral once PRN Blood Glucose LESS THAN 70 milliGRAM(s)/deciliter  levalbuterol Inhalation 0.63 milliGRAM(s) Inhalation every 6 hours PRN shortness of breath    Vital Signs Last 24 Hrs  T(C): 36.3 (08-05-23 @ 13:00), Max: 36.8 (08-05-23 @ 05:54)  T(F): 97.4 (08-05-23 @ 13:00), Max: 98.2 (08-05-23 @ 05:54)  HR: 75 (08-05-23 @ 22:07) (75 - 101)  BP: 153/90 (08-05-23 @ 18:08) (123/56 - 153/90)  BP(mean): --  RR: 18 (08-05-23 @ 13:00) (18 - 18)  SpO2: 96% (08-05-23 @ 22:07) (92% - 97%)        Constitutional: No fever, fatigue  Skin: No rash.  Eyes: No recent vision problems or eye pain.  ENT: No congestion, ear pain, or sore throat.  Cardiovascular: No chest pain or palpation.  Respiratory: No cough, shortness of breath, congestion, or wheezing.  Gastrointestinal: No abdominal pain, nausea, vomiting, or diarrhea.  Genitourinary: No dysuria.  Musculoskeletal: No joint swelling.  Neurologic: No headache.    PHYSICAL EXAM:  GENERAL: NAD  EYES: EOMI, PERRLA  NECK: Supple, No JVD  CHEST/LUNG: dec breath sounds at bases   HEART:  S1 , S2 +  ABDOMEN: soft , bs+  EXTREMITIES:  edema+  NEUROLOGY:alert awake      LABS:  08-05    136  |  101  |  27<H>  ----------------------------<  109<H>  4.2   |  25  |  1.70<H>    Ca    9.8      05 Aug 2023 05:06  Phos  3.6     08-05  Mg     2.10     08-05      Creatinine Trend: 1.70 <--, 1.71 <--, 1.57 <--, 1.56 <--, 1.73 <--                        8.1    8.91  )-----------( 378      ( 05 Aug 2023 05:06 )             27.8     Urine Studies:  Urinalysis Basic - ( 05 Aug 2023 05:06 )    Color:  / Appearance:  / SG:  / pH:   Gluc: 109 mg/dL / Ketone:   / Bili:  / Urobili:    Blood:  / Protein:  / Nitrite:    Leuk Esterase:  / RBC:  / WBC    Sq Epi:  / Non Sq Epi:  / Bacteria:                           LIVER FUNCTIONS - ( 03 Aug 2023 06:26 )  Alb: 3.8 g/dL / Pro: 6.7 g/dL / ALK PHOS: 130 U/L / ALT: 82 U/L / AST: 100 U/L / GGT: x                 RADIOLOGY & ADDITIONAL TESTS:    Imaging Personally Reviewed:yes    Consultant(s) Notes Reviewed: yes     Care Discussed with Consultants/Other Providers:yes

## 2023-08-05 NOTE — DISCHARGE NOTE PROVIDER - NPI NUMBER (FOR [employer]YS[employer] USE ONLY) :
[[id number]],[[id number]] [[id number]],[[id number]],[[id number]],[[id number]]

## 2023-08-05 NOTE — DISCHARGE NOTE PROVIDER - NSDCCPCAREPLAN_GEN_ALL_CORE_FT
PRINCIPAL DISCHARGE DIAGNOSIS  Diagnosis: Shortness of breath  Assessment and Plan of Treatment: You came to the hospital for shortness of breathe. CAT scan of your chest was negative. You were seen by the cardiology team, your cardiac enzymes were negative, your EKG was negative, your chest xray was negative, your respiratory viral panel was negative, and an ultrasound of your heart showed normal pumping function. You were given the medication Bumex. Continue medications as prescribed. Follow up with your primary care doctor for further evaluation and management. Please call to make an appointment within 1-2 weeks of discharge.   Call your local emergency number (911 in the ) if:   •You have trouble breathing or shortness of breath at rest.  •You have chest pain or pressure that lasts longer than 5 minutes.  •You become confused or hard to wake.  •Your lips or face are blue.  Seek care immediately if:   •You have a fever of 104°F (40°C) or higher.      SECONDARY DISCHARGE DIAGNOSES  Diagnosis: Anemia  Assessment and Plan of Treatment: During your stay you were found to have a low blood lab level. You have no signs of symptoms of active bleeding. Follow-up with your outpatient provider for further care/recommendations. Monitor for signs/symptoms indicating worsening of disease, such as, easy bleeding/bruising, pale skin, fatigue, dizziness, increased heart rate, or chest pain.    Diagnosis: Stage 3 chronic kidney disease  Assessment and Plan of Treatment: You have a medical history of stage 3 chronic kidney disease. You were seen by the nephrology team (kidney doctors). In order to prevent further disease progression, continue to follow recommendations made by your primary provider/nephrologist and establish an outpatient follow up appointment within 2 weeks of discharge from the hospital. Continue a diet that is low in sodium and avoid foods that are concentrated in potassium and phosphorus. Continue your medications/supplementations as directed and avoid over-the-counter drugs that are harmful to kidneys, such as, Non-Steroidal Anti-Inflammatory Drugs (NSAIDs). Follow-up as outpatient to monitor your kidney function, as well as, vitamin D, Calcium, potassium, and phosphorus levels.    Diagnosis: Diabetes mellitus type II, non insulin dependent  Assessment and Plan of Treatment: Continue your medication regimen and a consistent carbohydrate diet (Meaning eating the same amount of carbohydrates at the same time each day). Monitor blood glucose levels throughout the day before meals and at bedtime. Record blood sugars and bring to outpatient providers appointment in order to be reviewed by your doctor for management modifications. If your sugars are more than 400 or less than 70 you should contact your PCP immediately. Monitor for signs/symptoms of low blood glucose, such as, dizziness, altered mental status, or cool/clammy skin. In addition, monitor for signs/symptoms of high blood glucose, such as, feeling hot, dry, fatigued, or with increased thirst/urination. Make regular podiatry appointments in order to have feet checked for wounds and uncontrolled toe nail growth to prevent infections, as well as, appointments with an ophthalmologist to monitor your vision.    Diagnosis: HTN (hypertension)  Assessment and Plan of Treatment: Continue current blood pressure medication regimen as directed. Monitor for any visual changes, headaches or dizziness.  Monitor blood pressure regularly.  Follow up with your PCP for further management for high blood pressure, please call to make appointment within 1 week of discharge    Diagnosis: History of pulmonary embolism  Assessment and Plan of Treatment: You have a history of blood clots in your lungs. CAT scan of your chest was negative for blood clots in your lungs. Continue medications as prescribed. Follow up with your primary care doctor and a hematologist/oncologist (blood doctor) for further evaluation and management. Please call to make an appointment within 1-2 weeks of discharge.    Diagnosis: Asthma  Assessment and Plan of Treatment: Continue your inhalers and annual pulmonary function tests with your outpatient provider. Monitor for asthma exacerbation, such as, shortness of breath, hyperventilation, or any other difficulties breathing and report to the emergency room in the event that your rescue inhaler has not resolved your symptoms.     PRINCIPAL DISCHARGE DIAGNOSIS  Diagnosis: Shortness of breath  Assessment and Plan of Treatment: You came to the hospital for shortness of breathe. CAT scan of your chest was negative. You were seen by the cardiology team, your cardiac enzymes were negative, your EKG was negative, your chest xray was negative, your respiratory viral panel was negative, and an ultrasound of your heart showed normal pumping function. You were given the medication Bumex. Continue medications as prescribed. Follow up with your primary care doctor for further evaluation and management. Please call to make an appointment within 1-2 weeks of discharge.   Call your local emergency number (911 in the ) if:   •You have trouble breathing or shortness of breath at rest.  •You have chest pain or pressure that lasts longer than 5 minutes.  •You become confused or hard to wake.  •Your lips or face are blue.  Seek care immediately if:   •You have a fever of 104°F (40°C) or higher.      SECONDARY DISCHARGE DIAGNOSES  Diagnosis: Anemia  Assessment and Plan of Treatment: During your stay you were found to have a low blood lab level. You have no signs of symptoms of active bleeding. Follow-up with your outpatient provider for further care/recommendations. Monitor for signs/symptoms indicating worsening of disease, such as, easy bleeding/bruising, pale skin, fatigue, dizziness, increased heart rate, or chest pain.    Diagnosis: Stage 3 chronic kidney disease  Assessment and Plan of Treatment: You have a medical history of stage 3 chronic kidney disease. You were seen by the nephrology team (kidney doctors). In order to prevent further disease progression, continue to follow recommendations made by your primary provider/nephrologist and establish an outpatient follow up appointment within 2 weeks of discharge from the hospital. Continue a diet that is low in sodium and avoid foods that are concentrated in potassium and phosphorus. Continue your medications/supplementations as directed and avoid over-the-counter drugs that are harmful to kidneys, such as, Non-Steroidal Anti-Inflammatory Drugs (NSAIDs). Follow-up as outpatient to monitor your kidney function, as well as, vitamin D, Calcium, potassium, and phosphorus levels.    Diagnosis: Diabetes mellitus type II, non insulin dependent  Assessment and Plan of Treatment: Continue your medication regimen and a consistent carbohydrate diet (Meaning eating the same amount of carbohydrates at the same time each day). Monitor blood glucose levels throughout the day before meals and at bedtime. Record blood sugars and bring to outpatient providers appointment in order to be reviewed by your doctor for management modifications. If your sugars are more than 400 or less than 70 you should contact your PCP immediately. Monitor for signs/symptoms of low blood glucose, such as, dizziness, altered mental status, or cool/clammy skin. In addition, monitor for signs/symptoms of high blood glucose, such as, feeling hot, dry, fatigued, or with increased thirst/urination. Make regular podiatry appointments in order to have feet checked for wounds and uncontrolled toe nail growth to prevent infections, as well as, appointments with an ophthalmologist to monitor your vision.    Diagnosis: HTN (hypertension)  Assessment and Plan of Treatment: Continue current blood pressure medication regimen as directed. Monitor for any visual changes, headaches or dizziness.  Monitor blood pressure regularly.  Follow up with your PCP for further management for high blood pressure, please call to make appointment within 1 week of discharge    Diagnosis: History of pulmonary embolism  Assessment and Plan of Treatment: You have a history of blood clots in your lungs. CAT scan of your chest was negative for blood clots in your lungs. Continue medications as prescribed. Follow up with your primary care doctor and a hematologist/oncologist (blood doctor) for further evaluation and management. Please call to make an appointment within 1-2 weeks of discharge.    Diagnosis: Asthma  Assessment and Plan of Treatment: Continue your inhalers and annual pulmonary function tests with your outpatient provider. Monitor for asthma exacerbation, such as, shortness of breath, hyperventilation, or any other difficulties breathing and report to the emergency room in the event that your rescue inhaler has not resolved your symptoms.    Diagnosis: Elevated LFTs  Assessment and Plan of Treatment: You were found to have elevated liver fuctioning tests. An ultrasound of your liver showed no acute pathology. Continue medications as prescribed. Follow up with your primary care doctor for further evaluation and management. Please call to make an appointment within 1-2 weeks of discharge.

## 2023-08-05 NOTE — DISCHARGE NOTE PROVIDER - PROVIDER TOKENS
PROVIDER:[TOKEN:[[id number]]],PROVIDER:[TOKEN:[[id number]]],PROVIDER:[TOKEN:[[id number]]],PROVIDER:[TOKEN:[[id number]]] PROVIDER:[TOKEN:[[id number]]],PROVIDER:[TOKEN:[[id number]]],PROVIDER:[TOKEN:[[id number]]],PROVIDER:[TOKEN:[[id number]]],FREE:[LAST:[Ameeta],FIRST:[Edie],PHONE:[phone]],FAX:[(   )    -],ADDRESS:[Your Cardiologist]]

## 2023-08-06 LAB
ANION GAP SERPL CALC-SCNC: 16 MMOL/L — HIGH (ref 7–14)
BUN SERPL-MCNC: 26 MG/DL — HIGH (ref 7–23)
CALCIUM SERPL-MCNC: 10.4 MG/DL — SIGNIFICANT CHANGE UP (ref 8.4–10.5)
CHLORIDE SERPL-SCNC: 101 MMOL/L — SIGNIFICANT CHANGE UP (ref 98–107)
CO2 SERPL-SCNC: 21 MMOL/L — LOW (ref 22–31)
CREAT SERPL-MCNC: 1.54 MG/DL — HIGH (ref 0.5–1.3)
EGFR: 34 ML/MIN/1.73M2 — LOW
GLUCOSE BLDC GLUCOMTR-MCNC: 124 MG/DL — HIGH (ref 70–99)
GLUCOSE BLDC GLUCOMTR-MCNC: 138 MG/DL — HIGH (ref 70–99)
GLUCOSE BLDC GLUCOMTR-MCNC: 163 MG/DL — HIGH (ref 70–99)
GLUCOSE BLDC GLUCOMTR-MCNC: 172 MG/DL — HIGH (ref 70–99)
GLUCOSE SERPL-MCNC: 108 MG/DL — HIGH (ref 70–99)
HCT VFR BLD CALC: 31.3 % — LOW (ref 34.5–45)
HGB BLD-MCNC: 8.8 G/DL — LOW (ref 11.5–15.5)
MAGNESIUM SERPL-MCNC: 2.1 MG/DL — SIGNIFICANT CHANGE UP (ref 1.6–2.6)
MCHC RBC-ENTMCNC: 22 PG — LOW (ref 27–34)
MCHC RBC-ENTMCNC: 28.1 GM/DL — LOW (ref 32–36)
MCV RBC AUTO: 78.3 FL — LOW (ref 80–100)
NRBC # BLD: 0 /100 WBCS — SIGNIFICANT CHANGE UP (ref 0–0)
NRBC # FLD: 0.06 K/UL — HIGH (ref 0–0)
PHOSPHATE SERPL-MCNC: 3.8 MG/DL — SIGNIFICANT CHANGE UP (ref 2.5–4.5)
PLATELET # BLD AUTO: 395 K/UL — SIGNIFICANT CHANGE UP (ref 150–400)
POTASSIUM SERPL-MCNC: 4 MMOL/L — SIGNIFICANT CHANGE UP (ref 3.5–5.3)
POTASSIUM SERPL-SCNC: 4 MMOL/L — SIGNIFICANT CHANGE UP (ref 3.5–5.3)
RBC # BLD: 4 M/UL — SIGNIFICANT CHANGE UP (ref 3.8–5.2)
RBC # FLD: 20.6 % — HIGH (ref 10.3–14.5)
SODIUM SERPL-SCNC: 138 MMOL/L — SIGNIFICANT CHANGE UP (ref 135–145)
WBC # BLD: 10.08 K/UL — SIGNIFICANT CHANGE UP (ref 3.8–10.5)
WBC # FLD AUTO: 10.08 K/UL — SIGNIFICANT CHANGE UP (ref 3.8–10.5)

## 2023-08-06 RX ORDER — ACETAMINOPHEN 500 MG
1000 TABLET ORAL ONCE
Refills: 0 | Status: COMPLETED | OUTPATIENT
Start: 2023-08-06 | End: 2023-08-06

## 2023-08-06 RX ADMIN — APIXABAN 2.5 MILLIGRAM(S): 2.5 TABLET, FILM COATED ORAL at 06:08

## 2023-08-06 RX ADMIN — Medication 25 MILLIGRAM(S): at 06:08

## 2023-08-06 RX ADMIN — PANTOPRAZOLE SODIUM 40 MILLIGRAM(S): 20 TABLET, DELAYED RELEASE ORAL at 06:08

## 2023-08-06 RX ADMIN — BUDESONIDE AND FORMOTEROL FUMARATE DIHYDRATE 2 PUFF(S): 160; 4.5 AEROSOL RESPIRATORY (INHALATION) at 10:52

## 2023-08-06 RX ADMIN — TIOTROPIUM BROMIDE 2 PUFF(S): 18 CAPSULE ORAL; RESPIRATORY (INHALATION) at 10:52

## 2023-08-06 RX ADMIN — LATANOPROST 1 DROP(S): 0.05 SOLUTION/ DROPS OPHTHALMIC; TOPICAL at 22:04

## 2023-08-06 RX ADMIN — BUMETANIDE 1 MILLIGRAM(S): 0.25 INJECTION INTRAMUSCULAR; INTRAVENOUS at 06:07

## 2023-08-06 RX ADMIN — IRON SUCROSE 110 MILLIGRAM(S): 20 INJECTION, SOLUTION INTRAVENOUS at 15:04

## 2023-08-06 RX ADMIN — Medication 400 MILLIGRAM(S): at 14:44

## 2023-08-06 RX ADMIN — MONTELUKAST 10 MILLIGRAM(S): 4 TABLET, CHEWABLE ORAL at 12:12

## 2023-08-06 RX ADMIN — SPIRONOLACTONE 25 MILLIGRAM(S): 25 TABLET, FILM COATED ORAL at 06:08

## 2023-08-06 RX ADMIN — SODIUM CHLORIDE 4 MILLILITER(S): 9 INJECTION INTRAMUSCULAR; INTRAVENOUS; SUBCUTANEOUS at 20:39

## 2023-08-06 RX ADMIN — Medication 1000 MILLIGRAM(S): at 15:17

## 2023-08-06 RX ADMIN — APIXABAN 2.5 MILLIGRAM(S): 2.5 TABLET, FILM COATED ORAL at 17:32

## 2023-08-06 RX ADMIN — CINACALCET 60 MILLIGRAM(S): 30 TABLET, FILM COATED ORAL at 12:12

## 2023-08-06 RX ADMIN — Medication 1: at 09:20

## 2023-08-06 RX ADMIN — Medication 25 MILLIGRAM(S): at 17:32

## 2023-08-06 NOTE — PROGRESS NOTE ADULT - PROBLEM SELECTOR PLAN 2
Recently downtrending H/H. Unclear etiology. No s/s bleeding, possible slow bleed over past several months since starting eliquis.   -FOBT   -Iron studies  -Monitor H/H daily   -Maintain active T&S   -Transfuse for Hgb <7  -Continue with eliquis for now due to recent history of PEs and Afib and no overt signs of bleeding.
Recently downtrending H/H. Unclear etiology. No s/s bleeding, possible slow bleed over past several months since starting eliquis.   -hb - 8.1  no signs of active bleeding at present
Recently downtrending H/H. Unclear etiology. No s/s bleeding, possible slow bleed over past several months since starting eliquis.   -FOBT   -Iron studies  -Monitor H/H daily   -Maintain active T&S   -Transfuse for Hgb <7  -Continue with eliquis for now due to recent history of PEs and Afib and no overt signs of bleeding.
Recently downtrending H/H. Unclear etiology. No s/s bleeding, possible slow bleed over past several months since starting eliquis.   -hb - 8.1  no signs of active bleeding at present

## 2023-08-06 NOTE — PROGRESS NOTE ADULT - PROBLEM SELECTOR PLAN 7
C/w home eliquis. Not on rate control. SR on admission ECG.

## 2023-08-06 NOTE — PROGRESS NOTE ADULT - SUBJECTIVE AND OBJECTIVE BOX
Tr Malcolm MD  Interventional Cardiology / Endovascular Specialist  [place] Office : [phone] [place] [address] [place] [address]  Tel:   [place] Office : [phone] [facility] [place] [address]  Tel: [phone]    Pt lying in bed in NAD denies CP and SOB   	  MEDICATIONS:  apixaban 2.5 milliGRAM(s) Oral two times a day  buMETAnide 1 milliGRAM(s) Oral <User Schedule>  buMETAnide 1 milliGRAM(s) Oral daily  metoprolol tartrate 25 milliGRAM(s) Oral two times a day  spironolactone 25 milliGRAM(s) Oral daily      budesonide 160 MICROgram(s)/formoterol 4.5 MICROgram(s) Inhaler 2 Puff(s) Inhalation two times a day  levalbuterol Inhalation 0.63 milliGRAM(s) Inhalation every 6 hours PRN  montelukast 10 milliGRAM(s) Oral daily  sodium chloride 3%  Inhalation 4 milliLiter(s) Inhalation every 12 hours  tiotropium 2.5 MICROgram(s) Inhaler 2 Puff(s) Inhalation daily    acetaminophen     Tablet .. 650 milliGRAM(s) Oral every 6 hours PRN    pantoprazole    Tablet 40 milliGRAM(s) Oral before breakfast    cinacalcet 60 milliGRAM(s) Oral daily  dextrose 50% Injectable 25 Gram(s) IV Push once  dextrose 50% Injectable 12.5 Gram(s) IV Push once  dextrose 50% Injectable 25 Gram(s) IV Push once  dextrose Oral Gel 15 Gram(s) Oral once PRN  glucagon  Injectable 1 milliGRAM(s) IntraMuscular once  insulin lispro (ADMELOG) corrective regimen sliding scale   SubCutaneous three times a day before meals  insulin lispro (ADMELOG) corrective regimen sliding scale   SubCutaneous at bedtime    calamine/zinc oxide Lotion 1 Application(s) Topical once  dextrose 5%. 1000 milliLiter(s) IV Continuous <Continuous>  dextrose 5%. 1000 milliLiter(s) IV Continuous <Continuous>  iron sucrose IVPB 200 milliGRAM(s) IV Intermittent <User Schedule>  latanoprost 0.005% Ophthalmic Solution 1 Drop(s) Both EYES at bedtime      PAST MEDICAL/SURGICAL HISTORY  PAST MEDICAL & SURGICAL HISTORY:  HTN (hypertension)      Sleep apnea      Diabetes mellitus      Chronic GERD      HLD (hyperlipidemia)      S/P knee replacement  Right ( 2012 )      History of endometrial ablation        S/P  section  1977      History of unilateral oophorectomy            SOCIAL HISTORY: Substance Use (street drugs): ( x ) never used  (  ) other:    FAMILY HISTORY:      REVIEW OF SYSTEMS:  CONSTITUTIONAL: No fever, weight loss, or fatigue  EYES: No eye pain, visual disturbances, or discharge  ENMT:  No difficulty hearing, tinnitus, vertigo; No sinus or throat pain  BREASTS: No pain, masses, or nipple discharge  GASTROINTESTINAL: No abdominal or epigastric pain. No nausea, vomiting, or hematemesis; No diarrhea or constipation. No melena or hematochezia.  GENITOURINARY: No dysuria, frequency, hematuria, or incontinence  NEUROLOGICAL: No headaches, memory loss, loss of strength, numbness, or tremors  ENDOCRINE: No heat or cold intolerance; No hair loss  MUSCULOSKELETAL: No joint pain or swelling; No muscle, back, or extremity pain  PSYCHIATRIC: No depression, anxiety, mood swings, or difficulty sleeping  HEME/LYMPH: No easy bruising, or bleeding gums  All others negative    PHYSICAL EXAM:  T(C): 36.8 (23 @ 09:25), Max: 36.8 (23 @ 09:25)  HR: 74 (23 @ 09:25) (74 - 101)  BP: 126/56 (23 @ 09:25) (122/56 - 153/90)  RR: 20 (23 @ 09:25) (18 - 20)  SpO2: 93% (23 @ 09:25) (92% - 97%)  Wt(kg): --  I&O's Summary    05 Aug 2023 07:01  -  06 Aug 2023 07:00  --------------------------------------------------------  IN: 250 mL / OUT: 0 mL / NET: 250 mL      GENERAL: NAD  EYES: conjunctiva and sclera clear  ENMT: No tonsillar erythema, exudates, or enlargement  Cardiovascular: Normal S1 S2, No JVD, No murmurs, No edema  Respiratory: Lungs clear to auscultation	  Gastrointestinal:  Soft, Non-tender, + BS	  Extremities: No edema                                 8.8    10.08 )-----------( 395      ( 06 Aug 2023 05:50 )             31.3     08-06    138  |  101  |  26<H>  ----------------------------<  108<H>  4.0   |  21<L>  |  1.54<H>    Ca    10.4      06 Aug 2023 05:50  Phos  3.8     08-06  Mg     2.10     -      proBNP:   Lipid Profile:   HgA1c:   TSH:     Consultant(s) Notes Reviewed:  [x ] YES  [ ] NO    Care Discussed with Consultants/Other Providers [ x] YES  [ ] NO    Imaging Personally Reviewed independently:  [x] YES  [ ] NO    All labs, radiologic studies, vitals, orders and medications list reviewed. Patient is seen and examined at bedside. Case discussed with medical team.

## 2023-08-06 NOTE — PROGRESS NOTE ADULT - PROBLEM SELECTOR PLAN 11
DVT ppx: Eliquis  Diet: DASH/CC

## 2023-08-06 NOTE — PROGRESS NOTE ADULT - PROBLEM SELECTOR PROBLEM 4
Diabetes mellitus type II, non insulin dependent

## 2023-08-06 NOTE — PROGRESS NOTE ADULT - PROBLEM SELECTOR PLAN 6
Thank you for allowing us to provide you with medical care today. We realize that you have many choices for your emergency care needs. We thank you for choosing [facility]. Please choose us in the future for any continued health care needs. We hope we addressed all of your medical concerns. We strive to provide excellent quality care in the Emergency Department. Anything less than excellent does not meet our expectations. The exam and treatment you received in the Emergency Department were for an emergent problem and are not intended as complete care. It is important that you follow up with a doctor, nurse practitioner, or physician's assistant for ongoing care. If your symptoms worsen or you do not improve as expected and you are unable to reach your usual health care provider, you should return to the Emergency Department. We are available 24 hours a day. Take this sheet with you when you go to your follow-up visit. If you have any problem arranging the follow-up visit, contact the Emergency Department immediately. Make an appointment your family doctor for follow up of this visit. Return to the ER if you are unable to be seen in a timely manner.
Patient dx with asthma in 2018 in setting of a chronic cough.   -C/w home trelegy, montelukast  -Shannon q6   -Hypersal

## 2023-08-06 NOTE — PROGRESS NOTE ADULT - SUBJECTIVE AND OBJECTIVE BOX
SUBJECTIVE / OVERNIGHT EVENTS:pt seen and examined  08-06- 23     MEDICATIONS  (STANDING):  apixaban 2.5 milliGRAM(s) Oral two times a day  budesonide 160 MICROgram(s)/formoterol 4.5 MICROgram(s) Inhaler 2 Puff(s) Inhalation two times a day  buMETAnide 1 milliGRAM(s) Oral <User Schedule>  buMETAnide 1 milliGRAM(s) Oral daily  calamine/zinc oxide Lotion 1 Application(s) Topical once  cinacalcet 60 milliGRAM(s) Oral daily  dextrose 5%. 1000 milliLiter(s) (50 mL/Hr) IV Continuous <Continuous>  dextrose 5%. 1000 milliLiter(s) (100 mL/Hr) IV Continuous <Continuous>  dextrose 50% Injectable 25 Gram(s) IV Push once  dextrose 50% Injectable 12.5 Gram(s) IV Push once  dextrose 50% Injectable 25 Gram(s) IV Push once  glucagon  Injectable 1 milliGRAM(s) IntraMuscular once  insulin lispro (ADMELOG) corrective regimen sliding scale   SubCutaneous three times a day before meals  insulin lispro (ADMELOG) corrective regimen sliding scale   SubCutaneous at bedtime  iron sucrose IVPB 200 milliGRAM(s) IV Intermittent <User Schedule>  latanoprost 0.005% Ophthalmic Solution 1 Drop(s) Both EYES at bedtime  metoprolol tartrate 25 milliGRAM(s) Oral two times a day  montelukast 10 milliGRAM(s) Oral daily  pantoprazole    Tablet 40 milliGRAM(s) Oral before breakfast  sodium chloride 3%  Inhalation 4 milliLiter(s) Inhalation every 12 hours  spironolactone 25 milliGRAM(s) Oral daily  tiotropium 2.5 MICROgram(s) Inhaler 2 Puff(s) Inhalation daily    MEDICATIONS  (PRN):  acetaminophen     Tablet .. 650 milliGRAM(s) Oral every 6 hours PRN Temp greater or equal to 38C (100.4F), Mild Pain (1 - 3)  dextrose Oral Gel 15 Gram(s) Oral once PRN Blood Glucose LESS THAN 70 milliGRAM(s)/deciliter  levalbuterol Inhalation 0.63 milliGRAM(s) Inhalation every 6 hours PRN shortness of breath    Vital Signs Last 24 Hrs  T(C): 36.5 (08-06-23 @ 20:15), Max: 36.8 (08-06-23 @ 09:25)  T(F): 97.7 (08-06-23 @ 20:15), Max: 98.3 (08-06-23 @ 09:25)  HR: 82 (08-06-23 @ 20:39) (68 - 92)  BP: 133/63 (08-06-23 @ 20:15) (122/56 - 154/59)  BP(mean): --  RR: 18 (08-06-23 @ 20:15) (18 - 20)  SpO2: 98% (08-06-23 @ 20:39) (93% - 100%)        Constitutional: No fever, fatigue  Skin: No rash.  Eyes: No recent vision problems or eye pain.  ENT: No congestion, ear pain, or sore throat.  Cardiovascular: No chest pain or palpation.  Respiratory: No cough, shortness of breath, congestion, or wheezing.  Gastrointestinal: No abdominal pain, nausea, vomiting, or diarrhea.  Genitourinary: No dysuria.  Musculoskeletal: No joint swelling.  Neurologic: No headache.    PHYSICAL EXAM:  GENERAL: NAD  EYES: EOMI, PERRLA  NECK: Supple, No JVD  CHEST/LUNG: dec breath sounds at bases   HEART:  S1 , S2 +  ABDOMEN: soft , bs+  EXTREMITIES:  edema+  NEUROLOGY:alert awake      LABS:  08-06    138  |  101  |  26<H>  ----------------------------<  108<H>  4.0   |  21<L>  |  1.54<H>    Ca    10.4      06 Aug 2023 05:50  Phos  3.8     08-06  Mg     2.10     08-06      Creatinine Trend: 1.54 <--, 1.70 <--, 1.71 <--, 1.57 <--, 1.56 <--, 1.73 <--                        8.8    10.08 )-----------( 395      ( 06 Aug 2023 05:50 )             31.3     Urine Studies:  Urinalysis Basic - ( 06 Aug 2023 05:50 )    Color:  / Appearance:  / SG:  / pH:   Gluc: 108 mg/dL / Ketone:   / Bili:  / Urobili:    Blood:  / Protein:  / Nitrite:    Leuk Esterase:  / RBC:  / WBC    Sq Epi:  / Non Sq Epi:  / Bacteria:                              RADIOLOGY & ADDITIONAL TESTS:    Imaging Personally Reviewed:yes    Consultant(s) Notes Reviewed: yes     Care Discussed with Consultants/Other Providers:yes

## 2023-08-06 NOTE — PROGRESS NOTE ADULT - PROBLEM SELECTOR PLAN 3
Baseline Cr 1.4-1.6. Cr 1.3 on admission. S/p 1L NS in ED.   -Monitor Cr closely in setting of IV contrast in ED   -renal f/u  -Avoid nephrotoxic agents, renally dose meds   -C/w home cinacalcet   -C/w home spironolactone and decreased home bumex to 1mg QD for now, uptitrate as tolerated  -Monitor I/Os
Baseline Cr 1.4-1.6. Cr 1.3 on admission. S/p 1L NS in ED.   -Monitor Cr closely in setting of IV contrast in ED   -renal f/u  -Avoid nephrotoxic agents, renally dose meds   -C/w home cinacalcet   -C/w home spironolactone and decreased home bumex to 1mg QD for now, uptitrate as tolerated  -Monitor I/Os
Baseline Cr 1.4-1.6. Cr 1.3 on admission. S/p 1L NS in ED.   -Monitor Cr closely in setting of IV contrast in ED   -renal f/u  -Avoid nephrotoxic agents, renally dose meds   -
Baseline Cr 1.4-1.6. Cr 1.3 on admission. S/p 1L NS in ED.   -Monitor Cr closely in setting of IV contrast in ED   -renal f/u  -Avoid nephrotoxic agents, renally dose meds   -

## 2023-08-06 NOTE — PROGRESS NOTE ADULT - SUBJECTIVE AND OBJECTIVE BOX
[facility] NEPHROLOGY- PROGRESS NOTE    78y Female with history of HTN, DM presents with SOB. Nephrology consulted for elevated Scr.    8/5: Pt with itching last night that she attributes to cinacalcet, particularly the higher dose of 60mg (though she has been on this for some time).  She was given calamine lotion overnight, but it did not help. She is currently not itchy. She reports that she thinks it started 1hr after taking cinacalcet, and may have been occurring at the lower dose too but ot a lesser degree.  She denies rash.  She does have SOB, but does not link this to cinacalcet and has known ongoing pulmonary issues.    8/6: Pt refused cinacalcet yesterday, but still had itching.  She now believes that cinacalcet is NOT related to her itching and has started taking it again.    REVIEW OF SYSTEMS:  Gen: no fevers  Cards: no chest pain  Resp: + dyspnea, + cough- improving  GI: no nausea or vomiting or diarrhea  Vascular: no LE edema    penicillin (Blisters)  codeine (Other)      Hospital Medications: Medications reviewed      VITALS:  T(F): 97.7 (08-06-23 @ 14:14), Max: 98.3 (08-06-23 @ 09:25)  HR: 68 (08-06-23 @ 14:14)  BP: 154/59 (08-06-23 @ 14:14)  RR: 20 (08-06-23 @ 14:14)  SpO2: 98% (08-06-23 @ 14:14)  Wt(kg): --    08-05 @ 07:01  -  08-06 @ 07:00  --------------------------------------------------------  IN: 250 mL / OUT: 0 mL / NET: 250 mL      PHYSICAL EXAM:  Gen: NAD, calm  Cards: RRR, +S1/S2, no M/G/R  Resp: CTA B/L  GI: soft, NT/ND, NABS  Vascular: trace LE edema B/L      LABS:  08-06    138  |  101  |  26<H>  ----------------------------<  108<H>  4.0   |  21<L>  |  1.54<H>    Ca    10.4      06 Aug 2023 05:50  Phos  3.8     08-06  Mg     2.10     08-06      Creatinine Trend: 1.54 <--, 1.70 <--, 1.71 <--, 1.57 <--, 1.56 <--, 1.73 <--                        8.8    10.08 )-----------( 395      ( 06 Aug 2023 05:50 )             31.3     Urine Studies:  Urinalysis Basic - ( 06 Aug 2023 05:50 )    Color:  / Appearance:  / SG:  / pH:   Gluc: 108 mg/dL / Ketone:   / Bili:  / Urobili:    Blood:  / Protein:  / Nitrite:    Leuk Esterase:  / RBC:  / WBC    Sq Epi:  / Non Sq Epi:  / Bacteria:

## 2023-08-06 NOTE — PROGRESS NOTE ADULT - PROBLEM SELECTOR PLAN 1
Patient with progressive EPPS x 1 month. Unclear etiology, whether it's uncontrolled asthma vs CHF vs pHTN vs 2/2 anemia. Physical exam unremarkable.    -CTA Chest no PE on presentation   -  -Anemia workup as below  -Unclear what patient is on diuretics for, c/w home spironolactone and bumex 1mg qday. Can increase bumex back to BID if Cr remains stable over next 1-2 days after IV contrast administration.
Patient with progressive EPPS x 1 month. Unclear etiology, whether it's uncontrolled asthma vs CHF vs pHTN vs 2/2 anemia. Physical exam unremarkable.    -CTA Chest no PE on presentation   -  -Anemia workup as below  -Unclear what patient is on diuretics for, c/w home spironolactone and bumex 1mg qday. Can increase bumex back to BID if Cr remains stable over next 1-2 days after IV contrast administration.
Patient with progressive EPPS x 1 month. Unclear etiology, whether it's uncontrolled asthma vs CHF vs pHTN vs 2/2 anemia. Physical exam unremarkable.    -CTA Chest no PE on presentation   -diuresis as per cards
Patient with progressive EPPS x 1 month. Unclear etiology, whether it's uncontrolled asthma vs CHF vs pHTN vs 2/2 anemia. Physical exam unremarkable.    -CTA Chest no PE on presentation   -diuresis as per cards

## 2023-08-07 ENCOUNTER — TRANSCRIPTION ENCOUNTER (OUTPATIENT)
Age: 78
End: 2023-08-07

## 2023-08-07 VITALS
RESPIRATION RATE: 18 BRPM | TEMPERATURE: 98 F | SYSTOLIC BLOOD PRESSURE: 137 MMHG | DIASTOLIC BLOOD PRESSURE: 78 MMHG | OXYGEN SATURATION: 96 % | HEART RATE: 91 BPM

## 2023-08-07 LAB
ANION GAP SERPL CALC-SCNC: 12 MMOL/L — SIGNIFICANT CHANGE UP (ref 7–14)
BUN SERPL-MCNC: 32 MG/DL — HIGH (ref 7–23)
CALCIUM SERPL-MCNC: 9.9 MG/DL — SIGNIFICANT CHANGE UP (ref 8.4–10.5)
CHLORIDE SERPL-SCNC: 100 MMOL/L — SIGNIFICANT CHANGE UP (ref 98–107)
CO2 SERPL-SCNC: 24 MMOL/L — SIGNIFICANT CHANGE UP (ref 22–31)
CREAT SERPL-MCNC: 1.78 MG/DL — HIGH (ref 0.5–1.3)
EGFR: 29 ML/MIN/1.73M2 — LOW
GLUCOSE BLDC GLUCOMTR-MCNC: 126 MG/DL — HIGH (ref 70–99)
GLUCOSE BLDC GLUCOMTR-MCNC: 140 MG/DL — HIGH (ref 70–99)
GLUCOSE SERPL-MCNC: 101 MG/DL — HIGH (ref 70–99)
HCT VFR BLD CALC: 30.6 % — LOW (ref 34.5–45)
HGB BLD-MCNC: 8.7 G/DL — LOW (ref 11.5–15.5)
MAGNESIUM SERPL-MCNC: 2.4 MG/DL — SIGNIFICANT CHANGE UP (ref 1.6–2.6)
MCHC RBC-ENTMCNC: 22.5 PG — LOW (ref 27–34)
MCHC RBC-ENTMCNC: 28.4 GM/DL — LOW (ref 32–36)
MCV RBC AUTO: 79.1 FL — LOW (ref 80–100)
NRBC # BLD: 1 /100 WBCS — HIGH (ref 0–0)
NRBC # FLD: 0.13 K/UL — HIGH (ref 0–0)
PHOSPHATE SERPL-MCNC: 3.6 MG/DL — SIGNIFICANT CHANGE UP (ref 2.5–4.5)
PLATELET # BLD AUTO: 392 K/UL — SIGNIFICANT CHANGE UP (ref 150–400)
POTASSIUM SERPL-MCNC: 4.3 MMOL/L — SIGNIFICANT CHANGE UP (ref 3.5–5.3)
POTASSIUM SERPL-SCNC: 4.3 MMOL/L — SIGNIFICANT CHANGE UP (ref 3.5–5.3)
RBC # BLD: 3.87 M/UL — SIGNIFICANT CHANGE UP (ref 3.8–5.2)
RBC # FLD: 21.7 % — HIGH (ref 10.3–14.5)
SODIUM SERPL-SCNC: 136 MMOL/L — SIGNIFICANT CHANGE UP (ref 135–145)
WBC # BLD: 10.41 K/UL — SIGNIFICANT CHANGE UP (ref 3.8–10.5)
WBC # FLD AUTO: 10.41 K/UL — SIGNIFICANT CHANGE UP (ref 3.8–10.5)

## 2023-08-07 RX ORDER — FLUTICASONE FUROATE, UMECLIDINIUM BROMIDE AND VILANTEROL TRIFENATATE 200; 62.5; 25 UG/1; UG/1; UG/1
1 POWDER RESPIRATORY (INHALATION)
Refills: 0 | DISCHARGE

## 2023-08-07 RX ORDER — METOPROLOL TARTRATE 50 MG
1 TABLET ORAL
Qty: 60 | Refills: 0
Start: 2023-08-07 | End: 2023-09-05

## 2023-08-07 RX ORDER — PANTOPRAZOLE SODIUM 20 MG/1
1 TABLET, DELAYED RELEASE ORAL
Qty: 30 | Refills: 0
Start: 2023-08-07 | End: 2023-09-05

## 2023-08-07 RX ORDER — BUMETANIDE 0.25 MG/ML
1 INJECTION INTRAMUSCULAR; INTRAVENOUS
Qty: 30 | Refills: 0
Start: 2023-08-07 | End: 2023-09-05

## 2023-08-07 RX ORDER — BUDESONIDE AND FORMOTEROL FUMARATE DIHYDRATE 160; 4.5 UG/1; UG/1
2 AEROSOL RESPIRATORY (INHALATION)
Qty: 1 | Refills: 0
Start: 2023-08-07 | End: 2023-09-05

## 2023-08-07 RX ORDER — BUMETANIDE 0.25 MG/ML
1 INJECTION INTRAMUSCULAR; INTRAVENOUS
Qty: 30 | Refills: 0 | DISCHARGE
Start: 2023-08-07 | End: 2023-09-05

## 2023-08-07 RX ORDER — OLMESARTAN MEDOXOMIL 5 MG/1
1 TABLET, FILM COATED ORAL
Refills: 0 | DISCHARGE

## 2023-08-07 RX ORDER — TIOTROPIUM BROMIDE 18 UG/1
2 CAPSULE ORAL; RESPIRATORY (INHALATION)
Qty: 1 | Refills: 0
Start: 2023-08-07 | End: 2023-09-05

## 2023-08-07 RX ORDER — LANSOPRAZOLE 15 MG/1
1 CAPSULE, DELAYED RELEASE ORAL
Refills: 0 | DISCHARGE

## 2023-08-07 RX ORDER — BUMETANIDE 0.25 MG/ML
1 INJECTION INTRAMUSCULAR; INTRAVENOUS
Refills: 0 | DISCHARGE

## 2023-08-07 RX ADMIN — Medication 650 MILLIGRAM(S): at 06:23

## 2023-08-07 RX ADMIN — CALAMINE AND ZINC OXIDE AND PHENOL 1 APPLICATION(S): 160; 10 LOTION TOPICAL at 09:46

## 2023-08-07 RX ADMIN — MONTELUKAST 10 MILLIGRAM(S): 4 TABLET, CHEWABLE ORAL at 16:16

## 2023-08-07 RX ADMIN — Medication 25 MILLIGRAM(S): at 06:19

## 2023-08-07 RX ADMIN — PANTOPRAZOLE SODIUM 40 MILLIGRAM(S): 20 TABLET, DELAYED RELEASE ORAL at 06:19

## 2023-08-07 RX ADMIN — BUDESONIDE AND FORMOTEROL FUMARATE DIHYDRATE 2 PUFF(S): 160; 4.5 AEROSOL RESPIRATORY (INHALATION) at 09:47

## 2023-08-07 RX ADMIN — CINACALCET 60 MILLIGRAM(S): 30 TABLET, FILM COATED ORAL at 16:16

## 2023-08-07 RX ADMIN — BUMETANIDE 1 MILLIGRAM(S): 0.25 INJECTION INTRAMUSCULAR; INTRAVENOUS at 06:19

## 2023-08-07 RX ADMIN — BUMETANIDE 1 MILLIGRAM(S): 0.25 INJECTION INTRAMUSCULAR; INTRAVENOUS at 16:16

## 2023-08-07 RX ADMIN — APIXABAN 2.5 MILLIGRAM(S): 2.5 TABLET, FILM COATED ORAL at 06:19

## 2023-08-07 RX ADMIN — TIOTROPIUM BROMIDE 2 PUFF(S): 18 CAPSULE ORAL; RESPIRATORY (INHALATION) at 09:47

## 2023-08-07 RX ADMIN — SPIRONOLACTONE 25 MILLIGRAM(S): 25 TABLET, FILM COATED ORAL at 06:19

## 2023-08-07 NOTE — DISCHARGE NOTE NURSING/CASE MANAGEMENT/SOCIAL WORK - NSDCPEFALRISK_GEN_ALL_CORE
For information on Fall & Injury Prevention, visit: [email or web address] OR  [email or web address] OR  https://www.cdc.gov/steadi/patient.html

## 2023-08-07 NOTE — DISCHARGE NOTE NURSING/CASE MANAGEMENT/SOCIAL WORK - NSDCFUADDAPPT_GEN_ALL_CORE_FT
Continue medications as prescribed. Follow up with your primary care doctor, nephrologist, cardiologist, and orthopedic surgeon for further evaluation and management. Please call to make an appointment within 1-2 weeks of discharge.     APPTS ARE READY TO BE MADE: YES    Best Family or Patient Contact (if needed):   1) Daughter   2) Self       PCP: Camden Winn

## 2023-08-07 NOTE — PROGRESS NOTE ADULT - ASSESSMENT
78y Female with history of HTN, DM presents with SOB. Nephrology consulted for elevated Scr.    1) CKD-3b: Without proteinuria for which patient follows with Dr. Tatum as an outpatient. Baseline Scr 1.4-1.6. Scr mildly increased this morning in setting of recent CT with IV contrast on 8/2 but remains near baseline. Avoid nephrotoxins. Monitor electrolytes.    2) HTN with CKD: BP controlled with tachycardia. Tachycardia as per primary team.    3) LE edema: Patient appears euvolemic. Continue with bumex 1 mg PO daily with an additional dose of 1 mg QHS MWF with aldactone 25 mg PO daily. Prior TTE with normal LVSF. Monitor UO.     4) Anemia: With iron deficiency. Continue with venofer 1 gram IV load. [place] once iron stores replete. Patient occult negative. Monitor Hb.    5) Primary HPT: Continue with sensipar 60 mg daily.  Monitor for allergy/reactions. Monitor serum calcium.       [facility] NEPHROLOGY  Eric Acosta M.D.  Mitch Tatum D.O.  Paty Lama M.D.  Yanci Mirza, CHARLI, ANP-C    Telephone: [phone]  Facsimile: [phone]    [address] [place] [address]  
78F with PMH of HTN, HLD, NIDDM, paroxysmal afib on Eliquis, h/o PE's (3/2023), DURAN on CPAP, ?asthma (dx 2018), and GERD presenting with progressive EPPS over past month    EKG: NSR PVC no ischemic changes      1. SOB  -on exertion over past month  -trops 16  -EKG non ischemic   -CXR clear. CTA chest with No pulmonary embolism. No acute findings.  -euvolemic on exam, c/w PO bumex 1mg daily  -RVP negative   -TTE mild AS, normal LV [id number]   -patient will follow up with her own OP cardiologist    2. Elevated LFTs  -denies abd pain  -t/t per primary team    3. DVT/PE  -diagnosed 3/2023  -on eliquis    4. Anemia  -denies overt bleeding  -FOBT negative  - getting IV Fe     5. Tachycardia  -TSH WNL  - improved     
78F with PMH of HTN, HLD, NIDDM, paroxysmal afib on Eliquis, h/o PE's (3/2023), DURAN on CPAP, ?asthma (dx 2018), and GERD presenting with progressive EPPS over past month    EKG: NSR PVC no ischemic changes      1. SOB  -on exertion over past month  -trops 16  -EKG non ischemic   -CXR clear. CTA chest with No pulmonary embolism. No acute findings.  -euvolemic on exam, c/w PO bumex 1mg daily  -RVP negative   -TTE mild AS, normal LV [id number]     2. Elevated LFTs  -denies abd pain  -t/t per primary team    3. DVT/PE  -diagnosed 3/2023  -on eliquis    4. Anemia  -denies overt bleeding  -obtain FOBT  - getting IV Fe     5. Tachycardia  sustaining ST 120s on tele  -12lead EKG pending  -TSH WNL  -consider switching duoneb to Xopenex   -start metoprolol 25mg BID if continues to occur 
78y Female with history of HTN, DM presents with SOB. Nephrology consulted for elevated Scr.    1) CKD-3b: Without proteinuria for which patient follows with me as an outpatient. Baseline Scr 1.4-1.6. Scr near baseline. Avoid nephrotoxins. Monitor electrolytes.    2) HTN with CKD: BP controlled. Holding home olmesartan but will resume if BP increases. Monitor BP.    3) LE edema: Patient appears euvolemic. Continue with bumex 1 mg PO daily with an additional dose of 1 mg QHS MWF with aldactone 25 mg PO daily. Prior TTE with normal LVSF. Monitor UO.     4) Anemia: With iron deficiency. Continue with venofer 1 gram IV load. [place] once iron stores replete. Patient occult negative. Monitor Hb.    5) Primary HPT: Continue with sensipar 60 mg daily. Monitor serum calcium.       [facility] NEPHROLOGY  Eric Acosta M.D.  Mitch Tatum D.O.  Paty Lama M.D.  Yanci Mirza, MSN, ANP-C    Telephone: [phone]  Facsimile: [phone]    [address] [place] [address]  
78F with PMH of HTN, HLD, NIDDM, paroxysmal afib on Eliquis, h/o PE's (3/2023), DURAN on CPAP, ?asthma (dx 2018), and GERD presenting with progressive EPPS over past month. Admitted for workup of EPPS and anemia. 
78F with PMH of HTN, HLD, NIDDM, paroxysmal afib on Eliquis, h/o PE's (3/2023), DURAN on CPAP, ?asthma (dx 2018), and GERD presenting with progressive EPPS over past month    EKG: NSR PVC no ischemic changes      1. SOB  -on exertion over past month  -trops 16  -EKG non ischemic   -CXR clear. CTA chest with No pulmonary embolism. No acute findings.  -euvolemic on exam, c/w PO bumex 1mg daily  -RVP negative   -TTE mild AS, normal LV [id number]     2. Elevated LFTs  -denies abd pain  -t/t per primary team    3. DVT/PE  -diagnosed 3/2023  -on eliquis    4. Anemia  -denies overt bleeding  -obtain FOBT  - getting IV Fe     5. Tachycardia  -TSH WNL  - improved 
78y Female with history of HTN, DM presents with SOB. Nephrology consulted for elevated Scr.    1) CKD-3b: Without proteinuria for which patient follows with Dr. Tatum as an outpatient. Baseline Scr 1.4-1.6. Scr mildly increased this morning in setting of recent CT with IV contrast on 8/2 but remains near baseline. Avoid nephrotoxins. Monitor electrolytes.    2) HTN with CKD: BP controlled with tachycardia. Tachycardia as per primary team.    3) LE edema: Patient appears euvolemic. Continue with bumex 1 mg PO daily with an additional dose of 1 mg QHS MWF with aldactone 25 mg PO daily. Prior TTE with normal LVSF. Monitor UO.     4) Anemia: With iron deficiency. Continue with venofer 1 gram IV load. [place] once iron stores replete. Patient occult negative. Monitor Hb.    5) Primary HPT: Continue with sensipar 60 mg daily for now.  Monitor for allergy/reactions. Monitor serum calcium.       [facility] NEPHROLOGY  Eric Acosta M.D.  Mitch Tatum D.O.  Paty Lama M.D.  Yanci Mirza, MSN, ANP-C    Telephone: [phone]  Facsimile: [phone]    [address] [place] [address]  
78y Female with history of HTN, DM presents with SOB. Nephrology consulted for elevated Scr.    1) CKD-3b: Without proteinuria for which patient follows with me as an outpatient. Baseline Scr 1.4-1.6. Scr @ baseline despite CT with IV contrast on 8/2. Avoid nephrotoxins. Monitor electrolytes.    2) HTN with CKD: BP controlled with tachycardia. Tachycardia as per primary team.    3) LE edema: Patient appears euvolemic. Continue with bumex 1 mg PO daily with aldactone 25 mg PO daily. Prior TTE with normal LVSF. Monitor UO.     4) Anemia: With iron deficiency suspect due to GIB. Check stool occult. Start venofer 1 gram IV load. [place] once iron stores replete. Monitor Hb.    5) Primary HPT: Continue with sensipar 60 mg daily. Monitor serum calcium.       [facility] NEPHROLOGY  Eric Acosta M.D.  Mitch Tatum D.O.  Paty Lama M.D.  Yanci Mirza, MSN, ANP-C    Telephone: [phone]  Facsimile: [phone]    [address] [place] [address]  
78y Female with history of HTN, DM presents with SOB. Nephrology consulted for elevated Scr.    1) CKD-3b: Without proteinuria for which patient follows with me as an outpatient. Baseline Scr 1.4-1.6. Scr mildly increased this morning in setting of recent CT with IV contrast on 8/2 but remains near baseline. Avoid nephrotoxins. Monitor electrolytes.    2) HTN with CKD: BP controlled with tachycardia. Tachycardia as per primary team.    3) LE edema: Patient appears euvolemic. Continue with bumex 1 mg PO daily with an additional dose of 1 mg QHS MWF with aldactone 25 mg PO daily. Prior TTE with normal LVSF. Monitor UO.     4) Anemia: With iron deficiency. Continue with venofer 1 gram IV load. [place] once iron stores replete. Patient occult negative. Monitor Hb.    5) Primary HPT: Continue with sensipar 60 mg daily. Monitor serum calcium.       [facility] NEPHROLOGY  Eric Acosta M.D.  Mitch Tatum D.O.  Paty Lama M.D.  Yanci Mirza, CHARLI, ANP-C    Telephone: [phone]  Facsimile: [phone]    [address] [place] [address]  
78F with PMH of HTN, HLD, NIDDM, paroxysmal afib on Eliquis, h/o PE's (3/2023), DURAN on CPAP, ?asthma (dx 2018), and GERD presenting with progressive EPPS over past month. Admitted for workup of EPPS and anemia. 

## 2023-08-07 NOTE — PROGRESS NOTE ADULT - PROVIDER SPECIALTY LIST ADULT
Cardiology
Nephrology
Nephrology
Cardiology
Nephrology
Nephrology
Pulmonology
Pulmonology
Cardiology
Nephrology
Internal Medicine

## 2023-08-07 NOTE — DISCHARGE NOTE NURSING/CASE MANAGEMENT/SOCIAL WORK - PATIENT PORTAL LINK FT
You can access the FollowMyHealth Patient Portal offered by [facility] by registering at the following website: [email or web address]. By joining [employer]’s FollowMyHealth portal, you will also be able to view your health information using other applications (apps) compatible with our system.

## 2023-08-07 NOTE — PROGRESS NOTE ADULT - SUBJECTIVE AND OBJECTIVE BOX
[facility] NEPHROLOGY- PROGRESS NOTE    78y Female with history of HTN, DM presents with SOB. Nephrology consulted for elevated Scr.    REVIEW OF SYSTEMS:  Gen: no fevers  Cards: no chest pain  Resp: + dyspnea improving, + cough  GI: no nausea or vomiting or diarrhea  Vascular: no LE edema    penicillin (Blisters)  codeine (Other)      Hospital Medications: Medications reviewed      VITALS:  T(F): 97.6 (08-07-23 @ 06:10), Max: 97.7 (08-06-23 @ 20:15)  HR: 84 (08-07-23 @ 06:10)  BP: 129/65 (08-07-23 @ 06:10)  RR: 18 (08-07-23 @ 06:10)  SpO2: 95% (08-07-23 @ 06:10)  Wt(kg): --    08-06 @ 07:01  -  08-07 @ 07:00  --------------------------------------------------------  IN: 0 mL / OUT: 1400 mL / NET: -1400 mL        PHYSICAL EXAM:    Gen: NAD, calm  Cards: RRR, +S1/S2, no M/G/R  Resp: CTA B/L  GI: soft, NT/ND, NABS  Vascular: trace LE edema B/L        LABS:  08-07    136  |  100  |  32<H>  ----------------------------<  101<H>  4.3   |  24  |  1.78<H>    Ca    9.9      07 Aug 2023 06:40  Phos  3.6     08-07  Mg     2.40     08-07      Creatinine Trend: 1.78 <--, 1.54 <--, 1.70 <--, 1.71 <--, 1.57 <--, 1.56 <--, 1.73 <--                        8.7    10.41 )-----------( 392      ( 07 Aug 2023 06:40 )             30.6     Urine Studies:  Urinalysis Basic - ( 07 Aug 2023 06:40 )    Color:  / Appearance:  / SG:  / pH:   Gluc: 101 mg/dL / Ketone:   / Bili:  / Urobili:    Blood:  / Protein:  / Nitrite:    Leuk Esterase:  / RBC:  / WBC    Sq Epi:  / Non Sq Epi:  / Bacteria:

## 2023-08-07 NOTE — PROGRESS NOTE ADULT - SUBJECTIVE AND OBJECTIVE BOX
Tr Malcolm MD  Interventional Cardiology / Advance Heart Failure and Cardiac Transplant Specialist  [place] Office : [address] [place] [address]  Tel:   [place] Office : [address] [facility] [address]  Tel: [phone]      Subjective/Overnight events: Pt is lying in bed comfortable not in distress, no chest pains no SOB no palpitations  	  MEDICATIONS:  apixaban 2.5 milliGRAM(s) Oral two times a day  buMETAnide 1 milliGRAM(s) Oral <User Schedule>  buMETAnide 1 milliGRAM(s) Oral daily  metoprolol tartrate 25 milliGRAM(s) Oral two times a day  spironolactone 25 milliGRAM(s) Oral daily      budesonide 160 MICROgram(s)/formoterol 4.5 MICROgram(s) Inhaler 2 Puff(s) Inhalation two times a day  levalbuterol Inhalation 0.63 milliGRAM(s) Inhalation every 6 hours PRN  montelukast 10 milliGRAM(s) Oral daily  sodium chloride 3%  Inhalation 4 milliLiter(s) Inhalation every 12 hours  tiotropium 2.5 MICROgram(s) Inhaler 2 Puff(s) Inhalation daily    acetaminophen     Tablet .. 650 milliGRAM(s) Oral every 6 hours PRN    pantoprazole    Tablet 40 milliGRAM(s) Oral before breakfast    cinacalcet 60 milliGRAM(s) Oral daily  dextrose 50% Injectable 25 Gram(s) IV Push once  dextrose 50% Injectable 12.5 Gram(s) IV Push once  dextrose 50% Injectable 25 Gram(s) IV Push once  dextrose Oral Gel 15 Gram(s) Oral once PRN  glucagon  Injectable 1 milliGRAM(s) IntraMuscular once  insulin lispro (ADMELOG) corrective regimen sliding scale   SubCutaneous at bedtime  insulin lispro (ADMELOG) corrective regimen sliding scale   SubCutaneous three times a day before meals    dextrose 5%. 1000 milliLiter(s) IV Continuous <Continuous>  dextrose 5%. 1000 milliLiter(s) IV Continuous <Continuous>  iron sucrose IVPB 200 milliGRAM(s) IV Intermittent <User Schedule>  latanoprost 0.005% Ophthalmic Solution 1 Drop(s) Both EYES at bedtime      PAST MEDICAL/SURGICAL HISTORY  PAST MEDICAL & SURGICAL HISTORY:  HTN (hypertension)      Sleep apnea      Diabetes mellitus      Chronic GERD      HLD (hyperlipidemia)      S/P knee replacement  Right ( 2012 )      History of endometrial ablation  2002      S/P  section  1977      History of unilateral oophorectomy            SOCIAL HISTORY: Substance Use (street drugs): ( x ) never used  (  ) other:    FAMILY HISTORY:          PHYSICAL EXAM:  T(C): 36.7 (23 @ 12:30), Max: 36.7 (23 @ 12:30)  HR: 91 (23 @ 12:30) (74 - 91)  BP: 137/78 (23 @ 12:30) (129/65 - 137/78)  RR: 18 (23 @ 12:30) (18 - 18)  SpO2: 96% (23 @ 12:30) (95% - 100%)  Wt(kg): --  I&O's Summary    06 Aug 2023 07:01  -  07 Aug 2023 07:00  --------------------------------------------------------  IN: 0 mL / OUT: 1400 mL / NET: -1400 mL          GENERAL: NAD  EYES: EOMI, PERRLA, conjunctiva and sclera clear  ENMT: No tonsillar erythema, exudates, or enlargement  Cardiovascular: Normal S1 S2, No JVD, No murmurs, No edema  Respiratory: Lungs clear to auscultation	  Gastrointestinal:  Soft, Non-tender, + BS	  Extremities: No edema                                     8.7    10.41 )-----------( 392      ( 07 Aug 2023 06:40 )             30.6     08-07    136  |  100  |  32<H>  ----------------------------<  101<H>  4.3   |  24  |  1.78<H>    Ca    9.9      07 Aug 2023 06:40  Phos  3.6     08-07  Mg     2.40     08-07      proBNP:   Lipid Profile:   HgA1c:   TSH:     Consultant(s) Notes Reviewed:  [x ] YES  [ ] NO    Care Discussed with Consultants/Other Providers [ x] YES  [ ] NO    Imaging Personally Reviewed independently:  [x] YES  [ ] NO    All labs, radiologic studies, vitals, orders and medications list reviewed. Patient is seen and examined at bedside. Case discussed with medical team.

## 2023-08-08 ENCOUNTER — TRANSCRIPTION ENCOUNTER (OUTPATIENT)
Age: 78
End: 2023-08-08

## 2023-08-11 ENCOUNTER — APPOINTMENT (OUTPATIENT)
Dept: CARE COORDINATION | Facility: HOME HEALTH | Age: 78
End: 2023-08-11
Payer: MEDICARE

## 2023-08-11 VITALS
OXYGEN SATURATION: 95 % | HEART RATE: 70 BPM | SYSTOLIC BLOOD PRESSURE: 140 MMHG | TEMPERATURE: 97.3 F | DIASTOLIC BLOOD PRESSURE: 70 MMHG | RESPIRATION RATE: 18 BRPM

## 2023-08-11 DIAGNOSIS — E78.5 HYPERLIPIDEMIA, UNSPECIFIED: ICD-10-CM

## 2023-08-11 DIAGNOSIS — E11.9 TYPE 2 DIABETES MELLITUS W/OUT COMPLICATIONS: ICD-10-CM

## 2023-08-11 DIAGNOSIS — I10 ESSENTIAL (PRIMARY) HYPERTENSION: ICD-10-CM

## 2023-08-11 DIAGNOSIS — I48.0 PAROXYSMAL ATRIAL FIBRILLATION: ICD-10-CM

## 2023-08-11 PROCEDURE — 99349 HOME/RES VST EST MOD MDM 40: CPT

## 2023-08-11 RX ORDER — POTASSIUM CHLORIDE 1500 MG/1
20 TABLET, FILM COATED, EXTENDED RELEASE ORAL
Qty: 90 | Refills: 0 | Status: DISCONTINUED | COMMUNITY
Start: 2023-04-27

## 2023-08-11 NOTE — CURRENT MEDS
[Takes medication as prescribed] : takes [None] : Patient does not have any barriers to medication adherence [Yes] : Reviewed medication list for presence of high-risk medications. [Blood Thinners] : blood thinners [FreeTextEntry1] : Eliquis - maria del carmen any bleeding episode since being home

## 2023-08-11 NOTE — HEALTH RISK ASSESSMENT
[Never (0 pts)] : Never (0 points) [No] : In the past 12 months have you used drugs other than those required for medical reasons? No [No falls in past year] : Patient reported no falls in the past year [Patient refused screening] : Patient refused screening [Audit-CScore] : 0 [de-identified] : minimal ambulating indoors [Diabetic Diet] : diabetic [Low Salt Diet] : low salt [General Adherence] : general adherence [FreeTextEntry1] : low potassium, low phophorus [Change in mental status noted] : No change in mental status noted [Language] : denies difficulty with language [Behavior] : denies difficulty with behavior [Learning/Retaining New Information] : denies difficulty learning/retaining new information [Handling Complex Tasks] : denies difficulty handling complex tasks [Reasoning] : denies difficulty with reasoning [Spatial Ability and Orientation] : denies difficulty with spatial ability and orientation [None] : None [With Family] : lives with family [Fully functional (bathing, dressing, toileting, transferring, walking, feeding)] : Fully functional (bathing, dressing, toileting, transferring, walking, feeding) [Fully functional (using the telephone, shopping, preparing meals, housekeeping, doing laundry, using] : Fully functional and needs no help or supervision to perform IADLs (using the telephone, shopping, preparing meals, housekeeping, doing laundry, using transportation, managing medications and managing finances) [Reports changes in hearing] : Reports no changes in hearing [Reports changes in vision] : Reports no changes in vision [Reports changes in dental health] : Reports no changes in dental health [Smoke Detector] : smoke detector [Carbon Monoxide Detector] : carbon monoxide detector [Safety elements used in home] : safety elements used in home [de-identified] : antonio [Patient/Caregiver unclear of wishes] : , patient/caregiver unclear of wishes [Aggressive treatment] : aggressive treatment [AdvancecareDate] : 08/23 [Never] : Never

## 2023-08-11 NOTE — PLAN
[FreeTextEntry1] : 1. f/u with PCP for telvisit today 2. cont all medications as prescribed 3. maintain diet, f/u ENDO for assistance with customizing a lifestyle diet program.  4. discuss with PCP concerns about her health and sadness 5. dgtr to make appt with HEMEONC, NEPHRO 6. Contact TCM for any questions or concerns  Rotation Flap Text: The defect edges were debeveled with a #15 scalpel blade.  Given the location of the defect, shape of the defect and the proximity to free margins a rotation flap was deemed most appropriate.  Using a sterile surgical marker, an appropriate rotation flap was drawn incorporating the defect and placing the expected incisions within the relaxed skin tension lines where possible.    The area thus outlined was incised deep to adipose tissue with a #15 scalpel blade.  The skin margins were undermined to an appropriate distance in all directions utilizing iris scissors.

## 2023-08-11 NOTE — PHYSICAL EXAM
[No Acute Distress] : no acute distress [Normal Sclera/Conjunctiva] : normal sclera/conjunctiva [Normal Outer Ear/Nose] : the outer ears and nose were normal in appearance [No JVD] : no jugular venous distention [Supple] : supple [No Respiratory Distress] : no respiratory distress  [Clear to Auscultation] : lungs were clear to auscultation bilaterally [No Accessory Muscle Use] : no accessory muscle use [Soft] : abdomen soft [Non Tender] : non-tender [No CVA Tenderness] : no CVA  tenderness [Speech Grossly Normal] : speech grossly normal [Memory Grossly Normal] : memory grossly normal [Normal Affect] : the affect was normal [Alert and Oriented x3] : oriented to person, place, and time [Normal Mood] : the mood was normal [Normal Insight/Judgement] : insight and judgment were intact [Comprehensive Foot Exam Normal] : Right and left foot were examined and both feet are normal. No ulcers in either foot. Toes are normal and with full ROM.  Normal tactile sensation with monofilament testing throughout both feet [de-identified] : ambulate with a cane

## 2023-08-11 NOTE — HISTORY OF PRESENT ILLNESS
[Post-hospitalization from ___ Hospital] : Post-hospitalization from [unfilled] Hospital [Admitted on: ___] : The patient was admitted on [unfilled] [Discharged on ___] : discharged on [unfilled] [Discharge Summary] : discharge summary [Discharge Med List] : discharge medication list [Other: ____] : [unfilled] [Med Reconciliation] : medication reconciliation has been completed [Patient Contacted By: ____] : and contacted by [unfilled] [FreeTextEntry2] : 78F with PMH of HTN, HLD, NIDDM, paroxysmal afib on Eliquis, h/o PE's (3/2023), DURAN on CPAP, ?asthma (dx 2018), and GERD presenting with progressive EPPS over past month. Admitted for workup of EPPS and anemia.  Home visit with pt. She is alert and oriented x 4. Lives with nephew. Dgtr lives close by very involved in her care - takes her to medical appointments. She ambulates independently with a cane indoors.  No changes in gait or falls reported. Since being discharged from the hospital: Gout flair up to lower extremities toe to ankle. No redness, warmth  noted, edema to the area. Pt repot having a turkey sandwich yesterday, denies consuming any foods with high levels of uric acid. Normally take Allupurinol 300mg daily. T/c to PCP's office they are setting up f/u telehealth with her PCP for later today.   No changes in appetite, dentition, swallowing, sleep disturbed to void, difficulty falling back to sleep. No changes in bladder and bowel function. No changes in vision, hearing or smelling, or mental status.  Patient/caregiver appears very sad, concerned about the changes in her health and no concrete dx. Offered BH she refused stating she has supportive friends. Patient is independent in ADLs and IADLs. Home assessment done environment is safe.  Medications reviewed, pt able to state each med and reason for taking it, she is compliant. Taking Eliquis - has ecchymotic areas on arms. No bleeding noted or reported.  Pt was seen by her cardiologist yesterday, now wearing a holter monitor until Tuesday.  Impressed on pt importance of seeing the [facility] for f/u of DVT, since unknown etiology. States dgtr makes her appts so she has to wait for her to make the appts. To see her ENDO and get referral for diabetic educator - pt with CKD 3 in addition to HTN and DM. Today's BG was 111mg/dl. F/u with her  NEPHRO.   Explained TCM's role in her care, yellow card given for any f/u questions or concerns.  Goals of care discussed. Pt hesitant have not had the conversation with any providers, will need f/u conversations with her family to make decision.

## 2023-08-11 NOTE — REVIEW OF SYSTEMS
[Fatigue] : fatigue [Lower Ext Edema] : lower extremity edema [Cough] : cough [Dyspnea on Exertion] : dyspnea on exertion [Constipation] : constipation [Joint Pain] : joint pain [Joint Stiffness] : joint stiffness [Joint Swelling] : joint swelling [Muscle Weakness] : muscle weakness [Muscle Pain] : muscle pain [Back Pain] : back pain [Unsteady Walking] : ataxia [Insomnia] : insomnia [Negative] : Heme/Lymph [Fever] : no fever [Chills] : no chills [Discharge] : no discharge [Pain] : no pain [Redness] : no redness [Dryness] : no dryness  [Vision Problems] : no vision problems [Hearing Loss] : no hearing loss [Chest Pain] : no chest pain [Palpitations] : no palpitations [Shortness Of Breath] : no shortness of breath [Wheezing] : no wheezing [Abdominal Pain] : no abdominal pain [Nausea] : no nausea [Diarrhea] : diarrhea [Incontinence] : no incontinence [Skin Rash] : no skin rash [Headache] : no headache [FreeTextEntry6] : intermittent  [de-identified] : uses a cane

## 2023-08-12 ENCOUNTER — OUTPATIENT (OUTPATIENT)
Dept: OUTPATIENT SERVICES | Facility: HOSPITAL | Age: 78
LOS: 1 days | End: 2023-08-12
Payer: MEDICARE

## 2023-08-12 ENCOUNTER — APPOINTMENT (OUTPATIENT)
Dept: ULTRASOUND IMAGING | Facility: CLINIC | Age: 78
End: 2023-08-12
Payer: MEDICARE

## 2023-08-12 DIAGNOSIS — Z90.721 ACQUIRED ABSENCE OF OVARIES, UNILATERAL: Chronic | ICD-10-CM

## 2023-08-12 DIAGNOSIS — Z98.890 OTHER SPECIFIED POSTPROCEDURAL STATES: Chronic | ICD-10-CM

## 2023-08-12 DIAGNOSIS — Z96.659 PRESENCE OF UNSPECIFIED ARTIFICIAL KNEE JOINT: Chronic | ICD-10-CM

## 2023-08-12 DIAGNOSIS — Z98.891 HISTORY OF UTERINE SCAR FROM PREVIOUS SURGERY: Chronic | ICD-10-CM

## 2023-08-12 DIAGNOSIS — Z00.8 ENCOUNTER FOR OTHER GENERAL EXAMINATION: ICD-10-CM

## 2023-08-12 PROCEDURE — 93970 EXTREMITY STUDY: CPT

## 2023-08-12 PROCEDURE — 93970 EXTREMITY STUDY: CPT | Mod: 26

## 2023-08-16 ENCOUNTER — APPOINTMENT (OUTPATIENT)
Dept: ORTHOPEDIC SURGERY | Facility: CLINIC | Age: 78
End: 2023-08-16
Payer: MEDICARE

## 2023-08-16 VITALS — WEIGHT: 267 LBS | BODY MASS INDEX: 50.41 KG/M2 | HEIGHT: 61 IN

## 2023-08-16 DIAGNOSIS — M17.0 BILATERAL PRIMARY OSTEOARTHRITIS OF KNEE: ICD-10-CM

## 2023-08-16 DIAGNOSIS — Z96.653 PRESENCE OF ARTIFICIAL KNEE JOINT, BILATERAL: ICD-10-CM

## 2023-08-16 PROCEDURE — 73562 X-RAY EXAM OF KNEE 3: CPT | Mod: 50

## 2023-08-16 PROCEDURE — 99213 OFFICE O/P EST LOW 20 MIN: CPT

## 2023-08-16 RX ORDER — CLINDAMYCIN HYDROCHLORIDE 300 MG/1
300 CAPSULE ORAL
Qty: 10 | Refills: 2 | Status: ACTIVE | COMMUNITY
Start: 2023-08-16 | End: 1900-01-01

## 2023-08-16 NOTE — HISTORY OF PRESENT ILLNESS
[de-identified] : TORI WOOTEN 78 year old female presents for follow-up evaluation s/p RIGHT TKR at 9 years and s/p LEFT TKR at 2 years. At her last visit, she had some issues with falls and balances. She was referred to PT to help with balance, gate, and fall prevention. She did not go to PT. She has been doing her own home exercises which seem to be helping as she has not had a fall since her last visit. She uses a cane for ambulation. She developed SOB in November after her last visit. She went to a pulmonologist who could not determine the reason behind her SOB and thought it may have something to do with heart failure. She was referred to a cardiologist and had blood work done in March. Her results indicated elevated D-Dimer and she was found to have DVT and PE. She was hospitalized for 10 days back in March. Since her discharge, she has been taking Eliquis 2.5 two times a day. She had a repeat workup, and it continues to show right perineal DVT. She is following up with her cardiologist on August 24 and her pulmonologist on August 31.

## 2023-08-16 NOTE — DISCUSSION/SUMMARY
[de-identified] : Patient is doing well following their s/p Bilateral TKR. I reviewed x-rays with them. I have reassured them that their implants are functioning well.   She is encouraged to continue to stay active with a cane for ambulation and a home exercise program. Antibiotic prophylaxis was reviewed with Clindamycin.  She will follow up with her cardiologist and pulmonologist for PE, and she is currently on Eliquis.  I will see them back in 6 months.

## 2023-08-16 NOTE — PHYSICAL EXAM
[de-identified] : General appearance: well-nourished and hydrated, pleasant, alert and oriented x 3, cooperative. HEENT: Normocephalic, EOM intact, Nasal septum midline, Oral cavity clear, External auditory canal clear. Cardiovascular: no apparent abnormalities, no lower leg edema, no varicosities, pedal pulses are palpable. Lymphatics Lymph nodes: none palpated, Lymphedema: not present. Neurologic: sensation is normal, no muscle weakness in upper or lower extremities, patella tendon reflexes intact . Dermatologic no apparent skin lesions, moist, warm, no rash. Spine:cervical spine appears normal and moves freely, thoracic spine appears normal and moves freely, lumbosacral spine appears normal and moves freely. Gait: nonantalgic.  Left Knee Inspection: no effusion Wounds: healed midline incision Alignment: normal. Palpation: no specific tenderness on palpation. ROM: Active (in degrees): 0-125 Ligamentous laxity (neg): negative ant. drawer test, negative post. drawer test, stable to varus stress test, stable to valgus stress test, Patellofemoral Alignment Test: Q angle-, normal. Muscle Test: good quad strength. Leg examination: calf is soft and non-tender.  Right Knee Inspection: no effusion Wounds: healed midline incision Alignment: normal. Palpation: no specific tenderness on palpation. ROM: Active (in degrees): 0-125 Ligamentous laxity (neg): negative ant. drawer test, negative post. drawer test, stable to varus stress test, stable to valgus stress test, Patellofemoral Alignment Test: Q angle-, normal. Muscle Test: good quad strength. Leg examination: calf is soft and non-tender. [de-identified] : Right knee x-ray, AP, lateral, merchant view taken at the office today demonstrates a total knee replacement in satisfactory position and alignment. No evidence of loosening. The patella sits in a central position.  Left knee x-ray, AP, lateral, merchant view taken at the office today demonstrates a total knee replacement in satisfactory position and alignment. No evidence of loosening. The patella sits in a central position.

## 2023-08-16 NOTE — ADDENDUM
[FreeTextEntry1] : This note was written by James Ly on 08/16/2023 acting as scribe for Dr. Joselo WHITLEY I, Dr. Joselo Ramírez, have read and attest that all the information, medical decision making and discharge instructions within are true and accurate.  This note was written by TISHA SMITH on 08/16/2023 acting as scribe for Dr. Joselo WHITLEY I, Dr. Joselo Ramírez, have read and attest that all the information, medical decision making and discharge instructions within are true and accurate.

## 2023-08-30 ENCOUNTER — NON-APPOINTMENT (OUTPATIENT)
Age: 78
End: 2023-08-30

## 2023-08-31 ENCOUNTER — APPOINTMENT (OUTPATIENT)
Dept: PULMONOLOGY | Facility: CLINIC | Age: 78
End: 2023-08-31
Payer: MEDICARE

## 2023-08-31 VITALS — HEART RATE: 85 BPM | DIASTOLIC BLOOD PRESSURE: 71 MMHG | OXYGEN SATURATION: 91 % | SYSTOLIC BLOOD PRESSURE: 125 MMHG

## 2023-08-31 LAB
ALBUMIN SERPL ELPH-MCNC: 4.6 G/DL
ALP BLD-CCNC: 150 U/L
ALT SERPL-CCNC: 24 U/L
ANION GAP SERPL CALC-SCNC: 13 MMOL/L
AST SERPL-CCNC: 23 U/L
BILIRUB SERPL-MCNC: 0.3 MG/DL
BUN SERPL-MCNC: 27 MG/DL
CALCIUM SERPL-MCNC: 10 MG/DL
CHLORIDE SERPL-SCNC: 102 MMOL/L
CO2 SERPL-SCNC: 25 MMOL/L
CREAT SERPL-MCNC: 1.51 MG/DL
DEPRECATED D DIMER PPP IA-ACNC: 202 NG/ML DDU
EGFR: 35 ML/MIN/1.73M2
FERRITIN SERPL-MCNC: 51 NG/ML
GLUCOSE SERPL-MCNC: 137 MG/DL
HCT VFR BLD CALC: 38.8 %
HGB BLD-MCNC: 11.2 G/DL
IRON SATN MFR SERPL: 8 %
IRON SERPL-MCNC: 34 UG/DL
MCHC RBC-ENTMCNC: 24.8 PG
MCHC RBC-ENTMCNC: 28.9 GM/DL
MCV RBC AUTO: 86 FL
NT-PROBNP SERPL-MCNC: 141 PG/ML
PLATELET # BLD AUTO: 343 K/UL
POTASSIUM SERPL-SCNC: 4.4 MMOL/L
PROT SERPL-MCNC: 6.8 G/DL
RBC # BLD: 4.51 M/UL
RBC # FLD: 24.5 %
SODIUM SERPL-SCNC: 140 MMOL/L
TIBC SERPL-MCNC: 428 UG/DL
UIBC SERPL-MCNC: 393 UG/DL
WBC # FLD AUTO: 4.93 K/UL

## 2023-08-31 PROCEDURE — 94618 PULMONARY STRESS TESTING: CPT

## 2023-08-31 PROCEDURE — 99214 OFFICE O/P EST MOD 30 MIN: CPT | Mod: 25

## 2023-08-31 RX ORDER — APIXABAN 5 MG/1
5 TABLET, FILM COATED ORAL
Qty: 180 | Refills: 1 | Status: DISCONTINUED | COMMUNITY
Start: 2023-04-04 | End: 2023-08-31

## 2023-09-01 ENCOUNTER — APPOINTMENT (OUTPATIENT)
Dept: VASCULAR SURGERY | Facility: CLINIC | Age: 78
End: 2023-09-01

## 2023-09-01 NOTE — ASSESSMENT
[FreeTextEntry1] : Ms. TORI WOOTEN is an 78 year old female, history of Obstructive sleep apnea, obesity, asthma with COPD, Pulmonary embolism.  Continue Trelegy Ellipta and Montelukast daily for asthma treatment.  Overall she is feeling better I do have concern regarding the possibility that she is being undertreated for her pulmonary embolism as she is receiving only half dose anticoagulants.  The oxygen desaturation with exertion does suggest this possibility however the remainder of the work-up including the D-dimer the proBNP and the remainder of her labs show that she is receiving optimal treatment.  The alternative would be to place an IVC filter and I do not see an indication to do that at the current time.  Close follow-up will be necessary.

## 2023-09-01 NOTE — HISTORY OF PRESENT ILLNESS
[Never] : never [TextBox_4] : Prior: Patient reports that her dyspnea intermittently worsens and improves from a case to case basis. She also states of having an elevated heart rate. Patient reports that she continues to take Trelegy for asthma treatment.  Still with shortness of breath on exertion does not appear to be fully recovering after her pulmonary embolism.  Current: Current: TORI WOOTEN is a 78 year old female, with history of Obstructive sleep apnea, obesity, asthma with COPD, Pulmonary embolism, who presents to the office for follow up evaluation. Patient reports that she is feeling well overall and is has been improving since her last visit. She states that she continues to take Eliquis 2.5 mg twice a day for her history of Pulmonary embolism.  She was hospitalized for evidence of iron deficiency anemia and is receiving iron infusion supplementation.  The dose of Eliquis was reduced.  Overall she is feeling better.

## 2023-09-01 NOTE — ADDENDUM
[FreeTextEntry1] : I, Alfredo Piyush, acted solely as a scribe for Dr. Ra Valenzuela M.D. on this date 08/31/2023.   All medical record entries made by the Scribe were at my, Dr. Ra Valenzuela M.D., direction and personally dictated by me on 08/31/2023. I have reviewed the chart and agree that the record accurately reflects my personal performance of the history, physical exam, assessment and plan. I have also personally directed, reviewed, and agreed with the chart.

## 2023-09-01 NOTE — PROCEDURE
[FreeTextEntry1] : Venipuncture with labs drawn in office.   Pulmonary stress test demonstrates oxygen desaturation with exertion Venous Doppler study demonstrates persistent DVT

## 2023-09-01 NOTE — REASON FOR VISIT
[Follow-Up - From Hospitalization] : a follow-up visit after a recent hospitalization [Asthma] : asthma [Pulmonary Embolism] : pulmonary embolism [TextBox_44] : Lab work

## 2023-09-05 ENCOUNTER — TRANSCRIPTION ENCOUNTER (OUTPATIENT)
Age: 78
End: 2023-09-05

## 2023-09-20 ENCOUNTER — APPOINTMENT (OUTPATIENT)
Dept: OBGYN | Facility: CLINIC | Age: 78
End: 2023-09-20
Payer: MEDICARE

## 2023-09-20 PROCEDURE — 76830 TRANSVAGINAL US NON-OB: CPT

## 2023-09-20 PROCEDURE — G0101: CPT

## 2023-09-20 PROCEDURE — 81002 URINALYSIS NONAUTO W/O SCOPE: CPT

## 2023-09-20 PROCEDURE — 99213 OFFICE O/P EST LOW 20 MIN: CPT | Mod: 25

## 2023-09-20 PROCEDURE — 82270 OCCULT BLOOD FECES: CPT

## 2023-09-27 ENCOUNTER — OUTPATIENT (OUTPATIENT)
Dept: OUTPATIENT SERVICES | Facility: HOSPITAL | Age: 78
LOS: 1 days | Discharge: HOME | End: 2023-09-27

## 2023-09-27 DIAGNOSIS — D64.9 ANEMIA, UNSPECIFIED: ICD-10-CM

## 2023-09-27 DIAGNOSIS — Z98.891 HISTORY OF UTERINE SCAR FROM PREVIOUS SURGERY: Chronic | ICD-10-CM

## 2023-09-27 DIAGNOSIS — Z98.890 OTHER SPECIFIED POSTPROCEDURAL STATES: Chronic | ICD-10-CM

## 2023-09-27 DIAGNOSIS — Z90.721 ACQUIRED ABSENCE OF OVARIES, UNILATERAL: Chronic | ICD-10-CM

## 2023-09-27 DIAGNOSIS — Z96.659 PRESENCE OF UNSPECIFIED ARTIFICIAL KNEE JOINT: Chronic | ICD-10-CM

## 2023-09-28 ENCOUNTER — RESULT REVIEW (OUTPATIENT)
Age: 78
End: 2023-09-28

## 2023-09-28 ENCOUNTER — APPOINTMENT (OUTPATIENT)
Dept: HEMATOLOGY ONCOLOGY | Facility: CLINIC | Age: 78
End: 2023-09-28
Payer: MEDICARE

## 2023-09-28 ENCOUNTER — NON-APPOINTMENT (OUTPATIENT)
Age: 78
End: 2023-09-28

## 2023-09-28 VITALS
RESPIRATION RATE: 18 BRPM | SYSTOLIC BLOOD PRESSURE: 120 MMHG | WEIGHT: 262.2 LBS | DIASTOLIC BLOOD PRESSURE: 72 MMHG | OXYGEN SATURATION: 96 % | HEIGHT: 61 IN | HEART RATE: 77 BPM | BODY MASS INDEX: 49.5 KG/M2 | TEMPERATURE: 97.4 F

## 2023-09-28 DIAGNOSIS — Z82.49 FAMILY HISTORY OF ISCHEMIC HEART DISEASE AND OTHER DISEASES OF THE CIRCULATORY SYSTEM: ICD-10-CM

## 2023-09-28 DIAGNOSIS — I82.90 ACUTE EMBOLISM AND THROMBOSIS OF UNSPECIFIED VEIN: ICD-10-CM

## 2023-09-28 LAB
BASOPHILS # BLD AUTO: 0.05 K/UL — SIGNIFICANT CHANGE UP (ref 0–0.2)
BASOPHILS NFR BLD AUTO: 0.9 % — SIGNIFICANT CHANGE UP (ref 0–2)
EOSINOPHIL # BLD AUTO: 0.1 K/UL — SIGNIFICANT CHANGE UP (ref 0–0.5)
EOSINOPHIL NFR BLD AUTO: 1.8 % — SIGNIFICANT CHANGE UP (ref 0–6)
HCT VFR BLD CALC: 38.6 % — SIGNIFICANT CHANGE UP (ref 34.5–45)
HGB BLD-MCNC: 11.5 G/DL — SIGNIFICANT CHANGE UP (ref 11.5–15.5)
IMM GRANULOCYTES NFR BLD AUTO: 0.4 % — SIGNIFICANT CHANGE UP (ref 0–0.9)
LYMPHOCYTES # BLD AUTO: 1.16 K/UL — SIGNIFICANT CHANGE UP (ref 1–3.3)
LYMPHOCYTES # BLD AUTO: 20.9 % — SIGNIFICANT CHANGE UP (ref 13–44)
MCHC RBC-ENTMCNC: 25.4 PG — LOW (ref 27–34)
MCHC RBC-ENTMCNC: 30.3 G/DL — LOW (ref 32–36)
MCV RBC AUTO: 83.9 FL — SIGNIFICANT CHANGE UP (ref 80–100)
MONOCYTES # BLD AUTO: 0.76 K/UL — SIGNIFICANT CHANGE UP (ref 0–0.9)
MONOCYTES NFR BLD AUTO: 13.7 % — SIGNIFICANT CHANGE UP (ref 2–14)
NEUTROPHILS # BLD AUTO: 3.46 K/UL — SIGNIFICANT CHANGE UP (ref 1.8–7.4)
NEUTROPHILS NFR BLD AUTO: 62.3 % — SIGNIFICANT CHANGE UP (ref 43–77)
NRBC # BLD: 0 /100 WBCS — SIGNIFICANT CHANGE UP (ref 0–0)
PLATELET # BLD AUTO: 288 K/UL — SIGNIFICANT CHANGE UP (ref 150–400)
RBC # BLD: 4.6 M/UL — SIGNIFICANT CHANGE UP (ref 3.8–5.2)
RBC # FLD: 21.2 % — HIGH (ref 10.3–14.5)
RETICS #: 50.6 K/UL — SIGNIFICANT CHANGE UP (ref 25–125)
RETICS/RBC NFR: 1.1 % — SIGNIFICANT CHANGE UP (ref 0.5–2.5)
WBC # BLD: 5.55 K/UL — SIGNIFICANT CHANGE UP (ref 3.8–10.5)
WBC # FLD AUTO: 5.55 K/UL — SIGNIFICANT CHANGE UP (ref 3.8–10.5)

## 2023-09-28 PROCEDURE — 99205 OFFICE O/P NEW HI 60 MIN: CPT

## 2023-10-02 ENCOUNTER — RX RENEWAL (OUTPATIENT)
Age: 78
End: 2023-10-02

## 2023-10-02 RX ORDER — ALBUTEROL SULFATE 90 UG/1
108 (90 BASE) INHALANT RESPIRATORY (INHALATION)
Qty: 1 | Refills: 1 | Status: ACTIVE | COMMUNITY
Start: 2022-06-28 | End: 1900-01-01

## 2023-10-06 LAB
ALBUMIN MFR SERPL ELPH: 57.9 %
ALBUMIN SERPL ELPH-MCNC: 4.4 G/DL
ALBUMIN SERPL-MCNC: 3.9 G/DL
ALBUMIN/GLOB SERPL: 1.3 RATIO
ALP BLD-CCNC: 99 U/L
ALPHA1 GLOB MFR SERPL ELPH: 5 %
ALPHA1 GLOB SERPL ELPH-MCNC: 0.3 G/DL
ALPHA2 GLOB MFR SERPL ELPH: 11.5 %
ALPHA2 GLOB SERPL ELPH-MCNC: 0.8 G/DL
ALT SERPL-CCNC: 16 U/L
ANION GAP SERPL CALC-SCNC: 14 MMOL/L
APCR PPP: 2.8 RATIO
AST SERPL-CCNC: 18 U/L
AT III PPP CHRO-ACNC: 114 %
B-GLOBULIN MFR SERPL ELPH: 14 %
B-GLOBULIN SERPL ELPH-MCNC: 1 G/DL
B2 GLYCOPROT1 IGG SER-ACNC: <5 SGU
B2 GLYCOPROT1 IGM SER-ACNC: <5 SMU
BILIRUB SERPL-MCNC: 0.3 MG/DL
BUN SERPL-MCNC: 35 MG/DL
CALCIUM SERPL-MCNC: 9.6 MG/DL
CARDIOLIPIN IGM SER-MCNC: 5.6 GPL
CARDIOLIPIN IGM SER-MCNC: <5 MPL
CHLORIDE SERPL-SCNC: 103 MMOL/L
CO2 SERPL-SCNC: 23 MMOL/L
CONFIRM: 45.3 SEC
CREAT SERPL-MCNC: 1.6 MG/DL
DEPRECATED KAPPA LC FREE/LAMBDA SER: 1.29 RATIO
DRVVT 1H NP PPP: 41.7 SEC
DRVVT IMM 1:2 NP PPP: NORMAL
DRVVT SCREEN TO CONFIRM RATIO: 0.93 RATIO
EGFR: 32 ML/MIN/1.73M2
FERRITIN SERPL-MCNC: 27 NG/ML
FOLATE SERPL-MCNC: >20 NG/ML
FVL BLANK: 36.4
FVL TEST: 102.1
GAMMA GLOB FLD ELPH-MCNC: 0.8 G/DL
GAMMA GLOB MFR SERPL ELPH: 11.6 %
GLUCOSE SERPL-MCNC: 109 MG/DL
HAPTOGLOB SERPL-MCNC: 117 MG/DL
HCYS SERPL-MCNC: 10.2 UMOL/L
IGA SER QL IEP: 222 MG/DL
IGG SER QL IEP: 865 MG/DL
IGM SER QL IEP: 34 MG/DL
INTERPRETATION SERPL IEP-IMP: NORMAL
IRON SATN MFR SERPL: 9 %
IRON SERPL-MCNC: 39 UG/DL
KAPPA LC CSF-MCNC: 1.97 MG/DL
KAPPA LC SERPL-MCNC: 2.54 MG/DL
LDH SERPL-CCNC: 162 U/L
M PROTEIN SPEC IFE-MCNC: NORMAL
POTASSIUM SERPL-SCNC: 4.3 MMOL/L
PROT C PPP CHRO-ACNC: 134 %
PROT S AG ACT/NOR PPP IA: 68 %
PROT S FREE PPP-ACNC: 104 %
PROT SERPL-MCNC: 6.8 G/DL
SCREEN DRVVT: 49.6 SEC
SILICA CLOTTING TIME INTERPRETATION: NORMAL
SILICA CLOTTING TIME S/C: 0.97 RATIO
SODIUM SERPL-SCNC: 139 MMOL/L
TIBC SERPL-MCNC: 416 UG/DL
UIBC SERPL-MCNC: 377 UG/DL
VIT B12 SERPL-MCNC: >2000 PG/ML

## 2023-10-11 ENCOUNTER — NON-APPOINTMENT (OUTPATIENT)
Age: 78
End: 2023-10-11

## 2023-10-13 PROBLEM — I82.90 ACUTE DEEP VEIN THROMBOSIS (DVT) OF NON-EXTREMITY VEIN: Status: ACTIVE | Noted: 2023-08-11

## 2023-10-13 PROBLEM — Z82.49 FAMILY HISTORY OF PULMONARY EMBOLISM: Status: ACTIVE | Noted: 2023-10-13

## 2023-10-13 RX ORDER — METFORMIN ER 500 MG 500 MG/1
500 TABLET ORAL
Refills: 0 | Status: ACTIVE | COMMUNITY
Start: 2021-06-07

## 2023-10-13 RX ORDER — ALLOPURINOL 200 MG/1
TABLET ORAL
Refills: 0 | Status: ACTIVE | COMMUNITY

## 2023-10-13 RX ORDER — METOPROLOL TARTRATE 25 MG/1
25 TABLET, FILM COATED ORAL TWICE DAILY
Refills: 0 | Status: ACTIVE | COMMUNITY
Start: 2023-08-07

## 2023-10-31 ENCOUNTER — APPOINTMENT (OUTPATIENT)
Dept: PULMONOLOGY | Facility: CLINIC | Age: 78
End: 2023-10-31
Payer: MEDICARE

## 2023-10-31 VITALS — SYSTOLIC BLOOD PRESSURE: 143 MMHG | DIASTOLIC BLOOD PRESSURE: 73 MMHG | HEART RATE: 72 BPM | OXYGEN SATURATION: 96 %

## 2023-10-31 DIAGNOSIS — M10.241: ICD-10-CM

## 2023-10-31 PROCEDURE — 99214 OFFICE O/P EST MOD 30 MIN: CPT | Mod: 25

## 2023-10-31 PROCEDURE — 94618 PULMONARY STRESS TESTING: CPT

## 2023-10-31 RX ORDER — APIXABAN 2.5 MG/1
2.5 TABLET, FILM COATED ORAL
Qty: 180 | Refills: 3 | Status: ACTIVE | COMMUNITY
Start: 2023-07-28 | End: 1900-01-01

## 2023-11-07 NOTE — ED ADULT TRIAGE NOTE - CHIEF COMPLAINT QUOTE
(+) right knee flexion contracture
Check labs    Pneumonia vaccines:  prevnar 20 today    Shingles vaccines:  Going to check with heme to make sure he can take it    Colonoscopy:  Up to date    Had the fu shot    Will get the booster     
Check psa  
Recheck   Encouraged to exercise  
Sees [facility]  Goes in twice a year  Gets an us yearly  
recheck  
pt sent in by Cardiologist for elevated D dimer (8.54) and elevated creatinine (1.79). pt c/o cough and shortness of breath x 2 weeks, worsening. also reports swelling to b/l LE. no chest pain, palpitations. hx. HTN, HLD, DM

## 2023-11-28 ENCOUNTER — APPOINTMENT (OUTPATIENT)
Dept: PULMONOLOGY | Facility: CLINIC | Age: 78
End: 2023-11-28
Payer: MEDICARE

## 2023-11-28 VITALS — OXYGEN SATURATION: 92 % | HEART RATE: 83 BPM | DIASTOLIC BLOOD PRESSURE: 84 MMHG | SYSTOLIC BLOOD PRESSURE: 124 MMHG

## 2023-11-28 DIAGNOSIS — K21.9 GASTRO-ESOPHAGEAL REFLUX DISEASE W/OUT ESOPHAGITIS: ICD-10-CM

## 2023-11-28 PROCEDURE — G0008: CPT

## 2023-11-28 PROCEDURE — 90662 IIV NO PRSV INCREASED AG IM: CPT

## 2023-11-28 PROCEDURE — 99213 OFFICE O/P EST LOW 20 MIN: CPT | Mod: 25

## 2023-11-28 RX ORDER — TIOTROPIUM BROMIDE INHALATION SPRAY 3.12 UG/1
2.5 SPRAY, METERED RESPIRATORY (INHALATION)
Qty: 4 | Refills: 0 | Status: COMPLETED | COMMUNITY
Start: 2023-08-07 | End: 2023-11-28

## 2023-11-29 PROBLEM — K21.9 GASTROESOPHAGEAL REFLUX DISEASE WITHOUT ESOPHAGITIS: Status: ACTIVE | Noted: 2018-07-27

## 2023-12-14 ENCOUNTER — OUTPATIENT (OUTPATIENT)
Dept: OUTPATIENT SERVICES | Facility: HOSPITAL | Age: 78
LOS: 1 days | Discharge: HOME | End: 2023-12-14

## 2023-12-14 DIAGNOSIS — D64.9 ANEMIA, UNSPECIFIED: ICD-10-CM

## 2023-12-14 DIAGNOSIS — Z96.659 PRESENCE OF UNSPECIFIED ARTIFICIAL KNEE JOINT: Chronic | ICD-10-CM

## 2023-12-14 DIAGNOSIS — Z98.891 HISTORY OF UTERINE SCAR FROM PREVIOUS SURGERY: Chronic | ICD-10-CM

## 2023-12-14 DIAGNOSIS — Z98.890 OTHER SPECIFIED POSTPROCEDURAL STATES: Chronic | ICD-10-CM

## 2023-12-14 DIAGNOSIS — Z90.721 ACQUIRED ABSENCE OF OVARIES, UNILATERAL: Chronic | ICD-10-CM

## 2023-12-26 ENCOUNTER — APPOINTMENT (OUTPATIENT)
Dept: PULMONOLOGY | Facility: CLINIC | Age: 78
End: 2023-12-26
Payer: MEDICARE

## 2023-12-26 VITALS — OXYGEN SATURATION: 93 % | DIASTOLIC BLOOD PRESSURE: 78 MMHG | HEART RATE: 68 BPM | SYSTOLIC BLOOD PRESSURE: 117 MMHG

## 2023-12-26 DIAGNOSIS — Z23 ENCOUNTER FOR IMMUNIZATION: ICD-10-CM

## 2023-12-26 PROCEDURE — 99213 OFFICE O/P EST LOW 20 MIN: CPT | Mod: 25

## 2023-12-26 PROCEDURE — 90677 PCV20 VACCINE IM: CPT

## 2023-12-26 PROCEDURE — G0009: CPT

## 2023-12-27 ENCOUNTER — RESULT REVIEW (OUTPATIENT)
Age: 78
End: 2023-12-27

## 2023-12-27 ENCOUNTER — APPOINTMENT (OUTPATIENT)
Dept: HEMATOLOGY ONCOLOGY | Facility: CLINIC | Age: 78
End: 2023-12-27
Payer: MEDICARE

## 2023-12-27 VITALS
TEMPERATURE: 97.4 F | OXYGEN SATURATION: 95 % | BODY MASS INDEX: 49.38 KG/M2 | HEART RATE: 82 BPM | SYSTOLIC BLOOD PRESSURE: 138 MMHG | WEIGHT: 261.53 LBS | DIASTOLIC BLOOD PRESSURE: 81 MMHG | HEIGHT: 61 IN | RESPIRATION RATE: 18 BRPM

## 2023-12-27 DIAGNOSIS — D64.9 ANEMIA, UNSPECIFIED: ICD-10-CM

## 2023-12-27 PROBLEM — Z23 ENCOUNTER FOR IMMUNIZATION: Status: ACTIVE | Noted: 2020-09-04

## 2023-12-27 LAB
BASOPHILS # BLD AUTO: 0.05 K/UL — SIGNIFICANT CHANGE UP (ref 0–0.2)
BASOPHILS # BLD AUTO: 0.05 K/UL — SIGNIFICANT CHANGE UP (ref 0–0.2)
BASOPHILS NFR BLD AUTO: 0.8 % — SIGNIFICANT CHANGE UP (ref 0–2)
BASOPHILS NFR BLD AUTO: 0.8 % — SIGNIFICANT CHANGE UP (ref 0–2)
EOSINOPHIL # BLD AUTO: 0.2 K/UL — SIGNIFICANT CHANGE UP (ref 0–0.5)
EOSINOPHIL # BLD AUTO: 0.2 K/UL — SIGNIFICANT CHANGE UP (ref 0–0.5)
EOSINOPHIL NFR BLD AUTO: 3.1 % — SIGNIFICANT CHANGE UP (ref 0–6)
EOSINOPHIL NFR BLD AUTO: 3.1 % — SIGNIFICANT CHANGE UP (ref 0–6)
HCT VFR BLD CALC: 40.1 % — SIGNIFICANT CHANGE UP (ref 34.5–45)
HCT VFR BLD CALC: 40.1 % — SIGNIFICANT CHANGE UP (ref 34.5–45)
HGB BLD-MCNC: 12.8 G/DL — SIGNIFICANT CHANGE UP (ref 11.5–15.5)
HGB BLD-MCNC: 12.8 G/DL — SIGNIFICANT CHANGE UP (ref 11.5–15.5)
IMM GRANULOCYTES NFR BLD AUTO: 0.6 % — SIGNIFICANT CHANGE UP (ref 0–0.9)
IMM GRANULOCYTES NFR BLD AUTO: 0.6 % — SIGNIFICANT CHANGE UP (ref 0–0.9)
LYMPHOCYTES # BLD AUTO: 1.54 K/UL — SIGNIFICANT CHANGE UP (ref 1–3.3)
LYMPHOCYTES # BLD AUTO: 1.54 K/UL — SIGNIFICANT CHANGE UP (ref 1–3.3)
LYMPHOCYTES # BLD AUTO: 23.5 % — SIGNIFICANT CHANGE UP (ref 13–44)
LYMPHOCYTES # BLD AUTO: 23.5 % — SIGNIFICANT CHANGE UP (ref 13–44)
MCHC RBC-ENTMCNC: 28.2 PG — SIGNIFICANT CHANGE UP (ref 27–34)
MCHC RBC-ENTMCNC: 28.2 PG — SIGNIFICANT CHANGE UP (ref 27–34)
MCHC RBC-ENTMCNC: 31.9 G/DL — LOW (ref 32–36)
MCHC RBC-ENTMCNC: 31.9 G/DL — LOW (ref 32–36)
MCV RBC AUTO: 88.3 FL — SIGNIFICANT CHANGE UP (ref 80–100)
MCV RBC AUTO: 88.3 FL — SIGNIFICANT CHANGE UP (ref 80–100)
MONOCYTES # BLD AUTO: 0.72 K/UL — SIGNIFICANT CHANGE UP (ref 0–0.9)
MONOCYTES # BLD AUTO: 0.72 K/UL — SIGNIFICANT CHANGE UP (ref 0–0.9)
MONOCYTES NFR BLD AUTO: 11 % — SIGNIFICANT CHANGE UP (ref 2–14)
MONOCYTES NFR BLD AUTO: 11 % — SIGNIFICANT CHANGE UP (ref 2–14)
NEUTROPHILS # BLD AUTO: 4 K/UL — SIGNIFICANT CHANGE UP (ref 1.8–7.4)
NEUTROPHILS # BLD AUTO: 4 K/UL — SIGNIFICANT CHANGE UP (ref 1.8–7.4)
NEUTROPHILS NFR BLD AUTO: 61 % — SIGNIFICANT CHANGE UP (ref 43–77)
NEUTROPHILS NFR BLD AUTO: 61 % — SIGNIFICANT CHANGE UP (ref 43–77)
NRBC # BLD: 0 /100 WBCS — SIGNIFICANT CHANGE UP (ref 0–0)
NRBC # BLD: 0 /100 WBCS — SIGNIFICANT CHANGE UP (ref 0–0)
PLATELET # BLD AUTO: 249 K/UL — SIGNIFICANT CHANGE UP (ref 150–400)
PLATELET # BLD AUTO: 249 K/UL — SIGNIFICANT CHANGE UP (ref 150–400)
RBC # BLD: 4.54 M/UL — SIGNIFICANT CHANGE UP (ref 3.8–5.2)
RBC # BLD: 4.54 M/UL — SIGNIFICANT CHANGE UP (ref 3.8–5.2)
RBC # FLD: 18.6 % — HIGH (ref 10.3–14.5)
RBC # FLD: 18.6 % — HIGH (ref 10.3–14.5)
WBC # BLD: 6.55 K/UL — SIGNIFICANT CHANGE UP (ref 3.8–10.5)
WBC # BLD: 6.55 K/UL — SIGNIFICANT CHANGE UP (ref 3.8–10.5)
WBC # FLD AUTO: 6.55 K/UL — SIGNIFICANT CHANGE UP (ref 3.8–10.5)
WBC # FLD AUTO: 6.55 K/UL — SIGNIFICANT CHANGE UP (ref 3.8–10.5)

## 2023-12-27 PROCEDURE — 99213 OFFICE O/P EST LOW 20 MIN: CPT

## 2023-12-27 RX ORDER — VITAMIN E ACID SUCCINATE 268 MG
TABLET ORAL
Refills: 0 | Status: ACTIVE | COMMUNITY

## 2023-12-27 RX ORDER — BACILLUS COAGULANS/INULIN 1B-250 MG
CAPSULE ORAL
Refills: 0 | Status: ACTIVE | COMMUNITY

## 2023-12-27 RX ORDER — CINACALCET 60 MG/1
60 TABLET ORAL
Refills: 0 | Status: ACTIVE | COMMUNITY
Start: 2023-06-23

## 2023-12-27 RX ORDER — BUMETANIDE 1 MG/1
1 TABLET ORAL
Refills: 0 | Status: ACTIVE | COMMUNITY
Start: 2023-04-04

## 2023-12-27 RX ORDER — FOLIC ACID 1 MG/1
1 TABLET ORAL
Qty: 90 | Refills: 0 | Status: ACTIVE | COMMUNITY
Start: 2023-04-04

## 2023-12-27 RX ORDER — COLCHICINE 0.6 MG/1
0.6 CAPSULE ORAL
Refills: 0 | Status: ACTIVE | COMMUNITY
Start: 2023-06-02

## 2023-12-27 RX ORDER — COLESEVELAM HYDROCHLORIDE 3.75 G/1
3.75 POWDER, FOR SUSPENSION ORAL
Refills: 0 | Status: ACTIVE | COMMUNITY
Start: 2021-04-24

## 2023-12-27 RX ORDER — SPIRONOLACTONE 25 MG/1
25 TABLET ORAL DAILY
Refills: 0 | Status: ACTIVE | COMMUNITY

## 2023-12-27 RX ORDER — CINACALCET 30 MG/1
30 TABLET ORAL
Refills: 0 | Status: DISCONTINUED | COMMUNITY
End: 2023-12-27

## 2023-12-27 RX ORDER — LANSOPRAZOLE 30 MG/1
30 CAPSULE, DELAYED RELEASE ORAL DAILY
Refills: 0 | Status: ACTIVE | COMMUNITY

## 2023-12-27 RX ORDER — PREDNISONE 20 MG/1
20 TABLET ORAL
Qty: 10 | Refills: 0 | Status: DISCONTINUED | COMMUNITY
Start: 2023-10-31 | End: 2023-12-27

## 2023-12-27 RX ORDER — BUDESONIDE AND FORMOTEROL FUMARATE DIHYDRATE 160; 4.5 UG/1; UG/1
160-4.5 AEROSOL RESPIRATORY (INHALATION)
Qty: 10 | Refills: 0 | Status: DISCONTINUED | COMMUNITY
Start: 2023-08-07 | End: 2023-12-27

## 2023-12-27 RX ORDER — TRIAMCINOLONE ACETONIDE 0.25 MG/G
0.03 CREAM TOPICAL
Refills: 0 | Status: ACTIVE | COMMUNITY
Start: 2023-07-28

## 2023-12-27 RX ORDER — COLESEVELAM HYDROCHLORIDE 625 MG/1
625 TABLET, FILM COATED ORAL
Refills: 0 | Status: DISCONTINUED | COMMUNITY
Start: 2018-08-16 | End: 2023-12-27

## 2023-12-27 NOTE — REASON FOR VISIT
[Follow-Up] : a follow-up visit [Asthma] : asthma [Pulmonary Embolism] : pulmonary embolism [Shortness of Breath] : shortness of breath [TextBox_44] : Lab work

## 2023-12-27 NOTE — ASSESSMENT
[FreeTextEntry1] : From pulmonary embolism perspective is doing better  Needs pneumococcal vaccination

## 2023-12-27 NOTE — REVIEW OF SYSTEMS
[SOB on Exertion] : sob on exertion [Negative] : Endocrine [TextBox_94] : Pain and swelling in hand notes thumb is larger

## 2023-12-27 NOTE — HISTORY OF PRESENT ILLNESS
[Never] : never [TextBox_4] : Prior: Patient reports that her dyspnea intermittently worsens and improves from a case to case basis. She also states of having an elevated heart rate. Patient reports that she continues to take Trelegy for asthma treatment.  Still with shortness of breath on exertion does not appear to be fully recovering after her pulmonary embolism.  Last visit: TORI WOOTEN is a 78 year old female, with history of Obstructive sleep apnea, obesity, asthma with COPD, Pulmonary embolism, who presents to the office for follow up evaluation. Patient reports that she is feeling well overall and is has been improving since her last visit. She states that she continues to take Eliquis 2.5 mg twice a day for her history of Pulmonary embolism.  She was hospitalized for evidence of iron deficiency anemia and is receiving iron infusion supplementation.  The dose of Eliquis was reduced.  Overall she is feeling better.  Current: Generally doing much better-Breathing Overall better Has appointment with hematologist later this week

## 2024-01-01 PROBLEM — D64.9 ANEMIA: Status: ACTIVE | Noted: 2023-07-28

## 2024-01-01 NOTE — REASON FOR VISIT
[Initial Consultation] : an initial consultation for [Other: _____] : [unfilled] [Follow-Up Visit] : a follow-up visit for [FreeTextEntry2] : anemia

## 2024-01-01 NOTE — REVIEW OF SYSTEMS
[Fatigue] : fatigue [Recent Change In Weight] : ~T recent weight change [Lower Ext Edema] : lower extremity edema [SOB on Exertion] : shortness of breath during exertion [Diarrhea: Grade 0] : Diarrhea: Grade 0 [Skin Rash] : skin rash [Negative] : Allergic/Immunologic [FreeTextEntry9] : chronic back pain  [de-identified] : eczema

## 2024-01-01 NOTE — ASSESSMENT
[FreeTextEntry1] : 78-year-old woman with asthma/COPD, GERD, hyperlipidemia, hypertension, paroxysmal atrial fibrillation on Eliquis, arthritis, glaucoma, hyperparathyroidism, DURAN on CPAP, diabetes, gout, chronic kidney disease and DVT/PE 3/2023 here for follow-up of anemia. She was given IV Iron X 5 doses while in the hospital. She is currently on Ferrous gluconate tolerating well.  Her last colonoscopy was in 2017 and showed diverticulosis.  Impression- Iron deficiency anemia Unprovoked DVT/PE- hypercoagulable work-up negative. Will remain on Eliquis 2.5mg po BID for PAF.   Plan: Will check CBC, CMP, iron studies, Ferritin.  Recommend GI work-up because of recent iron deficiency anemia. Follow-up with pulmonary as scheduled. Continue Eliquis BID. Will call patient with laboratory results when available.

## 2024-01-01 NOTE — HISTORY OF PRESENT ILLNESS
[de-identified] : Initial Consultation on 2023 Referred by: SABINA Accompanied by: Daughter CC: Anemia  HPI: 78-year-old woman with asthma/COPD, GERD, hyperlipidemia, hypertension, paroxysmal atrial fibrillation, arthritis, glaucoma, hyperparathyroidism, DURAN on CPAP, diabetes, gout, chronic kidney disease and DVT/PE 3/2023 here for evaluation of anemia.  Patient was evaluated in [facility] ED on 2023 for SOB. She was diagnosed with anemia (HGB 8.0, HCT 27.8, MCV 76.0, Iron saturation 5% and Ferritin 15).  She was found to be guaiac negative; no further GI work-up was done.  She was given IV Iron X 5 doses while in the hospital.  She tolerated the IV iron well without any side effects.  She denies any h/o blood transfusions.  She underwent a repeat CTA chest that showed no evidence of PE.  Her most recent CBC on 2023 showed WBC 4.93, HGB 11.2, HCT 38.8, MCV 86.0, RDW 24.5% and ,000.  Recent creatinine was 1.51.  She recalls a h/o iron deficiency anemia during her teenage years because of menorrhagia.  She last took oral iron supplements in 2018; she tolerated them without GI side effects.  Her last colonoscopy was in 2017 and showed diverticulosis.   She was diagnosed with DVT/PE in 3/2023. She reports that she developed SOB around 2022-2022.  She was evaluated by pulmonary and cardiology.  She went to ED on 3/18/2023 for progressing SOB. CTA showed acute bilateral lobar branch PE with right heart strain.  BLE dopplers on 3/19/2023 showed acute DVT in bilateral popliteal and peroneal veins.  She was started on a Heparin drip and transitioned to Eliquis.  She reports that she may have had an injury to the left leg prior to the PE diagnosis but is unclear when this occurred.  She denies any prior illness, prolonged bed rest or car/airplane ride prior to developing DVT/PE. She reports that she had her 4th COVID vaccine on 2022 and her symptoms started 3 weeks later. She had a repeat CTA on 5/10/2023 that showed resolution of the previously seen PE.  She was advised by pulmonary in 2023 to reduce the Eliquis dose to 2.5mg po BID because of worsening anemia and renal failure.  She underwent repeat BLE dopplers on 2023 that still noted DVT in right peroneal vein and LLE DVT resolved.  She remains on Eliquis 2.5mg po BID and follows with pulmonary. From the EMR, it appears that she was diagnosed with paroxysmal atrial fibrillation prior to developing DVT/PE but was not on any anticoagulation for the PAF at that time. No prior hypercoagulable work-up was done.   She c/o fatigue that is improving.  She reports a good appetite with a 40-pound deliberate weight loss since .  She reports EPPS is improving.  She c/o occasional calf pain/swelling.  She c/o chronic back pain.  She reports intermittent eczematous rash on elbows and arms.  She previously saw dermatology and was given prescription creams.  She c/o occasional hemorrhoidal bleeding on toilet tissue.  Patient denies any fever/chills, recent infections, CP, SOB at rest, abdominal pain, n/v/d, black stools. frequent headaches, dizziness, night sweats, swollen glands or new skeletal pain.   Past OB/Gyn: Denies miscarriages.  Her 3rd child  at age 2.5 years old from cerebral palsy.   Hospitalizations: [facility] 3/18/2023- 3/28/2023 BLE DVT/PE.  [facility] 2023- 2023 for anemia.  Given IV iron X 5 doses.    Medications: See List.  Medications reconciled  Allergies: PCN, codeine  Social History: .  Spouse  in 3/2020 likely from COVID. Nephew lives with her. Has two children.  Retired nurse.  Denies smoking or alcohol use.  [place] Parents are from [place]  Eats a regular diet without restrictions.   Family History: Mother , heart failure.  Father , PE after skin graft.  Niece alive, DVT Denies family history of anemia.   Healthcare Maintenance: Primary care doctor- Dr. Winn- last seen 2023. Last colonoscopy- 2017- showed diverticulosis, otherwise normal. Last endoscopy-  Last mammogram- 2022- normal Last gyn exam- last week, normal Pulmonary- Dr. Valenzuela Endocrine- Dr. Barrios Cardiology- Dr. Avalos Nephrology- Dr. Tatum [de-identified] : Follow-up Visit on 12/27/2023: Accompanied by daughter.   Patient seen for initial consultation on 9/28/2023 for anemia secondary to iron deficiency. Today she is here for follow-up.  Last labs showed CBC showed HGB 11.5, HCT 38.6, Ferritin 27, Iron saturation 9%, Creatinine stable 1.60.  She is on Ferrous gluconate daily- tolerating well. She has not seen GI yet for consultation.  Prior DVT/PE- followed by pulmonary.  Hypercoagulable work-up was negative.  History of Paroxysmal atrial fibrillation- remains on Eliquis 2.5mg po BID.  Energy level is improving.  C/o bilateral shoulder pain for a few years- takes Tylenol as needed with only minimal relief in pain.  Occasional SOB is improving.  Upon ambulation, her heart rate increases and oxygen saturation decreases- this is improving slowly.  Patient denies any fever/chills, recent infections, CP, palpitations, abdominal pain, blood in stool, or dizziness.

## 2024-01-01 NOTE — PHYSICAL EXAM
[Obese] : obese [Normal] : normal appearance, no rash, nodules, vesicles, ulcers, erythema [de-identified] : walks with cane [de-identified] : 1+ pitting edema  [de-identified] : No cervical, axillary or inguinal LAD noted

## 2024-01-08 ENCOUNTER — NON-APPOINTMENT (OUTPATIENT)
Age: 79
End: 2024-01-08

## 2024-01-08 LAB
ALBUMIN SERPL ELPH-MCNC: 4.5 G/DL
ALP BLD-CCNC: 84 U/L
ALT SERPL-CCNC: 16 U/L
ANION GAP SERPL CALC-SCNC: 16 MMOL/L
AST SERPL-CCNC: 17 U/L
BILIRUB SERPL-MCNC: 0.3 MG/DL
BUN SERPL-MCNC: 28 MG/DL
CALCIUM SERPL-MCNC: 9.9 MG/DL
CHLORIDE SERPL-SCNC: 106 MMOL/L
CO2 SERPL-SCNC: 19 MMOL/L
CREAT SERPL-MCNC: 1.3 MG/DL
EGFR: 42 ML/MIN/1.73M2
FERRITIN SERPL-MCNC: 81 NG/ML
GLUCOSE SERPL-MCNC: 114 MG/DL
IRON SATN MFR SERPL: 18 %
IRON SERPL-MCNC: 66 UG/DL
POTASSIUM SERPL-SCNC: 4.1 MMOL/L
PROT SERPL-MCNC: 6.8 G/DL
SODIUM SERPL-SCNC: 141 MMOL/L
TIBC SERPL-MCNC: 372 UG/DL
UIBC SERPL-MCNC: 307 UG/DL

## 2024-01-17 NOTE — H&P PST ADULT - PEDAL EDEMA SEVERITY
OB FOLLOW UP  CC- Here for care of pregnancy        Bre Malik is a 24 y.o.  27w1d patient being seen today for her obstetrical follow up. Patient reports no complaints..     Patient undergoing Glucola testing today. She is due for her testing at 9:24am.       MBT: O+  Rhogam:  N/A  28 week packet: reviewed with patient , counseled on fetal movement , breast pump discussed, and childbirth classes reviewed  TDAP: given today  Flu Status: Declines  Ultrasound Today: No    Her prenatal care is complicated by (and status) :    Patient Active Problem List   Diagnosis    Pregnancy         ROS -   Patient Reports : No Problems  Patient Denies: Loss of Fluid, Vaginal Spotting, Vision Changes, Headaches, Nausea , Vomiting , Contractions, and Epigastric pain  Fetal Movement : normal    The additional following portions of the patient's history were reviewed and updated as appropriate: allergies, current medications, past family history, past medical history, past social history, past surgical history, and problem list.    I have reviewed and agree with the HPI, ROS, and historical information as entered above. Susi Louisef, APRN      /68   Wt 98.4 kg (217 lb)   LMP 2023   BMI 36.11 kg/m²         EXAM:     Prenatal Vitals  BP: 112/68  Weight: 98.4 kg (217 lb)   Fetal Heart Rate: +      Fundal Height (cm): 28 cm                  Assessment and Plan    Problem List Items Addressed This Visit          Gravid and     Pregnancy - Primary    Overview     cfDNA low risk          Other Visit Diagnoses       Need for Tdap vaccination        Relevant Orders    Tdap Vaccine Greater Than or Equal To 8yo IM            Pregnancy at 27w1d  1 hr Glucola, CBC, RPR, and antibody screen today  and TDAP given today  Fetal movement/PTL or Labor precautions  Patient is on Prenatal vitamins  Reviewed Pre-eclampsia signs/symptoms  Activity and Exercise discussed.  Return in about 4 weeks (around  2/14/2024).        Susi Stover, APRN  01/17/2024    2+

## 2024-02-20 ENCOUNTER — APPOINTMENT (OUTPATIENT)
Dept: PULMONOLOGY | Facility: CLINIC | Age: 79
End: 2024-02-20
Payer: MEDICARE

## 2024-02-20 VITALS — HEART RATE: 88 BPM | OXYGEN SATURATION: 94 % | DIASTOLIC BLOOD PRESSURE: 81 MMHG | SYSTOLIC BLOOD PRESSURE: 140 MMHG

## 2024-02-20 DIAGNOSIS — G47.33 OBSTRUCTIVE SLEEP APNEA (ADULT) (PEDIATRIC): ICD-10-CM

## 2024-02-20 PROCEDURE — 99214 OFFICE O/P EST MOD 30 MIN: CPT | Mod: 25

## 2024-02-20 PROCEDURE — 94060 EVALUATION OF WHEEZING: CPT

## 2024-02-20 NOTE — PROCEDURE
[FreeTextEntry1] : Spirometry demonstrates interval improvement  Data from PAP device demonstrates excellent compliance AHI 2.3 events per hour current setting minimum pressure 11 maximal pressure of 15 auto CPAP

## 2024-02-20 NOTE — HISTORY OF PRESENT ILLNESS
[TextBox_4] : Follow-up for morbid obesity sleep apnea pulmonary embolism.  Reports inconsistent shortness of breath some days very short of breath other days better.  Notes audible wheezing.

## 2024-02-20 NOTE — ASSESSMENT
[FreeTextEntry1] : Patient is currently on treatment with PAP. Data download confirms excellent compliance. Patient continues to use treatment and is benefiting from it.  Intermittent nature of shortness of breath suggests asthma as etiology of dyspnea although not substantiated by PFT did suggest patient use albuterol when symptomatic.  Continue anticoagulation continue to monitor CBC

## 2024-02-20 NOTE — REASON FOR VISIT
[Follow-Up] : a follow-up visit [Asthma] : asthma [Sleep Apnea] : sleep apnea [Pulmonary Embolism] : pulmonary embolism

## 2024-04-16 ENCOUNTER — APPOINTMENT (OUTPATIENT)
Dept: PULMONOLOGY | Facility: CLINIC | Age: 79
End: 2024-04-16
Payer: MEDICARE

## 2024-04-16 VITALS — SYSTOLIC BLOOD PRESSURE: 131 MMHG | OXYGEN SATURATION: 95 % | DIASTOLIC BLOOD PRESSURE: 78 MMHG | HEART RATE: 87 BPM

## 2024-04-16 DIAGNOSIS — E66.01 MORBID (SEVERE) OBESITY DUE TO EXCESS CALORIES: ICD-10-CM

## 2024-04-16 DIAGNOSIS — J44.89 OTHER SPECIFIED CHRONIC OBSTRUCTIVE PULMONARY DISEASE: ICD-10-CM

## 2024-04-16 PROCEDURE — 99214 OFFICE O/P EST MOD 30 MIN: CPT | Mod: 25

## 2024-04-16 PROCEDURE — 94618 PULMONARY STRESS TESTING: CPT

## 2024-04-16 RX ORDER — SEMAGLUTIDE 0.68 MG/ML
INJECTION, SOLUTION SUBCUTANEOUS
Refills: 0 | Status: ACTIVE | COMMUNITY

## 2024-04-16 RX ORDER — FLUTICASONE FUROATE, UMECLIDINIUM BROMIDE AND VILANTEROL TRIFENATATE 200; 62.5; 25 UG/1; UG/1; UG/1
200-62.5-25 POWDER RESPIRATORY (INHALATION)
Qty: 3 | Refills: 3 | Status: ACTIVE | COMMUNITY
Start: 2020-12-15 | End: 1900-01-01

## 2024-04-16 RX ORDER — FLUTICASONE PROPIONATE 50 UG/1
50 SPRAY, METERED NASAL
Qty: 16 | Refills: 5 | Status: ACTIVE | COMMUNITY
Start: 2021-10-05 | End: 1900-01-01

## 2024-04-16 NOTE — HISTORY OF PRESENT ILLNESS
[TextBox_4] : Follow-up for morbid obesity sleep apnea pulmonary embolism.  Reports inconsistent shortness of breath some days very short of breath other days better.  Does not feel back to baseline prior to pulmonary embolism although shortness of breath definitely improved.  Seen by hematology for hypercoagulable workup reportedly negative.  Continues on half dose Eliquis because of bleeding issues is actually having hemorrhoidal bleeding on an intermittent basis.

## 2024-04-16 NOTE — PROCEDURE
[FreeTextEntry1] : Pulmonary stress test continues to demonstrate oxygen desaturation with exertion with increased heart rate

## 2024-04-16 NOTE — ASSESSMENT
[FreeTextEntry1] : History of chronic airways disease sleep apnea morbid obesity and pulmonary embolism continues to show oxygen desaturation with exertion.  Will discuss with pulmonary hypertension team whether a right heart catheterization is appropriate.  Cannot intensify anticoagulation further because of bleeding issues; by imaging does not have residual clot

## 2024-04-30 ENCOUNTER — NON-APPOINTMENT (OUTPATIENT)
Age: 79
End: 2024-04-30

## 2024-04-30 ENCOUNTER — APPOINTMENT (OUTPATIENT)
Dept: PULMONOLOGY | Facility: CLINIC | Age: 79
End: 2024-04-30
Payer: MEDICARE

## 2024-04-30 DIAGNOSIS — I26.99 OTHER PULMONARY EMBOLISM W/OUT ACUTE COR PULMONALE: ICD-10-CM

## 2024-04-30 PROCEDURE — 99204 OFFICE O/P NEW MOD 45 MIN: CPT

## 2024-04-30 PROCEDURE — G2211 COMPLEX E/M VISIT ADD ON: CPT

## 2024-04-30 NOTE — HISTORY OF PRESENT ILLNESS
[Never] : never [TextBox_4] : 78 yo F w/ hx of HTN, morbid obesity, NIDDM, pAF, hx of PE 3/2023, DURAN on CPAP, ? airway disease, GERD who developed SOB over several months, was found to have B/L PE's 3/2023, TTE unable to visualize RV, but was placed on A/C, readmitted to hospital 5 months later (8/2023) due to recurrence of her EPPS a/w wheezing/cough and tachycardia w/ hypoxia as well as anemia. Readmission felt to be multifactorial 2/2 anemia, asthma, ?CHF. She has been following with pulmonary (Dr. Keita), has had improvement in SOB but still not back to  baseline > 1 year after initial PE. Difficult to evaluate echocardiograms due to morbid obesity, concerned for underlying PH and referred to PH clinic due to continued hypoxia on exertion. Repeat CTAs have demonstrated minimal evidence of CTED (some areas of vascular pruning, but no overt webs or occlusions).   Since her PE last year she has had persistent EPPS which has not improved. Used to be able walk multiple blocks on level surface, but now she is dyspneic after initiating any activity. History of psoriasis and arthritis but no overt rheumatologic conditions. Never smoker, no history of drug use. Remains on eliquis for AC, no adverse effects. No history of parenchymal lung disease or chronic hypoxemic conditions. History of anemia, from hemorrhoidal bleeding per her but has since recovered. No history of diet pills or supplements. Has been on diuretics for a prolonged period of time, does not feel it affects her symptoms.

## 2024-04-30 NOTE — END OF VISIT
[] : Fellow [FreeTextEntry3] : Risk factors for PH, poorly visualized RV on TTE, and chronic EPPS with hx of PE, HTN, obesity, ?HFpEF. Will plan for RHC for definitive hemodynamics for unexplained dyspnea. All questions answered, pt agreeable to procedure next week. Will hold eliquis 48 hrs prior, NPO after midnight. Follow up in clinic 5/17/24 to review results.  [Time Spent: ___ minutes] : I have spent [unfilled] minutes of time on the encounter.

## 2024-04-30 NOTE — REVIEW OF SYSTEMS
[Dyspnea] : dyspnea [SOB on Exertion] : sob on exertion [Frequency] : frequency [Arthralgias] : arthralgias [Negative] : Psychiatric

## 2024-04-30 NOTE — ASSESSMENT
Spoke to pt and let her know [facility] recommended she follow up with someone at [facility]. Pt stated she has an appointment with them next week and they will not give her a new Rx which she needs. Told pt per [facility], she has no appointments today  Pt was insistent and getting upset that she needed to be seen to get a new Rx. Told pt I would ask [facility] again. [FreeTextEntry1] : 78 yo F w/ hx of HTN, morbid obesity, NIDDM, pAF, hx of PE 3/2023, DURAN on CPAP, ? airway disease, GERD who developed SOB over several months. Referred to PH clinic for further evaluation.   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 Given extensive previous workup and poor TTE quality unable to visualize RV well, will opt for definitive dx with RHC which her and her daughter are amenable to.   Plan:  - RHC tentatively next week  - discussed NPO prior to midnight, hold eliquis 48 hrs prior - all other questions answered   RTC after RHC to discuss results.

## 2024-04-30 NOTE — REASON FOR VISIT
[Initial] : an initial visit [Pulmonary Hypertension] : pulmonary hypertension [TextBox_13] : Dr. Valenzuela

## 2024-04-30 NOTE — PROCEDURE
[FreeTextEntry1] : 6MWT 4/2024 201m, SpO2 desaturation to 88%, decrease in distance by 30 meters, worsening hypoxia compared to 10/2023 **** PFT 2/2024 FEV1, FVC, FEV1/FVC are WNL, no BDR, significantly improved from prior.  **** TTE 8/2/2023 CONCLUSIONS: 1. Mitral annular calcification, otherwise normal mitral valve. Minimal mitral regurgitation. 2. Aortic valve leaflet morphology not well visualized. Peak transaortic valve gradient equals 20 mm Hg, mean transaortic valve gradient equals 11 mm Hg, estimated aortic valve area equals 1.8 sq cm (by continuity equation),consistent with mild aortic stenosis. Mild aortic regurgitation. 3. Endocardium not well visualized; grossly normal left ventricular systolic function.  Estimated LVEF in uqi24-42% range (by visual estimate). 4. The right ventricle is not well visualized; grossly normal right ventricular systolic function. *** Compared with echocardiogram of 3/20/2023, no significant changes noted.  *** CTA PE protocol 5/10/2023 IMPRESSION: Interval resolution of the previously seen pulmonary emboli. No acute pulmonary embolism. *** CTA 3/18/2023 Acute pulmonary emboli in the right interlobar artery and right upper, middle, and lower lobar branches. Acute pulmonary emboli within  the left upper and lower lobar branches. Increased size of the right ventricle as compared to the left ventricle, with leftward convexity of the interventricular septum. No pericardial effusion.

## 2024-05-07 VITALS
DIASTOLIC BLOOD PRESSURE: 89 MMHG | OXYGEN SATURATION: 94 % | HEART RATE: 76 BPM | SYSTOLIC BLOOD PRESSURE: 131 MMHG | WEIGHT: 259.93 LBS | TEMPERATURE: 97 F | RESPIRATION RATE: 18 BRPM | HEIGHT: 60 IN

## 2024-05-07 RX ORDER — CHLORHEXIDINE GLUCONATE 213 G/1000ML
1 SOLUTION TOPICAL ONCE
Refills: 0 | Status: DISCONTINUED | OUTPATIENT
Start: 2024-05-09 | End: 2024-05-23

## 2024-05-07 NOTE — H&P ADULT - NSHPLABSRESULTS_GEN_ALL_CORE
12.4   7.32  )-----------( 269      ( 09 May 2024 07:10 )             39.3 12.4   7.32  )-----------( 269      ( 09 May 2024 07:10 )             39.3    05-09    139  |  104  |  32<H>  ----------------------------<  104<H>  4.6   |  22  |  1.78<H>    Ca    10.3      09 May 2024 07:10    TPro  7.3  /  Alb  4.4  /  TBili  0.3  /  DBili  x   /  AST  14  /  ALT  11  /  AlkPhos  93  05-09    PT/INR - ( 09 May 2024 07:10 )   PT: 10.6 sec;   INR: 0.93          PTT - ( 09 May 2024 07:10 )  PTT:32.4 sec

## 2024-05-07 NOTE — H&P ADULT - NS PANP COMMENT GEN_ALL_CORE FT
80 yo F with sign RF for PH as above, persistent EPPS of unclear etiology, unable to visualize RV on TTE but high clinical suspicion for PH. Plan for RHC to evaluate hemodynamics and possible etiology of dyspnea. Vitals stable, exam as above. Eliquis held x 48 hrs, all questions answered. Proceed with RHC, RIJ access, possible vasoreactivity and/or fluid challenge.

## 2024-05-07 NOTE — H&P ADULT - ASSESSMENT
79F with hx of morbid obesity, HTN, HLD, DM2, pAfib (on Eliquis, last dose 5/6/2 PM), B/L PE's (3/2023), DURAN (on CPAP), GERD, and recent hospital admission @ [facility] 8/2-8/7/23 for EPPS thought to be 2/2 anemia, asthma and ?CHF, who presented to Dr. Vora for evaluation of possible pulmonary hypertension given pt w/ continued SOB with minimal activity for over 1 yr since diagnosed with PE's. In light of pt's risk factors, h/o PE and hypoxia pt is referred to [facility] for right heart catheterization to evaluate for possible pulmonary hypertension, WHO group II, III and IV disease.     - VSS; denies bleeding, GI bleeding, hematemesis, hematuria, BRBPR or melena  - ASA II, Mallampati III  - Sedation Type: Moderate   - Is Patient a good candidate for sedation: YES  - H 12.4 / H 39.3 -- AC: Eliquis 2.5mg BID; Pt endorses compliance (last dose 5/16/24 PM)   - Fluids: None given RHC   - NPO except sips with meds    Risks & benefits of procedure and alternative therapy have been explained to the patient including but not limited to: allergic reaction, bleeding w/possible need for blood transfusion, infection, renal and vascular compromise, limb damage, arrhythmia, stroke, vessel dissection/perforation, Myocardial infarction, emergent CABG. Informed consent obtained and in chart.

## 2024-05-09 ENCOUNTER — OUTPATIENT (OUTPATIENT)
Dept: OUTPATIENT SERVICES | Facility: HOSPITAL | Age: 79
LOS: 1 days | Discharge: HOME | End: 2024-05-09
Payer: MEDICARE

## 2024-05-09 DIAGNOSIS — Z90.721 ACQUIRED ABSENCE OF OVARIES, UNILATERAL: Chronic | ICD-10-CM

## 2024-05-09 DIAGNOSIS — Z98.891 HISTORY OF UTERINE SCAR FROM PREVIOUS SURGERY: Chronic | ICD-10-CM

## 2024-05-09 DIAGNOSIS — Z96.659 PRESENCE OF UNSPECIFIED ARTIFICIAL KNEE JOINT: Chronic | ICD-10-CM

## 2024-05-09 DIAGNOSIS — Z98.890 OTHER SPECIFIED POSTPROCEDURAL STATES: Chronic | ICD-10-CM

## 2024-05-09 LAB
A1C WITH ESTIMATED AVERAGE GLUCOSE RESULT: 5.8 % — HIGH (ref 4–5.6)
ALBUMIN SERPL ELPH-MCNC: 4.4 G/DL — SIGNIFICANT CHANGE UP (ref 3.3–5)
ALP SERPL-CCNC: 93 U/L — SIGNIFICANT CHANGE UP (ref 40–120)
ALT FLD-CCNC: 11 U/L — SIGNIFICANT CHANGE UP (ref 10–45)
ANION GAP SERPL CALC-SCNC: 13 MMOL/L — SIGNIFICANT CHANGE UP (ref 5–17)
APTT BLD: 32.4 SEC — SIGNIFICANT CHANGE UP (ref 24.5–35.6)
AST SERPL-CCNC: 14 U/L — SIGNIFICANT CHANGE UP (ref 10–40)
BASOPHILS # BLD AUTO: 0.05 K/UL — SIGNIFICANT CHANGE UP (ref 0–0.2)
BASOPHILS NFR BLD AUTO: 0.7 % — SIGNIFICANT CHANGE UP (ref 0–2)
BILIRUB SERPL-MCNC: 0.3 MG/DL — SIGNIFICANT CHANGE UP (ref 0.2–1.2)
BUN SERPL-MCNC: 32 MG/DL — HIGH (ref 7–23)
CALCIUM SERPL-MCNC: 10.3 MG/DL — SIGNIFICANT CHANGE UP (ref 8.4–10.5)
CHLORIDE SERPL-SCNC: 104 MMOL/L — SIGNIFICANT CHANGE UP (ref 96–108)
CHOLEST SERPL-MCNC: 202 MG/DL — HIGH
CK MB CFR SERPL CALC: 1.2 NG/ML — SIGNIFICANT CHANGE UP (ref 0–6.7)
CK SERPL-CCNC: 69 U/L — SIGNIFICANT CHANGE UP (ref 25–170)
CK SERPL-CCNC: 80 U/L — SIGNIFICANT CHANGE UP (ref 25–170)
CO2 SERPL-SCNC: 22 MMOL/L — SIGNIFICANT CHANGE UP (ref 22–31)
COHGB MFR BLDV: 1.3 % — SIGNIFICANT CHANGE UP
CREAT SERPL-MCNC: 1.78 MG/DL — HIGH (ref 0.5–1.3)
EGFR: 28 ML/MIN/1.73M2 — LOW
EOSINOPHIL # BLD AUTO: 0.14 K/UL — SIGNIFICANT CHANGE UP (ref 0–0.5)
EOSINOPHIL NFR BLD AUTO: 1.9 % — SIGNIFICANT CHANGE UP (ref 0–6)
ESTIMATED AVERAGE GLUCOSE: 120 MG/DL — HIGH (ref 68–114)
GLUCOSE BLDC GLUCOMTR-MCNC: 101 MG/DL — HIGH (ref 70–99)
GLUCOSE SERPL-MCNC: 104 MG/DL — HIGH (ref 70–99)
HCT VFR BLD CALC: 39.3 % — SIGNIFICANT CHANGE UP (ref 34.5–45)
HDLC SERPL-MCNC: 62 MG/DL — SIGNIFICANT CHANGE UP
HGB BLD CALC-MCNC: 11.7 G/DL — SIGNIFICANT CHANGE UP (ref 11.7–16.1)
HGB BLD CALC-MCNC: 11.8 G/DL — SIGNIFICANT CHANGE UP (ref 11.7–16.1)
HGB BLD CALC-MCNC: 11.8 G/DL — SIGNIFICANT CHANGE UP (ref 11.7–16.1)
HGB BLD-MCNC: 12.4 G/DL — SIGNIFICANT CHANGE UP (ref 11.5–15.5)
IMM GRANULOCYTES NFR BLD AUTO: 0.4 % — SIGNIFICANT CHANGE UP (ref 0–0.9)
INR BLD: 0.93 — SIGNIFICANT CHANGE UP (ref 0.85–1.18)
LIPID PNL WITH DIRECT LDL SERPL: 103 MG/DL — HIGH
LYMPHOCYTES # BLD AUTO: 1.94 K/UL — SIGNIFICANT CHANGE UP (ref 1–3.3)
LYMPHOCYTES # BLD AUTO: 26.5 % — SIGNIFICANT CHANGE UP (ref 13–44)
MCHC RBC-ENTMCNC: 30.1 PG — SIGNIFICANT CHANGE UP (ref 27–34)
MCHC RBC-ENTMCNC: 31.6 GM/DL — LOW (ref 32–36)
MCV RBC AUTO: 95.4 FL — SIGNIFICANT CHANGE UP (ref 80–100)
METHGB MFR BLDV: 0 % — SIGNIFICANT CHANGE UP
METHGB MFR BLDV: 0.1 % — SIGNIFICANT CHANGE UP
METHGB MFR BLDV: 0.1 % — SIGNIFICANT CHANGE UP
MONOCYTES # BLD AUTO: 0.69 K/UL — SIGNIFICANT CHANGE UP (ref 0–0.9)
MONOCYTES NFR BLD AUTO: 9.4 % — SIGNIFICANT CHANGE UP (ref 2–14)
NEUTROPHILS # BLD AUTO: 4.47 K/UL — SIGNIFICANT CHANGE UP (ref 1.8–7.4)
NEUTROPHILS NFR BLD AUTO: 61.1 % — SIGNIFICANT CHANGE UP (ref 43–77)
NON HDL CHOLESTEROL: 140 MG/DL — HIGH
NRBC # BLD: 0 /100 WBCS — SIGNIFICANT CHANGE UP (ref 0–0)
PLATELET # BLD AUTO: 269 K/UL — SIGNIFICANT CHANGE UP (ref 150–400)
POTASSIUM SERPL-MCNC: 4.6 MMOL/L — SIGNIFICANT CHANGE UP (ref 3.5–5.3)
POTASSIUM SERPL-SCNC: 4.6 MMOL/L — SIGNIFICANT CHANGE UP (ref 3.5–5.3)
PROT SERPL-MCNC: 7.3 G/DL — SIGNIFICANT CHANGE UP (ref 6–8.3)
PROTHROM AB SERPL-ACNC: 10.6 SEC — SIGNIFICANT CHANGE UP (ref 9.5–13)
RBC # BLD: 4.12 M/UL — SIGNIFICANT CHANGE UP (ref 3.8–5.2)
RBC # FLD: 13.6 % — SIGNIFICANT CHANGE UP (ref 10.3–14.5)
SAO2 % BLDV: 66 % — LOW (ref 67–88)
SAO2 % BLDV: 67 % — SIGNIFICANT CHANGE UP (ref 67–88)
SAO2 % BLDV: 71 % — SIGNIFICANT CHANGE UP (ref 67–88)
SODIUM SERPL-SCNC: 139 MMOL/L — SIGNIFICANT CHANGE UP (ref 135–145)
TRIGL SERPL-MCNC: 220 MG/DL — HIGH
WBC # BLD: 7.32 K/UL — SIGNIFICANT CHANGE UP (ref 3.8–10.5)
WBC # FLD AUTO: 7.32 K/UL — SIGNIFICANT CHANGE UP (ref 3.8–10.5)

## 2024-05-09 PROCEDURE — C1889: CPT

## 2024-05-09 PROCEDURE — 82803 BLOOD GASES ANY COMBINATION: CPT

## 2024-05-09 PROCEDURE — 36415 COLL VENOUS BLD VENIPUNCTURE: CPT

## 2024-05-09 PROCEDURE — 99152 MOD SED SAME PHYS/QHP 5/>YRS: CPT | Mod: GC

## 2024-05-09 PROCEDURE — 85025 COMPLETE CBC W/AUTO DIFF WBC: CPT

## 2024-05-09 PROCEDURE — 93451 RIGHT HEART CATH: CPT | Mod: 26,GC

## 2024-05-09 PROCEDURE — 80053 COMPREHEN METABOLIC PANEL: CPT

## 2024-05-09 PROCEDURE — 85730 THROMBOPLASTIN TIME PARTIAL: CPT

## 2024-05-09 PROCEDURE — 93005 ELECTROCARDIOGRAM TRACING: CPT

## 2024-05-09 PROCEDURE — 82550 ASSAY OF CK (CPK): CPT

## 2024-05-09 PROCEDURE — 85610 PROTHROMBIN TIME: CPT

## 2024-05-09 PROCEDURE — 93010 ELECTROCARDIOGRAM REPORT: CPT

## 2024-05-09 PROCEDURE — 83036 HEMOGLOBIN GLYCOSYLATED A1C: CPT

## 2024-05-09 PROCEDURE — 82553 CREATINE MB FRACTION: CPT

## 2024-05-09 PROCEDURE — 82962 GLUCOSE BLOOD TEST: CPT

## 2024-05-09 PROCEDURE — 80061 LIPID PANEL: CPT

## 2024-05-09 PROCEDURE — 93451 RIGHT HEART CATH: CPT

## 2024-05-09 PROCEDURE — C1894: CPT

## 2024-05-09 RX ORDER — VITAMIN E 100 UNIT
1 CAPSULE ORAL
Refills: 0 | DISCHARGE

## 2024-05-09 RX ORDER — APIXABAN 2.5 MG/1
1 TABLET, FILM COATED ORAL
Refills: 0 | DISCHARGE

## 2024-05-09 RX ORDER — SPIRONOLACTONE 25 MG/1
1 TABLET, FILM COATED ORAL
Qty: 0 | Refills: 0 | DISCHARGE

## 2024-05-09 RX ORDER — BRIMONIDINE TARTRATE 2 MG/MG
1 SOLUTION/ DROPS OPHTHALMIC
Refills: 0 | DISCHARGE

## 2024-05-09 RX ORDER — ERGOCALCIFEROL 1.25 MG/1
1 CAPSULE ORAL
Refills: 0 | DISCHARGE

## 2024-05-09 RX ORDER — MONTELUKAST 4 MG/1
1 TABLET, CHEWABLE ORAL
Refills: 0 | DISCHARGE

## 2024-05-09 RX ORDER — BIMATOPROST 0.3 MG/ML
1 SOLUTION/ DROPS OPHTHALMIC
Refills: 0 | DISCHARGE

## 2024-05-09 RX ORDER — CINACALCET 30 MG/1
1 TABLET, FILM COATED ORAL
Refills: 0 | DISCHARGE

## 2024-05-09 RX ORDER — METFORMIN HYDROCHLORIDE 850 MG/1
2 TABLET ORAL
Refills: 0 | DISCHARGE

## 2024-05-09 RX ORDER — LANSOPRAZOLE 15 MG/1
1 CAPSULE, DELAYED RELEASE ORAL
Refills: 0 | DISCHARGE

## 2024-05-09 RX ORDER — ALLOPURINOL 300 MG
1 TABLET ORAL
Refills: 0 | DISCHARGE

## 2024-05-09 RX ORDER — FLUTICASONE PROPIONATE 220 MCG
2 AEROSOL WITH ADAPTER (GRAM) INHALATION
Refills: 0 | DISCHARGE

## 2024-05-09 RX ORDER — FLUTICASONE FUROATE, UMECLIDINIUM BROMIDE AND VILANTEROL TRIFENATATE 200; 62.5; 25 UG/1; UG/1; UG/1
1 POWDER RESPIRATORY (INHALATION)
Refills: 0 | DISCHARGE

## 2024-05-09 NOTE — PROGRESS NOTE ADULT - SUBJECTIVE AND OBJECTIVE BOX
Interventional Cardiology  Post  Diagnostic Catheterization  Discharge Note      Patient without complaints. Ambulated and voided without difficulties    Afebrile, VSS    Ext:    		Right/Left             Groin:       hematoma,     bruit, dressing; C/D/I  		Right/Left              Radial :    hematoma,     bleeding, dressing; C/D/I      Pulses:    intact RAD/DP/PT to baseline     A/P:  s/p PCI: PTCA/CSI Atherectomy/Rotational Atherectomy/BMS/MANNY    1.	Follow-up with PMD/Cardiologist ___________ in 1week.   2.                 Pt given instructions on importance of post groin/radial access site care. .    3. 	Stable for discharge as per attending . _________ after bed rest, pt voids, groin/wrist stable and 30 minutes of ambulation.  4.     Prescriptions for       Interventional Cardiology  Post  Diagnostic Catheterization  Discharge Note      Patient without complaints. Ambulated and voided without difficulties    Afebrile, VSS    Ext:    		Right IJ , dressing; C/D/I      Pulses:    intact RAD/DP/PT to baseline     A/P:  Shown to have mild pulmonary HTN    1.	Follow-up with PMD/Cardiologist Diony in 1week.   2.                 Pt given instructions on importance of post groin/radial access site care. .    3. 	Stable for discharge as per attending Dr. Vora after bed rest, pt voids, groin/wrist stable and 30 minutes of ambulation.

## 2024-05-13 DIAGNOSIS — I27.20 PULMONARY HYPERTENSION, UNSPECIFIED: ICD-10-CM

## 2024-05-16 NOTE — HISTORY OF PRESENT ILLNESS
[de-identified] : 77 y.o. F. presents today for her annual check-up S/P Left TKR done on 4/18/19 and Right TKR 03/01/12. Patient states that her knees are functioning well but she does have problem with her balance. She does use the cane for ambulation with help. 
Time-based billing (NON-critical care)

## 2024-05-17 ENCOUNTER — APPOINTMENT (OUTPATIENT)
Dept: PULMONOLOGY | Facility: CLINIC | Age: 79
End: 2024-05-17
Payer: MEDICARE

## 2024-05-17 VITALS
HEIGHT: 61 IN | HEART RATE: 84 BPM | WEIGHT: 259 LBS | TEMPERATURE: 97.7 F | DIASTOLIC BLOOD PRESSURE: 85 MMHG | SYSTOLIC BLOOD PRESSURE: 138 MMHG | OXYGEN SATURATION: 95 % | BODY MASS INDEX: 48.9 KG/M2

## 2024-05-17 PROCEDURE — G2211 COMPLEX E/M VISIT ADD ON: CPT

## 2024-05-17 PROCEDURE — 99214 OFFICE O/P EST MOD 30 MIN: CPT

## 2024-05-17 RX ORDER — SILDENAFIL 20 MG/1
20 TABLET ORAL
Qty: 90 | Refills: 5 | Status: ACTIVE | COMMUNITY
Start: 2024-05-17 | End: 1900-01-01

## 2024-05-17 NOTE — HISTORY OF PRESENT ILLNESS
[Never] : never [TextBox_4] : 80 yo F w/ hx of HTN, morbid obesity, NIDDM, pAF, hx of PE 3/2023, DURAN on CPAP, ? airway disease, GERD who developed SOB over several months, was found to have B/L PE's 3/2023, TTE unable to visualize RV, but was placed on A/C, readmitted to hospital 5 months later (8/2023) due to recurrence of her EPPS a/w wheezing/cough and tachycardia w/ hypoxia as well as anemia. Readmission felt to be multifactorial 2/2 anemia, asthma, ?CHF. She has been following with pulmonary (Dr. Keita), has had improvement in SOB but still not back to  baseline > 1 year after initial PE. Difficult to evaluate echocardiograms due to morbid obesity, concerned for underlying PH and referred to PH clinic due to continued hypoxia on exertion. Repeat CTAs have demonstrated minimal evidence of CTED (some areas of vascular pruning, but no overt webs or occlusions). Exercise capacity severely diminished from last year, now limited even with minimal activity.  Never smoker, no history of drug use. Remains on eliquis for AC, no adverse effects. No history of parenchymal lung disease or chronic hypoxemic conditions. History of anemia, from hemorrhoidal bleeding per her but has since recovered. No history of diet pills or supplements. Has been on diuretics for a prolonged period of time, does not feel it affects her symptoms. Decision made to pursue RHC as TTE unable to visualize RV and concern for PH.  RHC 5/9/24   PA=44/19/25  WP=9  PA sat 66% Ao sat 92%  CO/CI (Td) 4.63/2.22 , CO/CI (F) 4.75/2.28  TPG = 16, DPG= 10  PVR= 3.46 PVRi 7.2  Isolated precapillary pulmonary hypertension present despite adequate treatment of WHO Group II and III risk factors suggesting possible idiopathic PAH WHO Group I disease.  5/17/24 Here to review RHC results. No issues after RHC - had some skin reaction to tegederm, but no neck swelling/tenderness, no bleeding, no pain. Continues to have dyspnea with minimal exertion, reviewed results of RHC with patient and daughter in detail.

## 2024-05-17 NOTE — PROCEDURE
[FreeTextEntry1] : 5/9/24 RHC Baseline RHC:  RA=8  RV=46/8  PA=44/19/25  WP=9  PA sat 66% Ao sat 92%  CO/CI (Td) 4.63/2.22 , CO/CI (F) 4.75/2.28  TPG = 16, DPG= 10  PVR= 3.46 PVRi 7.2   SVR= 21WU (1722 dynes)  PVR/SVR= 0.16  SV = 57.1  SVi =27.4  Oumar=3.125   Impression: Isolated precapillary pulmonary hypertension present despite adequate treatment of WHO Group II and III risk factors suggestiong possible idiopathic PAH WHO Group I disease. However, pt with significantly elevated SVR. Despite low PAWP, she remains at elevated risk for Group 2 PH which may be unmasked with vasodilatory agents  Recommendations:   Possible idiopathic PAH vs. Group II/III PH with normal PAWP in setting of euvolemia. Given severity of symptoms will cautiously treat with monotherapy, sildenafil, and close follow up in PH clinic 6MWT 4/2024 201m, SpO2 desaturation to 88%, decrease in distance by 30 meters, worsening hypoxia compared to 10/2023 **** PFT 2/2024 FEV1, FVC, FEV1/FVC are WNL, no BDR, significantly improved from prior.  **** TTE 8/2/2023 CONCLUSIONS: 1. Mitral annular calcification, otherwise normal mitral valve. Minimal mitral regurgitation. 2. Aortic valve leaflet morphology not well visualized. Peak transaortic valve gradient equals 20 mm Hg, mean transaortic valve gradient equals 11 mm Hg, estimated aortic valve area equals 1.8 sq cm (by continuity equation),consistent with mild aortic stenosis. Mild aortic regurgitation. 3. Endocardium not well visualized; grossly normal left ventricular systolic function.  Estimated LVEF in ddr97-61% range (by visual estimate). 4. The right ventricle is not well visualized; grossly normal right ventricular systolic function. *** Compared with echocardiogram of 3/20/2023, no significant changes noted.  *** CTA PE protocol 5/10/2023 IMPRESSION: Interval resolution of the previously seen pulmonary emboli. No acute pulmonary embolism. *** CTA 3/18/2023 Acute pulmonary emboli in the right interlobar artery and right upper, middle, and lower lobar branches. Acute pulmonary emboli within  the left upper and lower lobar branches. Increased size of the right ventricle as compared to the left ventricle, with leftward convexity of the interventricular septum. No pericardial effusion.

## 2024-05-17 NOTE — ASSESSMENT
[FreeTextEntry1] : 80 yo F w/ hx of HTN, morbid obesity, NIDDM, pAF, hx of PE 3/2023, DURAN on CPAP, ? airway disease, GERD who developed SOB over several months. 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    #Precapillary PH - Despite her significant RF for group II disease, her hemodynamics are all consistent with precapillary disease, PAWP 9mmHg (not even borderline). Suspect there still may be a component of obesity and DURAN, but she has had significant weight LOSS over the last few years and is on CPAP therapy. Despite hx of PE there is no evidence to suggest CTEPD, may have microvascular disease.   Will consider as idiopathic PAH i/s/o hemodynamics that seem out of proportion to underlying group II and III risk factors, but remains at increased risk for pulmonary edema or worsening symptoms on vasodilator therapy. Will plan to cautiously initiate sildenafil - to start 10mg TID for 3-4 days and then, if pulse ox and BP stable and no worsening of dyspnea, can increase to 20mg TID. Telehealth visit with AI Sandoval, in 2 weeks and RTC in 3 months to reassess.   Plan:  - Start sildenafil 10mg TID, if tolerating can increase to 20mg TID in 3-4 days - Telehealth with AI Sandoval 2 weeks - RTC 3 months - Discussed monitoring for side effects. STOP medication of pt becomes hypoxic, hypotensive, or develops worsening SOB.

## 2024-05-17 NOTE — REASON FOR VISIT
[Follow-Up] : a follow-up visit [Pulmonary Hypertension] : pulmonary hypertension [TextBox_13] : Dr. Valenzuela

## 2024-05-25 ENCOUNTER — NON-APPOINTMENT (OUTPATIENT)
Age: 79
End: 2024-05-25

## 2024-05-26 RX ORDER — MONTELUKAST 10 MG/1
10 TABLET, FILM COATED ORAL
Qty: 90 | Refills: 2 | Status: ACTIVE | COMMUNITY
Start: 2024-05-26 | End: 1900-01-01

## 2024-05-28 PROBLEM — I27.20 PULMONARY HYPERTENSION: Status: ACTIVE | Noted: 2024-05-16

## 2024-05-31 ENCOUNTER — APPOINTMENT (OUTPATIENT)
Dept: PULMONOLOGY | Facility: CLINIC | Age: 79
End: 2024-05-31
Payer: MEDICARE

## 2024-05-31 ENCOUNTER — NON-APPOINTMENT (OUTPATIENT)
Age: 79
End: 2024-05-31

## 2024-05-31 VITALS — DIASTOLIC BLOOD PRESSURE: 82 MMHG | SYSTOLIC BLOOD PRESSURE: 118 MMHG | OXYGEN SATURATION: 95 % | HEART RATE: 77 BPM

## 2024-05-31 DIAGNOSIS — R06.02 SHORTNESS OF BREATH: ICD-10-CM

## 2024-05-31 DIAGNOSIS — I27.20 PULMONARY HYPERTENSION, UNSPECIFIED: ICD-10-CM

## 2024-05-31 PROCEDURE — 99212 OFFICE O/P EST SF 10 MIN: CPT

## 2024-05-31 NOTE — ASSESSMENT
[FreeTextEntry1] : 78 yo F w/ hx of HTN, morbid obesity, NIDDM, pAF, hx of PE 3/2023, DURAN on CPAP, ? airway disease, GERD who developed SOB over several months. 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    #Precapillary PH - Despite her significant RF for group II disease, her hemodynamics are all consistent with precapillary disease, PAWP 9mmHg (not even borderline). Suspect there still may be a component of obesity and DURAN, but she has had significant weight LOSS over the last few years and is on CPAP therapy. Despite hx of PE there is no evidence to suggest CTEPD, may have microvascular disease.   Will consider as idiopathic PAH i/s/o hemodynamics that seem out of proportion to underlying group II and III risk factors, but remains at increased risk for pulmonary edema or worsening symptoms on vasodilator therapy. Will plan to cautiously initiate sildenafil - so far she is tolerating 20 mg TID and actually endorses an improvement in her breathing and activity tolerance.  Plan:  - Continue sildenafil 20mg TID  - Discussed monitoring for side effects. STOP medication of pt becomes hypoxic, hypotensive, or develops worsening SOB. Counseled to call with wt gain of 3 pounds in 24 h or 5 pounds in 1 week - RTC August

## 2024-05-31 NOTE — PROCEDURE
[FreeTextEntry1] : 5/9/24 RHC Baseline RHC:  RA=8  RV=46/8  PA=44/19/25  WP=9  PA sat 66% Ao sat 92%  CO/CI (Td) 4.63/2.22 , CO/CI (F) 4.75/2.28  TPG = 16, DPG= 10  PVR= 3.46 PVRi 7.2   SVR= 21WU (1722 dynes)  PVR/SVR= 0.16  SV = 57.1  SVi =27.4  Oumar=3.125   Impression: Isolated precapillary pulmonary hypertension present despite adequate treatment of WHO Group II and III risk factors suggestiong possible idiopathic PAH WHO Group I disease. However, pt with significantly elevated SVR. Despite low PAWP, she remains at elevated risk for Group 2 PH which may be unmasked with vasodilatory agents  Recommendations:   Possible idiopathic PAH vs. Group II/III PH with normal PAWP in setting of euvolemia. Given severity of symptoms will cautiously treat with monotherapy, sildenafil, and close follow up in PH clinic 6MWT 4/2024 201m, SpO2 desaturation to 88%, decrease in distance by 30 meters, worsening hypoxia compared to 10/2023 **** PFT 2/2024 FEV1, FVC, FEV1/FVC are WNL, no BDR, significantly improved from prior.  **** TTE 8/2/2023 CONCLUSIONS: 1. Mitral annular calcification, otherwise normal mitral valve. Minimal mitral regurgitation. 2. Aortic valve leaflet morphology not well visualized. Peak transaortic valve gradient equals 20 mm Hg, mean transaortic valve gradient equals 11 mm Hg, estimated aortic valve area equals 1.8 sq cm (by continuity equation),consistent with mild aortic stenosis. Mild aortic regurgitation. 3. Endocardium not well visualized; grossly normal left ventricular systolic function.  Estimated LVEF in kvd31-65% range (by visual estimate). 4. The right ventricle is not well visualized; grossly normal right ventricular systolic function. *** Compared with echocardiogram of 3/20/2023, no significant changes noted.  *** CTA PE protocol 5/10/2023 IMPRESSION: Interval resolution of the previously seen pulmonary emboli. No acute pulmonary embolism. *** CTA 3/18/2023 Acute pulmonary emboli in the right interlobar artery and right upper, middle, and lower lobar branches. Acute pulmonary emboli within  the left upper and lower lobar branches. Increased size of the right ventricle as compared to the left ventricle, with leftward convexity of the interventricular septum. No pericardial effusion.

## 2024-06-07 NOTE — PROGRESS NOTE ADULT - PROBLEM SELECTOR PLAN 6
pts bp acceptable at present without htn meds
show
pts bp acceptable at present without htn meds

## 2024-06-20 ENCOUNTER — NON-APPOINTMENT (OUTPATIENT)
Age: 79
End: 2024-06-20

## 2024-07-26 NOTE — HISTORY OF PRESENT ILLNESS
[Never] : never [TextBox_4] : 80 yo F w/ hx of HTN, morbid obesity, NIDDM, pAF, hx of PE 3/2023, DURAN on CPAP, ? airway disease, GERD who developed SOB over several months, was found to have B/L PE's 3/2023, TTE unable to visualize RV, but was placed on A/C, readmitted to hospital 5 months later (8/2023) due to recurrence of her EPPS a/w wheezing/cough and tachycardia w/ hypoxia as well as anemia. Readmission felt to be multifactorial 2/2 anemia, asthma, ?CHF. She has been following with pulmonary (Dr. Keita), has had improvement in SOB but still not back to  baseline > 1 year after initial PE. Difficult to evaluate echocardiograms due to morbid obesity, concerned for underlying PH and referred to PH clinic due to continued hypoxia on exertion. Repeat CTAs have demonstrated minimal evidence of CTED (some areas of vascular pruning, but no overt webs or occlusions). Exercise capacity severely diminished from last year, now limited even with minimal activity.  Never smoker, no history of drug use. Remains on eliquis for AC, no adverse effects. No history of parenchymal lung disease or chronic hypoxemic conditions. History of anemia, from hemorrhoidal bleeding per her but has since recovered. No history of diet pills or supplements. Has been on diuretics for a prolonged period of time, does not feel it affects her symptoms. Decision made to pursue RHC as TTE unable to visualize RV and concern for PH.  RHC 5/9/24   PA=44/19/25  WP=9  PA sat 66% Ao sat 92%  CO/CI (Td) 4.63/2.22 , CO/CI (F) 4.75/2.28  TPG = 16, DPG= 10  PVR= 3.46 PVRi 7.2  Isolated precapillary pulmonary hypertension present despite adequate treatment of WHO Group II and III risk factors suggesting possible idiopathic PAH WHO Group I disease.  PH Regimen sildenafil 20 mg TID [started May 2024]  8-7-24 5-31-24 She feels better on the sildenafil. There is less tightness in her chest, breathing is easier. She is not as out of breath on stairs and is less fatigued in the morning doing her ADLs. BP has been 110 -133, 74 - 79  Weight stable.

## 2024-07-26 NOTE — PROCEDURE
[FreeTextEntry1] : 5/9/24 RHC Baseline RHC:  RA=8  RV=46/8  PA=44/19/25  WP=9  PA sat 66% Ao sat 92%  CO/CI (Td) 4.63/2.22 , CO/CI (F) 4.75/2.28  TPG = 16, DPG= 10  PVR= 3.46 PVRi 7.2   SVR= 21WU (1722 dynes)  PVR/SVR= 0.16  SV = 57.1  SVi =27.4  Oumar=3.125   Impression: Isolated precapillary pulmonary hypertension present despite adequate treatment of WHO Group II and III risk factors suggestiong possible idiopathic PAH WHO Group I disease. However, pt with significantly elevated SVR. Despite low PAWP, she remains at elevated risk for Group 2 PH which may be unmasked with vasodilatory agents  Recommendations:   Possible idiopathic PAH vs. Group II/III PH with normal PAWP in setting of euvolemia. Given severity of symptoms will cautiously treat with monotherapy, sildenafil, and close follow up in PH clinic 6MWT 4/2024 201m, SpO2 desaturation to 88%, decrease in distance by 30 meters, worsening hypoxia compared to 10/2023 **** PFT 2/2024 FEV1, FVC, FEV1/FVC are WNL, no BDR, significantly improved from prior.  **** TTE 8/2/2023 CONCLUSIONS: 1. Mitral annular calcification, otherwise normal mitral valve. Minimal mitral regurgitation. 2. Aortic valve leaflet morphology not well visualized. Peak transaortic valve gradient equals 20 mm Hg, mean transaortic valve gradient equals 11 mm Hg, estimated aortic valve area equals 1.8 sq cm (by continuity equation),consistent with mild aortic stenosis. Mild aortic regurgitation. 3. Endocardium not well visualized; grossly normal left ventricular systolic function.  Estimated LVEF in pye93-36% range (by visual estimate). 4. The right ventricle is not well visualized; grossly normal right ventricular systolic function. *** Compared with echocardiogram of 3/20/2023, no significant changes noted.  *** CTA PE protocol 5/10/2023 IMPRESSION: Interval resolution of the previously seen pulmonary emboli. No acute pulmonary embolism. *** CTA 3/18/2023 Acute pulmonary emboli in the right interlobar artery and right upper, middle, and lower lobar branches. Acute pulmonary emboli within  the left upper and lower lobar branches. Increased size of the right ventricle as compared to the left ventricle, with leftward convexity of the interventricular septum. No pericardial effusion.

## 2024-07-26 NOTE — ASSESSMENT
[FreeTextEntry1] : 80 yo F w/ hx of HTN, morbid obesity, NIDDM, pAF, hx of PE 3/2023, DURAN on CPAP, ? airway disease, GERD who developed SOB over several months. 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    #Precapillary PH - Despite her significant RF for group II disease, her hemodynamics are all consistent with precapillary disease, PAWP 9mmHg (not even borderline). Suspect there still may be a component of obesity and DURAN, but she has had significant weight LOSS over the last few years and is on CPAP therapy. Despite hx of PE there is no evidence to suggest CTEPD, may have microvascular disease.   Will consider as idiopathic PAH i/s/o hemodynamics that seem out of proportion to underlying group II and III risk factors, but remains at increased risk for pulmonary edema or worsening symptoms on vasodilator therapy. Will plan to cautiously initiate sildenafil - so far she is tolerating 20 mg TID and actually endorses an improvement in her breathing and activity tolerance.  Plan:  - Continue sildenafil 20mg TID  - Discussed monitoring for side effects. STOP medication of pt becomes hypoxic, hypotensive, or develops worsening SOB. Counseled to call with wt gain of 3 pounds in 24 h or 5 pounds in 1 week - RTC August

## 2024-07-29 NOTE — DISCHARGE NOTE PROVIDER - [id number]_GEN_ALL_CORE
Procedure Scheduling Checklist-Dr. Dee  For questions regarding your procedure times/instructions call [phone].     ** If you need to reschedule your procedure, please call [phone]   at least 48 hours in advance, if possible.     Procedure Time: the surgery center/hospital will call a few days prior to your procedure with your arrival time.     If you are taking Coumadin (Warfarin) please arrive 15 minutes earlier for an INR to be drawn.            Insurance:  Payor: [employer] / Plan: CHO[employer] PLUS/[employer] / Product Type: PPO MISC    Surgery location:    [address] [place] [address]    Procedure Date:     8/13/24 Ganglion Impar block                    Surgery will cancel your procedure if you are more than 15 minutes late      Pacemaker/Defibrillator: No  Blood Thinner: Blood Thinners/Aspirin/NSAIDS DO NOT NEED TO BE HELD for your procedure(s)  Diabetic:  No  Heart Valve or Joint Replacement:  No  Antibiotic (if needed, take 1 hour before your procedure): No    Anesthesia:  LOCAL    Diet:    No Restrictions    Medication:   If you are receiving IV Sedation, take only medication critical to your care (Heart and Blood Pressure medication) with only a small sip of water the morning of your procedure.     Driving:  No restrictions    Return to Clinic: Please schedule for an office follow-up visit 3 weeks after your procedure       **Please shower or bathe either the night before your procedure or the morning of your procedure to help reduce the risk of infection.       **For questions about your procedure time, please call the surgery scheduler at [phone]. Questions regarding the procedure itself or medications, please call the office at [phone].  Please note that no one is in the office on Fridays, but we will get back to you on the next business day.  Mondays and Wednesdays are clinic days with back-to-back patients. If you call the office on those  days with questions, staff may not get back to you until the following business day.  You can also send us messages through the [employer] [facility].    ==============================================================================================     This document is complete and the patient is ready for discharge. Burow's Graft Text: The defect edges were debeveled with a #15 scalpel blade.  Given the location of the defect, shape of the defect, the proximity to free margins and the presence of a standing cone deformity a Burow's skin graft was deemed most appropriate. The standing cone was removed and this tissue was then trimmed to the shape of the primary defect. The adipose tissue was also removed until only dermis and epidermis were left.  The skin margins of the secondary defect were undermined to an appropriate distance in all directions utilizing iris scissors.  The secondary defect was closed with interrupted buried subcutaneous sutures.  The skin edges were then re-apposed with running  sutures.  The skin graft was then placed in the primary defect and oriented appropriately.

## 2024-08-06 ENCOUNTER — NON-APPOINTMENT (OUTPATIENT)
Age: 79
End: 2024-08-06

## 2024-08-07 ENCOUNTER — NON-APPOINTMENT (OUTPATIENT)
Age: 79
End: 2024-08-07

## 2024-08-07 ENCOUNTER — APPOINTMENT (OUTPATIENT)
Dept: PULMONOLOGY | Facility: CLINIC | Age: 79
End: 2024-08-07

## 2024-08-07 PROCEDURE — 99214 OFFICE O/P EST MOD 30 MIN: CPT

## 2024-08-07 PROCEDURE — G2211 COMPLEX E/M VISIT ADD ON: CPT

## 2024-08-07 NOTE — ASSESSMENT
[FreeTextEntry1] : 80 yo F w/ hx of HTN, morbid obesity, NIDDM, pAF, hx of PE 3/2023, DURAN on CPAP, ? airway disease, GERD who developed SOB over several months. 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    #Precapillary PH - Despite her significant RF for group II disease, her hemodynamics are all consistent with precapillary disease, PAWP 9mmHg (not even borderline). Suspect there still may be a component of obesity and DURAN, but she has had significant weight LOSS over the last few years and is on CPAP therapy. Despite hx of PE there is no evidence to suggest CTEPD, may have microvascular disease.   Will consider as idiopathic PAH i/s/o hemodynamics that seem out of proportion to underlying group II and III risk factors. Has had excellent response to monotherapy treatment w/ sildenafil, now NYHA FC II. May consider dual therapy pending repeat risk stratification at 6 months w/ 6MWT and NTproBNP. However, if she is low risk given her age and comorbidities would not escalate therapy.  Plan:  - Continue sildenafil 20mg TID  - RTC October to do visit and 6MWT + pro BNP - Advised pt to continue follow up with Dr. Valenzuela

## 2024-08-07 NOTE — HISTORY OF PRESENT ILLNESS
[TextBox_4] : 80 yo F w/ hx of HTN, morbid obesity, NIDDM, pAF, hx of PE 3/2023, DURAN on CPAP, ? airway disease, GERD who developed SOB over several months, was found to have B/L PE's 3/2023, TTE unable to visualize RV, but was placed on A/C, readmitted to hospital 5 months later (8/2023) due to recurrence of her EPPS a/w wheezing/cough and tachycardia w/ hypoxia as well as anemia. Readmission felt to be multifactorial 2/2 anemia, asthma, ?CHF. She has been following with pulmonary (Dr. Keita), has had improvement in SOB but still not back to  baseline > 1 year after initial PE. Difficult to evaluate echocardiograms due to morbid obesity, concerned for underlying PH and referred to PH clinic due to continued hypoxia on exertion. Repeat CTAs have demonstrated minimal evidence of CTED (some areas of vascular pruning, but no overt webs or occlusions). Exercise capacity severely diminished from last year, now limited even with minimal activity.  Never smoker, no history of drug use. Remains on eliquis for AC, no adverse effects. No history of parenchymal lung disease or chronic hypoxemic conditions. History of anemia, from hemorrhoidal bleeding per her but has since recovered. No history of diet pills or supplements. Has been on diuretics for a prolonged period of time, does not feel it affects her symptoms. Decision made to pursue RHC as TTE unable to visualize RV and concern for PH.  RHC 5/9/24   PA=44/19/25  WP=9  PA sat 66% Ao sat 92%  CO/CI (Td) 4.63/2.22 , CO/CI (F) 4.75/2.28  TPG = 16, DPG= 10  PVR= 3.46 PVRi 7.2  Isolated precapillary pulmonary hypertension present despite adequate treatment of WHO Group II and III risk factors suggesting possible idiopathic PAH WHO Group I disease.  PH Regimen sildenafil 20 mg TID [started May 2024]  8-7-24  Feels better than the last visit. her breathing is easier,. She is doing more than before. Not missing doses. No headaches. Has flushing but it is occasional and not bothersome. No GI side effects. No tightness in her chest anymore. She is not as out of breath on stairs and it does not take as long to recover. Does not have to rest in the middle of a staircase anymore. SBP has been 107-112. Rare 130s. Weight has been stable, trying to lose a few pounds. On Weekly Ozempic. SpO2 96-98% on room air. Blood sugars have been under good control. Keeps a log of all her data. No Dizziness or syncope or chest pain. No more swelling that her usual mild ankle edema. Takes Bumex 1 mg daily. If notices increased swelling will take an additional tablet

## 2024-08-07 NOTE — PROCEDURE
[FreeTextEntry1] : 5/9/24 RHC Baseline RHC:  RA=8  RV=46/8  PA=44/19/25  WP=9  PA sat 66% Ao sat 92%  CO/CI (Td) 4.63/2.22 , CO/CI (F) 4.75/2.28  TPG = 16, DPG= 10  PVR= 3.46 PVRi 7.2   SVR= 21WU (1722 dynes)  PVR/SVR= 0.16  SV = 57.1  SVi =27.4  Oumar=3.125   Impression: Isolated precapillary pulmonary hypertension present despite adequate treatment of WHO Group II and III risk factors suggestiong possible idiopathic PAH WHO Group I disease. However, pt with significantly elevated SVR. Despite low PAWP, she remains at elevated risk for Group 2 PH which may be unmasked with vasodilatory agents  Recommendations:   Possible idiopathic PAH vs. Group II/III PH with normal PAWP in setting of euvolemia. Given severity of symptoms will cautiously treat with monotherapy, sildenafil, and close follow up in PH clinic 6MWT 4/2024 201m, SpO2 desaturation to 88%, decrease in distance by 30 meters, worsening hypoxia compared to 10/2023 **** PFT 2/2024 FEV1, FVC, FEV1/FVC are WNL, no BDR, significantly improved from prior.  **** TTE 8/2/2023 CONCLUSIONS: 1. Mitral annular calcification, otherwise normal mitral valve. Minimal mitral regurgitation. 2. Aortic valve leaflet morphology not well visualized. Peak transaortic valve gradient equals 20 mm Hg, mean transaortic valve gradient equals 11 mm Hg, estimated aortic valve area equals 1.8 sq cm (by continuity equation),consistent with mild aortic stenosis. Mild aortic regurgitation. 3. Endocardium not well visualized; grossly normal left ventricular systolic function.  Estimated LVEF in dxx99-85% range (by visual estimate). 4. The right ventricle is not well visualized; grossly normal right ventricular systolic function. *** Compared with echocardiogram of 3/20/2023, no significant changes noted.  *** CTA PE protocol 5/10/2023 IMPRESSION: Interval resolution of the previously seen pulmonary emboli. No acute pulmonary embolism. *** CTA 3/18/2023 Acute pulmonary emboli in the right interlobar artery and right upper, middle, and lower lobar branches. Acute pulmonary emboli within  the left upper and lower lobar branches. Increased size of the right ventricle as compared to the left ventricle, with leftward convexity of the interventricular septum. No pericardial effusion.

## 2024-08-07 NOTE — PROCEDURE
[FreeTextEntry1] : 5/9/24 RHC Baseline RHC:  RA=8  RV=46/8  PA=44/19/25  WP=9  PA sat 66% Ao sat 92%  CO/CI (Td) 4.63/2.22 , CO/CI (F) 4.75/2.28  TPG = 16, DPG= 10  PVR= 3.46 PVRi 7.2   SVR= 21WU (1722 dynes)  PVR/SVR= 0.16  SV = 57.1  SVi =27.4  Oumar=3.125   Impression: Isolated precapillary pulmonary hypertension present despite adequate treatment of WHO Group II and III risk factors suggestiong possible idiopathic PAH WHO Group I disease. However, pt with significantly elevated SVR. Despite low PAWP, she remains at elevated risk for Group 2 PH which may be unmasked with vasodilatory agents  Recommendations:   Possible idiopathic PAH vs. Group II/III PH with normal PAWP in setting of euvolemia. Given severity of symptoms will cautiously treat with monotherapy, sildenafil, and close follow up in PH clinic 6MWT 4/2024 201m, SpO2 desaturation to 88%, decrease in distance by 30 meters, worsening hypoxia compared to 10/2023 **** PFT 2/2024 FEV1, FVC, FEV1/FVC are WNL, no BDR, significantly improved from prior.  **** TTE 8/2/2023 CONCLUSIONS: 1. Mitral annular calcification, otherwise normal mitral valve. Minimal mitral regurgitation. 2. Aortic valve leaflet morphology not well visualized. Peak transaortic valve gradient equals 20 mm Hg, mean transaortic valve gradient equals 11 mm Hg, estimated aortic valve area equals 1.8 sq cm (by continuity equation),consistent with mild aortic stenosis. Mild aortic regurgitation. 3. Endocardium not well visualized; grossly normal left ventricular systolic function.  Estimated LVEF in cic44-29% range (by visual estimate). 4. The right ventricle is not well visualized; grossly normal right ventricular systolic function. *** Compared with echocardiogram of 3/20/2023, no significant changes noted.  *** CTA PE protocol 5/10/2023 IMPRESSION: Interval resolution of the previously seen pulmonary emboli. No acute pulmonary embolism. *** CTA 3/18/2023 Acute pulmonary emboli in the right interlobar artery and right upper, middle, and lower lobar branches. Acute pulmonary emboli within  the left upper and lower lobar branches. Increased size of the right ventricle as compared to the left ventricle, with leftward convexity of the interventricular septum. No pericardial effusion.

## 2024-08-07 NOTE — ASSESSMENT
[FreeTextEntry1] : 78 yo F w/ hx of HTN, morbid obesity, NIDDM, pAF, hx of PE 3/2023, DURAN on CPAP, ? airway disease, GERD who developed SOB over several months. 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    #Precapillary PH - Despite her significant RF for group II disease, her hemodynamics are all consistent with precapillary disease, PAWP 9mmHg (not even borderline). Suspect there still may be a component of obesity and DURAN, but she has had significant weight LOSS over the last few years and is on CPAP therapy. Despite hx of PE there is no evidence to suggest CTEPD, may have microvascular disease.   Will consider as idiopathic PAH i/s/o hemodynamics that seem out of proportion to underlying group II and III risk factors. Has had excellent response to monotherapy treatment w/ sildenafil, now NYHA FC II. May consider dual therapy pending repeat risk stratification at 6 months w/ 6MWT and NTproBNP. However, if she is low risk given her age and comorbidities would not escalate therapy.  Plan:  - Continue sildenafil 20mg TID  - RTC October to do visit and 6MWT + pro BNP - Advised pt to continue follow up with Dr. Valenzuela

## 2024-08-07 NOTE — HISTORY OF PRESENT ILLNESS
[TextBox_4] : 78 yo F w/ hx of HTN, morbid obesity, NIDDM, pAF, hx of PE 3/2023, DURAN on CPAP, ? airway disease, GERD who developed SOB over several months, was found to have B/L PE's 3/2023, TTE unable to visualize RV, but was placed on A/C, readmitted to hospital 5 months later (8/2023) due to recurrence of her EPPS a/w wheezing/cough and tachycardia w/ hypoxia as well as anemia. Readmission felt to be multifactorial 2/2 anemia, asthma, ?CHF. She has been following with pulmonary (Dr. Keita), has had improvement in SOB but still not back to  baseline > 1 year after initial PE. Difficult to evaluate echocardiograms due to morbid obesity, concerned for underlying PH and referred to PH clinic due to continued hypoxia on exertion. Repeat CTAs have demonstrated minimal evidence of CTED (some areas of vascular pruning, but no overt webs or occlusions). Exercise capacity severely diminished from last year, now limited even with minimal activity.  Never smoker, no history of drug use. Remains on eliquis for AC, no adverse effects. No history of parenchymal lung disease or chronic hypoxemic conditions. History of anemia, from hemorrhoidal bleeding per her but has since recovered. No history of diet pills or supplements. Has been on diuretics for a prolonged period of time, does not feel it affects her symptoms. Decision made to pursue RHC as TTE unable to visualize RV and concern for PH.  RHC 5/9/24   PA=44/19/25  WP=9  PA sat 66% Ao sat 92%  CO/CI (Td) 4.63/2.22 , CO/CI (F) 4.75/2.28  TPG = 16, DPG= 10  PVR= 3.46 PVRi 7.2  Isolated precapillary pulmonary hypertension present despite adequate treatment of WHO Group II and III risk factors suggesting possible idiopathic PAH WHO Group I disease.  PH Regimen sildenafil 20 mg TID [started May 2024]  8-7-24  Feels better than the last visit. her breathing is easier,. She is doing more than before. Not missing doses. No headaches. Has flushing but it is occasional and not bothersome. No GI side effects. No tightness in her chest anymore. She is not as out of breath on stairs and it does not take as long to recover. Does not have to rest in the middle of a staircase anymore. SBP has been 107-112. Rare 130s. Weight has been stable, trying to lose a few pounds. On Weekly Ozempic. SpO2 96-98% on room air. Blood sugars have been under good control. Keeps a log of all her data. No Dizziness or syncope or chest pain. No more swelling that her usual mild ankle edema. Takes Bumex 1 mg daily. If notices increased swelling will take an additional tablet

## 2024-08-14 ENCOUNTER — APPOINTMENT (OUTPATIENT)
Dept: ORTHOPEDIC SURGERY | Facility: CLINIC | Age: 79
End: 2024-08-14
Payer: MEDICARE

## 2024-08-14 VITALS — HEIGHT: 60 IN | WEIGHT: 258 LBS | BODY MASS INDEX: 50.65 KG/M2

## 2024-08-14 DIAGNOSIS — Z96.653 PRESENCE OF ARTIFICIAL KNEE JOINT, BILATERAL: ICD-10-CM

## 2024-08-14 DIAGNOSIS — M17.0 BILATERAL PRIMARY OSTEOARTHRITIS OF KNEE: ICD-10-CM

## 2024-08-14 PROCEDURE — G2211 COMPLEX E/M VISIT ADD ON: CPT

## 2024-08-14 PROCEDURE — 73562 X-RAY EXAM OF KNEE 3: CPT | Mod: 50

## 2024-08-14 PROCEDURE — 99213 OFFICE O/P EST LOW 20 MIN: CPT

## 2024-08-14 NOTE — PHYSICAL EXAM
[de-identified] : General appearance: well-nourished and hydrated, pleasant, alert and oriented x 3, cooperative. HEENT: Normocephalic, EOM intact, Nasal septum midline, Oral cavity clear, External auditory canal clear. Cardiovascular: no apparent abnormalities, no lower leg edema, no varicosities, pedal pulses are palpable. Lymphatics Lymph nodes: none palpated, Lymphedema: not present. Neurologic: sensation is normal, no muscle weakness in upper or lower extremities, patella tendon reflexes intact. Dermatologic no apparent skin lesions, moist, warm, no rash. Spine: cervical spine appears normal and moves freely, thoracic spine appears normal and moves freely, lumbosacral spine appears normal and moves freely. Gait: nonantalgic.    Right Knee Inspection: no effusion Wounds: healed midline incision  Alignment: normal. Palpation: no specific tenderness on palpation. ROM: Active (in degrees): 0-125  Ligamentous laxity (neg): negative ant. drawer test, negative post. drawer test, stable to varus stress test, stable to valgus stress test, Patellofemoral Alignment Test: Q angle-, normal. Muscle Test: good quad strength. Leg examination: calf is soft and non-tender.  Left Knee Inspection: no effusion Wounds: healed midline incision  Alignment: normal. Palpation: no specific tenderness on palpation. ROM: Active (in degrees): 0-125  Ligamentous laxity (neg): negative ant. drawer test, negative post. drawer test, stable to varus stress test, stable to valgus stress test, Patellofemoral Alignment Test: Q angle-, normal. Muscle Test: good quad strength. Leg examination: calf is soft and non-tender.  [de-identified] : Right left knee x-ray, AP, lateral, merchant view taken at the office today demonstrates a total knee replacement in satisfactory position and alignment. No evidence of loosening. The patella sits in a central position.  Left knee x-ray, AP, lateral, merchant view taken at the office today demonstrates a total knee replacement in satisfactory position and alignment. No evidence of loosening. The patella sits in a central position.

## 2024-08-14 NOTE — DISCUSSION/SUMMARY
[de-identified] : Patient is doing well following their s/p Bilateral TKRs. She does have achiness which I believe is muscular in nature. I reviewed x-rays with them and compared to prior films which there is no change from prior films. I have reassured them that their implants are functioning well. She does have some discoloration on her left lower extremity that is secondary to chronic venous changes, I advised her to discuss this with her PCP. All questions answered, understanding verbalized.   She is encouraged to continue to stay active with PT, cane for ambulation and activities as tolerated.    I will see them back in 1 year with x-rays.

## 2024-08-14 NOTE — ADDENDUM
[FreeTextEntry1] : This note was written by Britt Reinoso on 08/14/2024 acting as scribe for Dr. Joselo WHITLEY I, Dr. Joselo Ramírez, have read and attest that all the information, medical decision making and discharge instructions within are true and accurate.   This note was written by Bob Sanford on 08/14/2024 acting as scribe for Dr. Joselo WHITLEY I, Dr. Joselo Ramírez, have read and attest that all the information, medical decision making and discharge instructions within are true and accurate.

## 2024-08-14 NOTE — HISTORY OF PRESENT ILLNESS
[de-identified] : TORI WOOTEN 79 year old female presents for follow-up evaluation of bilateral TKR. The left knee was done 5 years ago, and the right was done 12 years ago. The patient is doing well however she notes she is not very active. As a result, she would like to go back to PT in order to strengthen her knees and gain stamina. She notes having some stiffness and discomfort which she takes Tylenol to relieve.

## 2024-09-12 ENCOUNTER — APPOINTMENT (OUTPATIENT)
Dept: PULMONOLOGY | Facility: CLINIC | Age: 79
End: 2024-09-12
Payer: MEDICARE

## 2024-09-12 VITALS — OXYGEN SATURATION: 95 % | DIASTOLIC BLOOD PRESSURE: 74 MMHG | SYSTOLIC BLOOD PRESSURE: 118 MMHG | HEART RATE: 86 BPM

## 2024-09-12 DIAGNOSIS — Z23 ENCOUNTER FOR IMMUNIZATION: ICD-10-CM

## 2024-09-12 DIAGNOSIS — G47.33 OBSTRUCTIVE SLEEP APNEA (ADULT) (PEDIATRIC): ICD-10-CM

## 2024-09-12 DIAGNOSIS — I27.20 PULMONARY HYPERTENSION, UNSPECIFIED: ICD-10-CM

## 2024-09-12 DIAGNOSIS — I26.99 OTHER PULMONARY EMBOLISM W/OUT ACUTE COR PULMONALE: ICD-10-CM

## 2024-09-12 DIAGNOSIS — J44.89 OTHER SPECIFIED CHRONIC OBSTRUCTIVE PULMONARY DISEASE: ICD-10-CM

## 2024-09-12 DIAGNOSIS — R06.02 SHORTNESS OF BREATH: ICD-10-CM

## 2024-09-12 PROCEDURE — G0008: CPT

## 2024-09-12 PROCEDURE — 99213 OFFICE O/P EST LOW 20 MIN: CPT | Mod: 25

## 2024-09-12 PROCEDURE — 90662 IIV NO PRSV INCREASED AG IM: CPT

## 2024-09-12 PROCEDURE — 94618 PULMONARY STRESS TESTING: CPT

## 2024-09-12 NOTE — PHYSICAL EXAM
Initiate Treatment: 5FU, apply bid for 4 weeks [No Acute Distress] : no acute distress Detail Level: Detailed [Normal Oropharynx] : normal oropharynx Detail Level: Zone [Normal Appearance] : normal appearance Initiate Treatment: 5FU cream as directed. [No Neck Mass] : no neck mass [Normal Rate/Rhythm] : normal rate/rhythm [Normal S1, S2] : normal s1, s2 [No Murmurs] : no murmurs [No Resp Distress] : no resp distress [Clear to Auscultation Bilaterally] : clear to auscultation bilaterally [No Abnormalities] : no abnormalities [Benign] : benign [Normal Gait] : normal gait [No Clubbing] : no clubbing [No Cyanosis] : no cyanosis [No Edema] : no edema [FROM] : FROM [Normal Color/ Pigmentation] : normal color/ pigmentation [No Focal Deficits] : no focal deficits [Oriented x3] : oriented x3 [Normal Affect] : normal affect

## 2024-10-15 ENCOUNTER — APPOINTMENT (OUTPATIENT)
Dept: OBGYN | Facility: CLINIC | Age: 79
End: 2024-10-15
Payer: MEDICARE

## 2024-10-15 PROCEDURE — G0101: CPT | Mod: GA

## 2024-10-15 PROCEDURE — 81002 URINALYSIS NONAUTO W/O SCOPE: CPT

## 2024-10-15 PROCEDURE — 99213 OFFICE O/P EST LOW 20 MIN: CPT | Mod: 25

## 2024-10-23 ENCOUNTER — APPOINTMENT (OUTPATIENT)
Dept: PULMONOLOGY | Facility: CLINIC | Age: 79
End: 2024-10-23
Payer: MEDICARE

## 2024-10-23 VITALS
BODY MASS INDEX: 50.65 KG/M2 | HEART RATE: 85 BPM | DIASTOLIC BLOOD PRESSURE: 83 MMHG | WEIGHT: 258 LBS | SYSTOLIC BLOOD PRESSURE: 123 MMHG | OXYGEN SATURATION: 95 % | HEIGHT: 60 IN | TEMPERATURE: 97.5 F

## 2024-10-23 DIAGNOSIS — J44.89 OTHER SPECIFIED CHRONIC OBSTRUCTIVE PULMONARY DISEASE: ICD-10-CM

## 2024-10-23 DIAGNOSIS — I27.20 PULMONARY HYPERTENSION, UNSPECIFIED: ICD-10-CM

## 2024-10-23 PROCEDURE — 99214 OFFICE O/P EST MOD 30 MIN: CPT | Mod: 25

## 2024-10-23 PROCEDURE — 94618 PULMONARY STRESS TESTING: CPT

## 2024-10-23 RX ORDER — ROSUVASTATIN CALCIUM 20 MG/1
20 TABLET, FILM COATED ORAL
Refills: 0 | Status: ACTIVE | COMMUNITY

## 2024-10-24 LAB — NT-PROBNP SERPL-MCNC: 101 PG/ML

## 2024-11-08 ENCOUNTER — TRANSCRIPTION ENCOUNTER (OUTPATIENT)
Age: 79
End: 2024-11-08

## 2024-11-11 ENCOUNTER — OFFICE (OUTPATIENT)
Dept: URBAN - METROPOLITAN AREA CLINIC 27 | Facility: CLINIC | Age: 79
Setting detail: OPHTHALMOLOGY
End: 2024-11-11
Payer: MEDICARE

## 2024-11-11 ENCOUNTER — RX ONLY (RX ONLY)
Age: 79
End: 2024-11-11

## 2024-11-11 DIAGNOSIS — E11.9: ICD-10-CM

## 2024-11-11 DIAGNOSIS — H25.13: ICD-10-CM

## 2024-11-11 DIAGNOSIS — H53.2: ICD-10-CM

## 2024-11-11 DIAGNOSIS — H40.1134: ICD-10-CM

## 2024-11-11 DIAGNOSIS — H35.89: ICD-10-CM

## 2024-11-11 PROCEDURE — 92004 COMPRE OPH EXAM NEW PT 1/>: CPT | Performed by: OPHTHALMOLOGY

## 2024-11-11 ASSESSMENT — TONOMETRY
OS_IOP_MMHG: 16
OD_IOP_MMHG: 18
OS_IOP_MMHG: 17
OD_IOP_MMHG: 17

## 2024-11-11 ASSESSMENT — CONFRONTATIONAL VISUAL FIELD TEST (CVF)
OS_FINDINGS: FULL
OD_FINDINGS: FULL

## 2024-11-12 PROBLEM — E11.9 DIABETES TYPE 2 NO RETINOPATHY ; BOTH EYES: Status: ACTIVE | Noted: 2024-11-11

## 2024-11-12 PROBLEM — H35.89 RETINAL EXUDATES AND DEPOSITS ; LEFT EYE: Status: ACTIVE | Noted: 2024-11-11

## 2024-11-12 ASSESSMENT — REFRACTION_CURRENTRX
OS_VPRISM_DIRECTION: PROGS
OS_OVR_VA: 20/
OD_CYLINDER: +1.25
OD_OVR_VA: 20/
OS_AXIS: 045
OD_ADD: +2.00
OD_AXIS: 003
OD_VPRISM_DIRECTION: PROGS
OD_SPHERE: +1.25
OS_ADD: +2.00
OS_CYLINDER: +0.25
OS_SPHERE: +2.00

## 2024-11-12 ASSESSMENT — REFRACTION_AUTOREFRACTION
OD_SPHERE: +1.50
OS_SPHERE: +1.50
OS_CYLINDER: +1.00
OD_CYLINDER: +1.50
OD_AXIS: 001
OS_AXIS: 004

## 2024-11-12 ASSESSMENT — KERATOMETRY
OS_K1POWER_DIOPTERS: 45.50
OD_K1POWER_DIOPTERS: 45.50
OS_K2POWER_DIOPTERS: 45.50
OS_AXISANGLE_DEGREES: 090
OD_AXISANGLE_DEGREES: 019
OD_K2POWER_DIOPTERS: 45.75

## 2024-11-12 ASSESSMENT — VISUAL ACUITY
OD_BCVA: 20/20
OS_BCVA: 20/40-1

## 2024-12-02 NOTE — OCCUPATIONAL THERAPY INITIAL EVALUATION ADULT - PHYSICAL ASSIST/NONPHYSICAL ASSIST:DRESS UPPER BODY, OT EVAL
verbal cues Render Risk Assessment In Note?: no Recommendation Preamble: The following recommendations were made during the visit: Recommendations (Free Text): Aquaphor or Cerave Detail Level: Zone

## 2024-12-10 ENCOUNTER — APPOINTMENT (OUTPATIENT)
Dept: PULMONOLOGY | Facility: CLINIC | Age: 79
End: 2024-12-10

## 2025-01-29 ENCOUNTER — NON-APPOINTMENT (OUTPATIENT)
Age: 80
End: 2025-01-29

## 2025-01-29 ENCOUNTER — APPOINTMENT (OUTPATIENT)
Dept: PULMONOLOGY | Facility: CLINIC | Age: 80
End: 2025-01-29
Payer: MEDICARE

## 2025-01-29 VITALS
SYSTOLIC BLOOD PRESSURE: 130 MMHG | OXYGEN SATURATION: 95 % | RESPIRATION RATE: 14 BRPM | HEART RATE: 87 BPM | BODY MASS INDEX: 49.48 KG/M2 | HEIGHT: 60 IN | TEMPERATURE: 97.3 F | WEIGHT: 252 LBS | DIASTOLIC BLOOD PRESSURE: 79 MMHG

## 2025-01-29 DIAGNOSIS — I27.20 PULMONARY HYPERTENSION, UNSPECIFIED: ICD-10-CM

## 2025-01-29 DIAGNOSIS — J44.89 OTHER SPECIFIED CHRONIC OBSTRUCTIVE PULMONARY DISEASE: ICD-10-CM

## 2025-01-29 DIAGNOSIS — R06.02 SHORTNESS OF BREATH: ICD-10-CM

## 2025-01-29 LAB — NT-PROBNP SERPL-MCNC: 69 PG/ML

## 2025-01-29 PROCEDURE — G2211 COMPLEX E/M VISIT ADD ON: CPT

## 2025-01-29 PROCEDURE — 99214 OFFICE O/P EST MOD 30 MIN: CPT

## 2025-02-27 ENCOUNTER — APPOINTMENT (OUTPATIENT)
Dept: PULMONOLOGY | Facility: CLINIC | Age: 80
End: 2025-02-27
Payer: MEDICARE

## 2025-02-27 ENCOUNTER — OFFICE (OUTPATIENT)
Facility: LOCATION | Age: 80
Setting detail: OPHTHALMOLOGY
End: 2025-02-27
Payer: MEDICARE

## 2025-02-27 VITALS — DIASTOLIC BLOOD PRESSURE: 78 MMHG | HEART RATE: 98 BPM | OXYGEN SATURATION: 91 % | SYSTOLIC BLOOD PRESSURE: 129 MMHG

## 2025-02-27 DIAGNOSIS — H53.2: ICD-10-CM

## 2025-02-27 DIAGNOSIS — H50.05: ICD-10-CM

## 2025-02-27 DIAGNOSIS — E66.01 MORBID (SEVERE) OBESITY DUE TO EXCESS CALORIES: ICD-10-CM

## 2025-02-27 DIAGNOSIS — I26.99 OTHER PULMONARY EMBOLISM W/OUT ACUTE COR PULMONALE: ICD-10-CM

## 2025-02-27 DIAGNOSIS — H25.13: ICD-10-CM

## 2025-02-27 DIAGNOSIS — I27.20 PULMONARY HYPERTENSION, UNSPECIFIED: ICD-10-CM

## 2025-02-27 DIAGNOSIS — H40.1134: ICD-10-CM

## 2025-02-27 DIAGNOSIS — G47.33 OBSTRUCTIVE SLEEP APNEA (ADULT) (PEDIATRIC): ICD-10-CM

## 2025-02-27 DIAGNOSIS — H52.03: ICD-10-CM

## 2025-02-27 PROCEDURE — 92060 SENSORIMOTOR EXAMINATION: CPT | Performed by: OPHTHALMOLOGY

## 2025-02-27 PROCEDURE — 92015 DETERMINE REFRACTIVE STATE: CPT | Performed by: OPHTHALMOLOGY

## 2025-02-27 PROCEDURE — 94618 PULMONARY STRESS TESTING: CPT

## 2025-02-27 PROCEDURE — 99213 OFFICE O/P EST LOW 20 MIN: CPT | Mod: 25

## 2025-02-27 PROCEDURE — 92012 INTRM OPH EXAM EST PATIENT: CPT | Performed by: OPHTHALMOLOGY

## 2025-02-27 PROCEDURE — 92250 FUNDUS PHOTOGRAPHY W/I&R: CPT | Performed by: OPHTHALMOLOGY

## 2025-02-27 ASSESSMENT — REFRACTION_AUTOREFRACTION
OD_CYLINDER: -0.75
OS_AXIS: 110
OD_AXIS: 062
OD_SPHERE: +1.75
OS_CYLINDER: -1.00
OS_SPHERE: +2.25

## 2025-02-27 ASSESSMENT — REFRACTION_MANIFEST
OS_SPHERE: +2.25
OD_ADD: +3.25
OD_AXIS: 060
OS_CYLINDER: -0.75
OS_AXIS: 110
OD_VPRISM_DIRECTION: BO
OD_HPRISM: 7
OD_SPHERE: +2.00
OD_VA1: 20/30+3
OS_ADD: +3.25
OS_VA1: 20/20
OS_HPRISM: 7
OD_CYLINDER: -1.00
OS_VPRISM_DIRECTION: BO

## 2025-02-27 ASSESSMENT — REFRACTION_CURRENTRX
OD_OVR_VA: 20/
OS_ADD: +2.50
OD_SPHERE: +2.25
OS_HPRISM: 5
OD_HPRISM: 6
OD_CYLINDER: -1.50
OD_ADD: +2.50
OS_HPRISM_DIRECTION: BO
OS_CYLINDER: -1.00
OS_VPRISM_DIRECTION: PROGS
OD_HPRISM_DIRECTION: BO
OS_OVR_VA: 20/
OS_SPHERE: +2.50
OD_VPRISM_DIRECTION: PROGS
OD_AXIS: 087
OS_AXIS: 142

## 2025-02-27 ASSESSMENT — VISUAL ACUITY
OS_BCVA: 20/40
OD_BCVA: 20/20

## 2025-02-27 ASSESSMENT — CONFRONTATIONAL VISUAL FIELD TEST (CVF)
OD_FINDINGS: FULL
OS_FINDINGS: FULL

## 2025-02-27 ASSESSMENT — KERATOMETRY
OS_K1POWER_DIOPTERS: 45.50
OS_K2POWER_DIOPTERS: 46.25
OD_K1POWER_DIOPTERS: 45.50
OS_AXISANGLE_DEGREES: 109
OD_AXISANGLE_DEGREES: 105
OD_K2POWER_DIOPTERS: 46.25

## 2025-03-13 NOTE — OCCUPATIONAL THERAPY INITIAL EVALUATION ADULT - PATIENT/FAMILY/SIGNIFICANT OTHER GOALS STATEMENT, OT EVAL
[facility]  ED to EMPATH Checklist:      Goal for EMPATH: Suicidality    Current Behavior: Calm    Safety Concerns: Suicidal, no plan    Legal Hold Status: Voluntary    Medically Cleared by ED provider: Yes    Patient Therapeutically Searched: Therapeutic search by ED staff (strings, belts, shoes, pockets, electronics, etc.)    Belongings: Remain with patient    Independent Ambulation at Baseline: Yes/No: Yes    Participates in Care/Conversation: Yes/No: Yes    Patient Informed about EMPATH: Yes/No: Yes    DEC: Ordered and pending    Patient Ready to be Transferred to EMPATH? Yes/No: Yes       Pt would like to be restored to prior level of function and receive rehab services at home post-operatively.

## 2025-04-15 ENCOUNTER — RESULT REVIEW (OUTPATIENT)
Age: 80
End: 2025-04-15

## 2025-04-15 ENCOUNTER — OUTPATIENT (OUTPATIENT)
Dept: OUTPATIENT SERVICES | Facility: HOSPITAL | Age: 80
LOS: 1 days | End: 2025-04-15
Payer: MEDICARE

## 2025-04-15 DIAGNOSIS — I27.20 PULMONARY HYPERTENSION, UNSPECIFIED: ICD-10-CM

## 2025-04-15 DIAGNOSIS — Z98.890 OTHER SPECIFIED POSTPROCEDURAL STATES: Chronic | ICD-10-CM

## 2025-04-15 DIAGNOSIS — Z90.721 ACQUIRED ABSENCE OF OVARIES, UNILATERAL: Chronic | ICD-10-CM

## 2025-04-15 DIAGNOSIS — Z98.891 HISTORY OF UTERINE SCAR FROM PREVIOUS SURGERY: Chronic | ICD-10-CM

## 2025-04-15 DIAGNOSIS — Z96.659 PRESENCE OF UNSPECIFIED ARTIFICIAL KNEE JOINT: Chronic | ICD-10-CM

## 2025-04-15 PROCEDURE — 93306 TTE W/DOPPLER COMPLETE: CPT

## 2025-04-15 PROCEDURE — 93306 TTE W/DOPPLER COMPLETE: CPT | Mod: 26

## 2025-04-16 ENCOUNTER — APPOINTMENT (OUTPATIENT)
Dept: PULMONOLOGY | Facility: CLINIC | Age: 80
End: 2025-04-16
Payer: MEDICARE

## 2025-04-16 VITALS
OXYGEN SATURATION: 91 % | HEIGHT: 60 IN | SYSTOLIC BLOOD PRESSURE: 107 MMHG | TEMPERATURE: 97.7 F | RESPIRATION RATE: 13 BRPM | DIASTOLIC BLOOD PRESSURE: 70 MMHG | WEIGHT: 252 LBS | BODY MASS INDEX: 49.48 KG/M2 | HEART RATE: 91 BPM

## 2025-04-16 DIAGNOSIS — R06.02 SHORTNESS OF BREATH: ICD-10-CM

## 2025-04-16 DIAGNOSIS — J44.89 OTHER SPECIFIED CHRONIC OBSTRUCTIVE PULMONARY DISEASE: ICD-10-CM

## 2025-04-16 DIAGNOSIS — I27.20 PULMONARY HYPERTENSION, UNSPECIFIED: ICD-10-CM

## 2025-04-16 PROCEDURE — 99215 OFFICE O/P EST HI 40 MIN: CPT

## 2025-04-16 PROCEDURE — G2211 COMPLEX E/M VISIT ADD ON: CPT

## 2025-04-16 RX ORDER — SOTATERCEPT-CSRK 45 MG
45 KIT SUBCUTANEOUS
Qty: 1 | Refills: 0 | Status: ACTIVE | COMMUNITY
Start: 2025-04-16 | End: 1900-01-01

## 2025-04-16 RX ORDER — SOTATERCEPT-CSRK 90 MG
2 X 45 KIT SUBCUTANEOUS
Qty: 1 | Refills: 15 | Status: ACTIVE | COMMUNITY
Start: 2025-04-16 | End: 1900-01-01

## 2025-04-17 LAB
ANION GAP SERPL CALC-SCNC: 15 MMOL/L
BASOPHILS # BLD AUTO: 0.05 K/UL
BASOPHILS NFR BLD AUTO: 0.7 %
BUN SERPL-MCNC: 32 MG/DL
CALCIUM SERPL-MCNC: 10.1 MG/DL
CHLORIDE SERPL-SCNC: 101 MMOL/L
CO2 SERPL-SCNC: 25 MMOL/L
CREAT SERPL-MCNC: 1.71 MG/DL
EGFRCR SERPLBLD CKD-EPI 2021: 30 ML/MIN/1.73M2
EOSINOPHIL # BLD AUTO: 0.13 K/UL
EOSINOPHIL NFR BLD AUTO: 1.9 %
GLUCOSE SERPL-MCNC: 104 MG/DL
HCT VFR BLD CALC: 37.4 %
HGB BLD-MCNC: 11.2 G/DL
IMM GRANULOCYTES NFR BLD AUTO: 0.3 %
LYMPHOCYTES # BLD AUTO: 1.75 K/UL
LYMPHOCYTES NFR BLD AUTO: 25.1 %
MAN DIFF?: NORMAL
MCHC RBC-ENTMCNC: 27.1 PG
MCHC RBC-ENTMCNC: 29.9 G/DL
MCV RBC AUTO: 90.3 FL
MONOCYTES # BLD AUTO: 0.67 K/UL
MONOCYTES NFR BLD AUTO: 9.6 %
NEUTROPHILS # BLD AUTO: 4.34 K/UL
NEUTROPHILS NFR BLD AUTO: 62.4 %
NT-PROBNP SERPL-MCNC: 81 PG/ML
PLATELET # BLD AUTO: 281 K/UL
POTASSIUM SERPL-SCNC: 4.2 MMOL/L
RBC # BLD: 4.14 M/UL
RBC # FLD: 16.6 %
SODIUM SERPL-SCNC: 141 MMOL/L
WBC # FLD AUTO: 6.96 K/UL

## 2025-04-28 ENCOUNTER — RX RENEWAL (OUTPATIENT)
Age: 80
End: 2025-04-28

## 2025-04-29 ENCOUNTER — NON-APPOINTMENT (OUTPATIENT)
Age: 80
End: 2025-04-29

## 2025-05-13 ENCOUNTER — APPOINTMENT (OUTPATIENT)
Dept: PULMONOLOGY | Facility: CLINIC | Age: 80
End: 2025-05-13
Payer: MEDICARE

## 2025-05-13 VITALS — SYSTOLIC BLOOD PRESSURE: 138 MMHG | DIASTOLIC BLOOD PRESSURE: 79 MMHG | OXYGEN SATURATION: 91 % | HEART RATE: 84 BPM

## 2025-05-13 DIAGNOSIS — G47.33 OBSTRUCTIVE SLEEP APNEA (ADULT) (PEDIATRIC): ICD-10-CM

## 2025-05-13 DIAGNOSIS — I26.99 OTHER PULMONARY EMBOLISM W/OUT ACUTE COR PULMONALE: ICD-10-CM

## 2025-05-13 PROCEDURE — G2211 COMPLEX E/M VISIT ADD ON: CPT

## 2025-05-13 PROCEDURE — 99213 OFFICE O/P EST LOW 20 MIN: CPT

## 2025-05-13 PROCEDURE — 36415 COLL VENOUS BLD VENIPUNCTURE: CPT

## 2025-05-14 ENCOUNTER — NON-APPOINTMENT (OUTPATIENT)
Age: 80
End: 2025-05-14

## 2025-05-14 LAB — DEPRECATED D DIMER PPP IA-ACNC: 173 NG/ML DDU

## 2025-05-15 ENCOUNTER — APPOINTMENT (OUTPATIENT)
Dept: PULMONOLOGY | Facility: CLINIC | Age: 80
End: 2025-05-15
Payer: MEDICARE

## 2025-05-15 DIAGNOSIS — D64.9 ANEMIA, UNSPECIFIED: ICD-10-CM

## 2025-05-15 DIAGNOSIS — R06.02 SHORTNESS OF BREATH: ICD-10-CM

## 2025-05-15 DIAGNOSIS — I27.20 PULMONARY HYPERTENSION, UNSPECIFIED: ICD-10-CM

## 2025-05-15 DIAGNOSIS — J44.89 OTHER SPECIFIED CHRONIC OBSTRUCTIVE PULMONARY DISEASE: ICD-10-CM

## 2025-05-15 PROCEDURE — 99212 OFFICE O/P EST SF 10 MIN: CPT | Mod: 93

## 2025-06-04 ENCOUNTER — LABORATORY RESULT (OUTPATIENT)
Age: 80
End: 2025-06-04

## 2025-06-06 ENCOUNTER — APPOINTMENT (OUTPATIENT)
Dept: PULMONOLOGY | Facility: CLINIC | Age: 80
End: 2025-06-06
Payer: MEDICARE

## 2025-06-06 DIAGNOSIS — I27.20 PULMONARY HYPERTENSION, UNSPECIFIED: ICD-10-CM

## 2025-06-06 DIAGNOSIS — R06.02 SHORTNESS OF BREATH: ICD-10-CM

## 2025-06-06 PROCEDURE — 99212 OFFICE O/P EST SF 10 MIN: CPT | Mod: 93

## 2025-06-24 ENCOUNTER — LABORATORY RESULT (OUTPATIENT)
Age: 80
End: 2025-06-24

## 2025-06-25 ENCOUNTER — NON-APPOINTMENT (OUTPATIENT)
Age: 80
End: 2025-06-25

## 2025-06-26 ENCOUNTER — APPOINTMENT (OUTPATIENT)
Dept: PULMONOLOGY | Facility: CLINIC | Age: 80
End: 2025-06-26

## 2025-06-26 PROCEDURE — 99212 OFFICE O/P EST SF 10 MIN: CPT | Mod: 93

## 2025-07-11 PROBLEM — R04.0 EPISTAXIS: Status: ACTIVE | Noted: 2025-06-26

## 2025-07-14 ENCOUNTER — RX RENEWAL (OUTPATIENT)
Age: 80
End: 2025-07-14

## 2025-07-15 ENCOUNTER — APPOINTMENT (OUTPATIENT)
Dept: PULMONOLOGY | Facility: CLINIC | Age: 80
End: 2025-07-15
Payer: MEDICARE

## 2025-07-15 ENCOUNTER — LABORATORY RESULT (OUTPATIENT)
Age: 80
End: 2025-07-15

## 2025-07-15 VITALS — OXYGEN SATURATION: 94 % | HEART RATE: 82 BPM | SYSTOLIC BLOOD PRESSURE: 131 MMHG | DIASTOLIC BLOOD PRESSURE: 83 MMHG

## 2025-07-15 PROCEDURE — 36415 COLL VENOUS BLD VENIPUNCTURE: CPT

## 2025-07-15 PROCEDURE — 94618 PULMONARY STRESS TESTING: CPT

## 2025-07-15 PROCEDURE — 99213 OFFICE O/P EST LOW 20 MIN: CPT | Mod: 25

## 2025-07-16 ENCOUNTER — NON-APPOINTMENT (OUTPATIENT)
Age: 80
End: 2025-07-16

## 2025-07-17 ENCOUNTER — APPOINTMENT (OUTPATIENT)
Dept: PULMONOLOGY | Facility: CLINIC | Age: 80
End: 2025-07-17
Payer: MEDICARE

## 2025-07-17 PROCEDURE — 99213 OFFICE O/P EST LOW 20 MIN: CPT | Mod: 93

## 2025-07-24 ENCOUNTER — APPOINTMENT (OUTPATIENT)
Dept: PULMONOLOGY | Facility: CLINIC | Age: 80
End: 2025-07-24

## 2025-07-31 ENCOUNTER — APPOINTMENT (OUTPATIENT)
Dept: PULMONOLOGY | Facility: CLINIC | Age: 80
End: 2025-07-31
Payer: MEDICARE

## 2025-07-31 PROCEDURE — ZZZZZ: CPT

## 2025-08-06 ENCOUNTER — APPOINTMENT (OUTPATIENT)
Dept: PULMONOLOGY | Facility: CLINIC | Age: 80
End: 2025-08-06
Payer: MEDICARE

## 2025-08-06 VITALS
SYSTOLIC BLOOD PRESSURE: 119 MMHG | HEART RATE: 84 BPM | RESPIRATION RATE: 14 BRPM | OXYGEN SATURATION: 95 % | DIASTOLIC BLOOD PRESSURE: 81 MMHG | BODY MASS INDEX: 49.48 KG/M2 | TEMPERATURE: 97.6 F | HEIGHT: 60 IN | WEIGHT: 252 LBS

## 2025-08-06 PROCEDURE — 99214 OFFICE O/P EST MOD 30 MIN: CPT

## 2025-08-06 PROCEDURE — G2211 COMPLEX E/M VISIT ADD ON: CPT

## 2025-08-20 ENCOUNTER — APPOINTMENT (OUTPATIENT)
Dept: ORTHOPEDIC SURGERY | Facility: CLINIC | Age: 80
End: 2025-08-20
Payer: MEDICARE

## 2025-08-20 DIAGNOSIS — M17.0 BILATERAL PRIMARY OSTEOARTHRITIS OF KNEE: ICD-10-CM

## 2025-08-20 DIAGNOSIS — Z96.651 PRESENCE OF RIGHT ARTIFICIAL KNEE JOINT: ICD-10-CM

## 2025-08-20 PROCEDURE — 99213 OFFICE O/P EST LOW 20 MIN: CPT

## 2025-08-20 PROCEDURE — G2211 COMPLEX E/M VISIT ADD ON: CPT

## 2025-08-20 PROCEDURE — 73562 X-RAY EXAM OF KNEE 3: CPT | Mod: 50

## 2025-08-26 ENCOUNTER — APPOINTMENT (OUTPATIENT)
Dept: PULMONOLOGY | Facility: CLINIC | Age: 80
End: 2025-08-26

## 2025-09-03 ENCOUNTER — APPOINTMENT (OUTPATIENT)
Dept: PULMONOLOGY | Facility: CLINIC | Age: 80
End: 2025-09-03
Payer: MEDICARE

## 2025-09-03 DIAGNOSIS — G47.33 OBSTRUCTIVE SLEEP APNEA (ADULT) (PEDIATRIC): ICD-10-CM

## 2025-09-03 DIAGNOSIS — I27.20 PULMONARY HYPERTENSION, UNSPECIFIED: ICD-10-CM

## 2025-09-03 PROCEDURE — 99213 OFFICE O/P EST LOW 20 MIN: CPT | Mod: 2W

## 2025-09-03 PROCEDURE — G2211 COMPLEX E/M VISIT ADD ON: CPT | Mod: 2W
